# Patient Record
Sex: FEMALE | Race: WHITE | NOT HISPANIC OR LATINO | Employment: OTHER | URBAN - METROPOLITAN AREA
[De-identification: names, ages, dates, MRNs, and addresses within clinical notes are randomized per-mention and may not be internally consistent; named-entity substitution may affect disease eponyms.]

---

## 2018-03-30 ENCOUNTER — CONVERSION ENCOUNTER (OUTPATIENT)
Dept: GENERAL RADIOLOGY | Facility: HOSPITAL | Age: 58
End: 2018-03-30

## 2018-09-18 ENCOUNTER — OFFICE VISIT CONVERTED (OUTPATIENT)
Dept: PULMONOLOGY | Facility: CLINIC | Age: 58
End: 2018-09-18
Attending: INTERNAL MEDICINE

## 2018-09-25 ENCOUNTER — OFFICE VISIT CONVERTED (OUTPATIENT)
Dept: PULMONOLOGY | Facility: CLINIC | Age: 58
End: 2018-09-25
Attending: INTERNAL MEDICINE

## 2018-10-09 ENCOUNTER — OFFICE VISIT CONVERTED (OUTPATIENT)
Dept: PULMONOLOGY | Facility: CLINIC | Age: 58
End: 2018-10-09
Attending: INTERNAL MEDICINE

## 2018-10-19 ENCOUNTER — OFFICE VISIT CONVERTED (OUTPATIENT)
Dept: PULMONOLOGY | Facility: CLINIC | Age: 58
End: 2018-10-19
Attending: INTERNAL MEDICINE

## 2019-02-07 ENCOUNTER — HOSPITAL ENCOUNTER (OUTPATIENT)
Dept: PHYSICAL THERAPY | Facility: CLINIC | Age: 59
Discharge: HOME | End: 2019-02-07
Attending: EMERGENCY MEDICINE

## 2019-04-10 ENCOUNTER — HOSPITAL ENCOUNTER (OUTPATIENT)
Dept: CT IMAGING | Facility: HOSPITAL | Age: 59
Discharge: HOME | End: 2019-04-10
Attending: INTERNAL MEDICINE

## 2019-04-12 ENCOUNTER — HOSPITAL ENCOUNTER (OUTPATIENT)
Dept: INFUSION THERAPY | Facility: HOSPITAL | Age: 59
Discharge: HOME | End: 2019-04-12
Attending: INTERNAL MEDICINE

## 2019-04-12 LAB
ALBUMIN SERPL-MCNC: 4.4 G/DL (ref 3.5–5)
ALBUMIN/GLOB SERPL: 1.7 {RATIO} (ref 1.4–2.6)
AMYLASE FLD-CCNC: 15 U/L (ref 30–150)
APPEARANCE FLD: ABNORMAL
BASOPHILS NFR FLD: 2 %
COLOR AMN: ABNORMAL
CONV TOTAL PROTEIN: 7 G/DL (ref 6.3–8.2)
CRYSTALS FLD MICRO: ABNORMAL
GLOBULIN UR ELPH-MCNC: 2.6 G/DL (ref 2–3.5)
GLUCOSE FLD-MCNC: 85 MG/DL
LDH FLD-CCNC: 198 U/L
LDH SERPL-CCNC: 229 U/L (ref 120–240)
LYMPHOCYTES NFR FLD MANUAL: 82 %
MACROPHAGE FLUID: 0 /100{WBCS}
MONOCYTES NFR FLD: 8 %
NEUTROPHILS NFR FLD MANUAL: 8 %
PH FLD: 8 [PH]
PROT FLD-MCNC: 4.2 G/DL (ref 6.3–8.2)
RBC # FLD AUTO: 5000 /UL
SPECIMEN SOURCE: ABNORMAL
WBC # FLD AUTO: 800 /UL

## 2019-04-15 LAB — BACTERIA FLD CULT: NORMAL

## 2019-04-18 ENCOUNTER — OFFICE VISIT CONVERTED (OUTPATIENT)
Dept: PULMONOLOGY | Facility: CLINIC | Age: 59
End: 2019-04-18
Attending: INTERNAL MEDICINE

## 2019-04-19 ENCOUNTER — HOSPITAL ENCOUNTER (OUTPATIENT)
Dept: LAB | Facility: HOSPITAL | Age: 59
Discharge: HOME | End: 2019-04-19
Attending: INTERNAL MEDICINE

## 2019-04-22 LAB
CCP IGA+IGG SERPL IA-ACNC: 2 UNITS (ref 0–19)
CONV ANTI NUCLEAR ANTIBODY WITH REFLEX: NEGATIVE

## 2019-04-23 LAB
CONV QUANTIFERON TB GOLD: NEGATIVE
QUANTIFERON CRITERIA: NORMAL
QUANTIFERON MITOGEN VALUE: >10 IU/ML
QUANTIFERON NIL VALUE: 0.03 IU/ML
QUANTIFERON TB1 AG VALUE: 0.04 IU/ML
QUANTIFERON TB2 AG VALUE: 0.03 IU/ML

## 2019-04-24 LAB — CONV ANTI NEUTROPHILIC CYTOPLASMIC AB W/REFLEX: NORMAL

## 2019-04-29 ENCOUNTER — HOSPITAL ENCOUNTER (OUTPATIENT)
Dept: SLEEP MEDICINE | Facility: HOSPITAL | Age: 59
Discharge: HOME | End: 2019-04-29
Attending: INTERNAL MEDICINE

## 2019-05-16 ENCOUNTER — OFFICE VISIT CONVERTED (OUTPATIENT)
Dept: PULMONOLOGY | Facility: CLINIC | Age: 59
End: 2019-05-16
Attending: INTERNAL MEDICINE

## 2019-06-05 ENCOUNTER — HOSPITAL ENCOUNTER (OUTPATIENT)
Dept: SLEEP MEDICINE | Facility: HOSPITAL | Age: 59
Discharge: HOME | End: 2019-06-05
Attending: INTERNAL MEDICINE

## 2019-06-12 ENCOUNTER — OUTSIDE FACILITY SERVICE (OUTPATIENT)
Dept: SLEEP MEDICINE | Facility: HOSPITAL | Age: 59
End: 2019-06-12

## 2019-06-12 PROCEDURE — 95810 POLYSOM 6/> YRS 4/> PARAM: CPT | Performed by: INTERNAL MEDICINE

## 2019-06-21 ENCOUNTER — HOSPITAL ENCOUNTER (OUTPATIENT)
Dept: GENERAL RADIOLOGY | Facility: HOSPITAL | Age: 59
Discharge: HOME | End: 2019-06-21
Attending: INTERNAL MEDICINE

## 2019-06-26 ENCOUNTER — HOSPITAL ENCOUNTER (OUTPATIENT)
Dept: INFUSION THERAPY | Facility: HOSPITAL | Age: 59
Discharge: HOME | End: 2019-06-26
Attending: INTERNAL MEDICINE

## 2019-06-26 LAB
ALBUMIN SERPL-MCNC: 4.5 G/DL (ref 3.5–5)
ALBUMIN/GLOB SERPL: 1.6 {RATIO} (ref 1.4–2.6)
AMYLASE FLD-CCNC: 13 U/L (ref 30–150)
APPEARANCE FLD: ABNORMAL
COLOR AMN: ABNORMAL
CONV TOTAL PROTEIN: 7.3 G/DL (ref 6.3–8.2)
CRYSTALS FLD MICRO: ABNORMAL
EOSINOPHIL NFR FLD MANUAL: 5 %
GLOBULIN UR ELPH-MCNC: 2.8 G/DL (ref 2–3.5)
GLUCOSE FLD-MCNC: 107 MG/DL
LDH FLD-CCNC: 236 U/L
LDH SERPL-CCNC: 227 U/L (ref 120–240)
LYMPHOCYTES NFR FLD MANUAL: 81 %
MACROPHAGE FLUID: 11 /100{WBCS}
MONOCYTES NFR FLD: 13 %
NEUTROPHILS NFR FLD MANUAL: 1 %
PH FLD: 8 [PH]
PROT FLD-MCNC: 4.1 G/DL (ref 6.3–8.2)
RBC # FLD AUTO: ABNORMAL /UL
SPECIMEN SOURCE: ABNORMAL
WBC # FLD AUTO: 100 /UL

## 2019-06-29 LAB — BACTERIA FLD CULT: NORMAL

## 2019-07-01 ENCOUNTER — OFFICE VISIT CONVERTED (OUTPATIENT)
Dept: NEUROLOGY | Facility: CLINIC | Age: 59
End: 2019-07-01
Attending: PSYCHIATRY & NEUROLOGY

## 2019-07-02 ENCOUNTER — HOSPITAL ENCOUNTER (OUTPATIENT)
Dept: LAB | Facility: HOSPITAL | Age: 59
Discharge: HOME | End: 2019-07-02

## 2019-07-02 ENCOUNTER — HOSPITAL ENCOUNTER (OUTPATIENT)
Dept: CT IMAGING | Facility: HOSPITAL | Age: 59
Discharge: HOME | End: 2019-07-02

## 2019-07-02 LAB
ALBUMIN SERPL-MCNC: 3.9 G/DL (ref 3.5–5)
ALBUMIN/GLOB SERPL: 1.6 {RATIO} (ref 1.4–2.6)
ALP SERPL-CCNC: 92 U/L (ref 53–141)
ALT SERPL-CCNC: 13 U/L (ref 10–40)
ANION GAP SERPL CALC-SCNC: 15 MMOL/L (ref 8–19)
AST SERPL-CCNC: 13 U/L (ref 15–50)
BASOPHILS # BLD AUTO: 0.03 10*3/UL (ref 0–0.2)
BASOPHILS NFR BLD AUTO: 0.6 % (ref 0–3)
BILIRUB SERPL-MCNC: 0.45 MG/DL (ref 0.2–1.3)
BUN SERPL-MCNC: 8 MG/DL (ref 5–25)
BUN/CREAT SERPL: 14 {RATIO} (ref 6–20)
CALCIUM SERPL-MCNC: 9.3 MG/DL (ref 8.7–10.4)
CHLORIDE SERPL-SCNC: 107 MMOL/L (ref 99–111)
CHOLEST SERPL-MCNC: 202 MG/DL (ref 107–200)
CHOLEST/HDLC SERPL: 4.3 {RATIO} (ref 3–6)
CONV ABS IMM GRAN: 0.03 10*3/UL (ref 0–0.2)
CONV CO2: 26 MMOL/L (ref 22–32)
CONV IMMATURE GRAN: 0.6 % (ref 0–1.8)
CONV TOTAL PROTEIN: 6.4 G/DL (ref 6.3–8.2)
CREAT BLD-MCNC: 0.6 MG/DL (ref 0.6–1.4)
CREAT UR-MCNC: 0.59 MG/DL (ref 0.5–0.9)
DEPRECATED RDW RBC AUTO: 49.8 FL (ref 36.4–46.3)
EOSINOPHIL # BLD AUTO: 0.22 10*3/UL (ref 0–0.7)
EOSINOPHIL # BLD AUTO: 4.7 % (ref 0–7)
ERYTHROCYTE [DISTWIDTH] IN BLOOD BY AUTOMATED COUNT: 13.9 % (ref 11.7–14.4)
GFR SERPLBLD BASED ON 1.73 SQ M-ARVRAT: >60 ML/MIN/{1.73_M2}
GFR SERPLBLD BASED ON 1.73 SQ M-ARVRAT: >60 ML/MIN/{1.73_M2}
GLOBULIN UR ELPH-MCNC: 2.5 G/DL (ref 2–3.5)
GLUCOSE SERPL-MCNC: 88 MG/DL (ref 65–99)
HBA1C MFR BLD: 12.7 G/DL (ref 12–16)
HCT VFR BLD AUTO: 40.9 % (ref 37–47)
HDLC SERPL-MCNC: 47 MG/DL (ref 40–60)
LDLC SERPL CALC-MCNC: 125 MG/DL (ref 70–100)
LYMPHOCYTES # BLD AUTO: 1.46 10*3/UL (ref 1–5)
MCH RBC QN AUTO: 30.2 PG (ref 27–31)
MCHC RBC AUTO-ENTMCNC: 31.1 G/DL (ref 33–37)
MCV RBC AUTO: 97.1 FL (ref 81–99)
MONOCYTES # BLD AUTO: 0.41 10*3/UL (ref 0.2–1.2)
MONOCYTES NFR BLD AUTO: 8.8 % (ref 3–10)
NEUTROPHILS # BLD AUTO: 2.5 10*3/UL (ref 2–8)
NEUTROPHILS NFR BLD AUTO: 53.9 % (ref 30–85)
NRBC CBCN: 0 % (ref 0–0.7)
OSMOLALITY SERPL CALC.SUM OF ELEC: 296 MOSM/KG (ref 273–304)
PLATELET # BLD AUTO: 186 10*3/UL (ref 130–400)
PMV BLD AUTO: 11.8 FL (ref 9.4–12.3)
POTASSIUM SERPL-SCNC: 4.1 MMOL/L (ref 3.5–5.3)
RBC # BLD AUTO: 4.21 10*6/UL (ref 4.2–5.4)
SODIUM SERPL-SCNC: 144 MMOL/L (ref 135–147)
TRIGL SERPL-MCNC: 151 MG/DL (ref 40–150)
VARIANT LYMPHS NFR BLD MANUAL: 31.4 % (ref 20–45)
VLDLC SERPL-MCNC: 30 MG/DL (ref 5–37)
WBC # BLD AUTO: 4.65 10*3/UL (ref 4.8–10.8)

## 2019-07-12 ENCOUNTER — HOSPITAL ENCOUNTER (OUTPATIENT)
Dept: SLEEP MEDICINE | Facility: HOSPITAL | Age: 59
Discharge: HOME | End: 2019-07-12
Attending: INTERNAL MEDICINE

## 2019-07-15 ENCOUNTER — OUTSIDE FACILITY SERVICE (OUTPATIENT)
Dept: SLEEP MEDICINE | Facility: HOSPITAL | Age: 59
End: 2019-07-15

## 2019-07-17 PROCEDURE — 95811 POLYSOM 6/>YRS CPAP 4/> PARM: CPT | Performed by: INTERNAL MEDICINE

## 2019-08-08 ENCOUNTER — OFFICE VISIT CONVERTED (OUTPATIENT)
Dept: PULMONOLOGY | Facility: CLINIC | Age: 59
End: 2019-08-08
Attending: INTERNAL MEDICINE

## 2019-09-06 ENCOUNTER — HOSPITAL ENCOUNTER (OUTPATIENT)
Dept: GENERAL RADIOLOGY | Facility: HOSPITAL | Age: 59
Discharge: HOME | End: 2019-09-06
Attending: INTERNAL MEDICINE

## 2019-09-07 ENCOUNTER — HOSPITAL ENCOUNTER (OUTPATIENT)
Dept: OTHER | Facility: HOSPITAL | Age: 59
Discharge: HOME | End: 2019-09-07
Attending: NURSE PRACTITIONER

## 2019-09-07 LAB
25(OH)D3 SERPL-MCNC: 21.9 NG/ML (ref 30–100)
ALBUMIN SERPL-MCNC: 4.1 G/DL (ref 3.5–5)
ALBUMIN/GLOB SERPL: 1.7 {RATIO} (ref 1.4–2.6)
ALP SERPL-CCNC: 99 U/L (ref 53–141)
ALT SERPL-CCNC: 13 U/L (ref 10–40)
ANION GAP SERPL CALC-SCNC: 16 MMOL/L (ref 8–19)
AST SERPL-CCNC: 16 U/L (ref 15–50)
BILIRUB SERPL-MCNC: 0.49 MG/DL (ref 0.2–1.3)
BUN SERPL-MCNC: 11 MG/DL (ref 5–25)
BUN/CREAT SERPL: 18 {RATIO} (ref 6–20)
CALCIUM SERPL-MCNC: 9.2 MG/DL (ref 8.7–10.4)
CHLORIDE SERPL-SCNC: 107 MMOL/L (ref 99–111)
CHOLEST SERPL-MCNC: 155 MG/DL (ref 107–200)
CHOLEST/HDLC SERPL: 3.2 {RATIO} (ref 3–6)
CONV CO2: 24 MMOL/L (ref 22–32)
CONV TOTAL PROTEIN: 6.5 G/DL (ref 6.3–8.2)
CREAT UR-MCNC: 0.61 MG/DL (ref 0.5–0.9)
GFR SERPLBLD BASED ON 1.73 SQ M-ARVRAT: >60 ML/MIN/{1.73_M2}
GLOBULIN UR ELPH-MCNC: 2.4 G/DL (ref 2–3.5)
GLUCOSE SERPL-MCNC: 96 MG/DL (ref 65–99)
HDLC SERPL-MCNC: 48 MG/DL (ref 40–60)
LDLC SERPL CALC-MCNC: 94 MG/DL (ref 70–100)
OSMOLALITY SERPL CALC.SUM OF ELEC: 295 MOSM/KG (ref 273–304)
POTASSIUM SERPL-SCNC: 4.3 MMOL/L (ref 3.5–5.3)
SODIUM SERPL-SCNC: 143 MMOL/L (ref 135–147)
TRIGL SERPL-MCNC: 64 MG/DL (ref 40–150)
VIT B12 SERPL-MCNC: 158 PG/ML (ref 211–911)
VLDLC SERPL-MCNC: 13 MG/DL (ref 5–37)

## 2019-09-11 ENCOUNTER — OUTSIDE FACILITY SERVICE (OUTPATIENT)
Dept: SLEEP MEDICINE | Facility: HOSPITAL | Age: 59
End: 2019-09-11

## 2019-09-12 ENCOUNTER — OFFICE VISIT CONVERTED (OUTPATIENT)
Dept: PULMONOLOGY | Facility: CLINIC | Age: 59
End: 2019-09-12
Attending: INTERNAL MEDICINE

## 2019-09-17 ENCOUNTER — OFFICE VISIT CONVERTED (OUTPATIENT)
Dept: PULMONOLOGY | Facility: CLINIC | Age: 59
End: 2019-09-17
Attending: THORACIC SURGERY (CARDIOTHORACIC VASCULAR SURGERY)

## 2019-09-17 ENCOUNTER — HOSPITAL ENCOUNTER (OUTPATIENT)
Dept: ONCOLOGY | Facility: HOSPITAL | Age: 59
Discharge: HOME | End: 2019-09-17
Attending: THORACIC SURGERY (CARDIOTHORACIC VASCULAR SURGERY)

## 2019-09-25 ENCOUNTER — HOSPITAL ENCOUNTER (OUTPATIENT)
Dept: SLEEP MEDICINE | Facility: HOSPITAL | Age: 59
Discharge: HOME | End: 2019-09-25
Attending: INTERNAL MEDICINE

## 2019-09-25 ENCOUNTER — OUTSIDE FACILITY SERVICE (OUTPATIENT)
Dept: SLEEP MEDICINE | Facility: HOSPITAL | Age: 59
End: 2019-09-25

## 2019-09-25 PROCEDURE — 99214 OFFICE O/P EST MOD 30 MIN: CPT | Performed by: INTERNAL MEDICINE

## 2019-10-01 ENCOUNTER — HOSPITAL ENCOUNTER (OUTPATIENT)
Dept: CARDIOLOGY | Facility: HOSPITAL | Age: 59
Discharge: HOME | End: 2019-10-01
Attending: INTERNAL MEDICINE

## 2019-10-23 ENCOUNTER — HOSPITAL ENCOUNTER (OUTPATIENT)
Dept: GENERAL RADIOLOGY | Facility: HOSPITAL | Age: 59
Discharge: HOME | End: 2019-10-23
Attending: NURSE PRACTITIONER

## 2020-01-21 ENCOUNTER — OFFICE VISIT CONVERTED (OUTPATIENT)
Dept: PULMONOLOGY | Facility: CLINIC | Age: 60
End: 2020-01-21
Attending: INTERNAL MEDICINE

## 2020-03-05 ENCOUNTER — HOSPITAL ENCOUNTER (OUTPATIENT)
Dept: GENERAL RADIOLOGY | Facility: HOSPITAL | Age: 60
Discharge: HOME | End: 2020-03-05
Attending: NURSE PRACTITIONER

## 2020-03-18 ENCOUNTER — HOSPITAL ENCOUNTER (OUTPATIENT)
Dept: OTHER | Facility: HOSPITAL | Age: 60
Discharge: HOME | End: 2020-03-18
Attending: EMERGENCY MEDICINE

## 2020-07-13 ENCOUNTER — OFFICE VISIT CONVERTED (OUTPATIENT)
Dept: SURGERY | Facility: CLINIC | Age: 60
End: 2020-07-13
Attending: SURGERY

## 2020-08-27 ENCOUNTER — HOSPITAL ENCOUNTER (OUTPATIENT)
Dept: PREADMISSION TESTING | Facility: HOSPITAL | Age: 60
Discharge: HOME | End: 2020-08-27
Attending: SURGERY

## 2020-08-28 LAB — SARS-COV-2 RNA SPEC QL NAA+PROBE: NOT DETECTED

## 2020-08-31 ENCOUNTER — HOSPITAL ENCOUNTER (OUTPATIENT)
Dept: GASTROENTEROLOGY | Facility: HOSPITAL | Age: 60
Setting detail: HOSPITAL OUTPATIENT SURGERY
Discharge: HOME | End: 2020-08-31
Attending: SURGERY

## 2020-09-21 ENCOUNTER — HOSPITAL ENCOUNTER (OUTPATIENT)
Dept: GENERAL RADIOLOGY | Facility: HOSPITAL | Age: 60
Discharge: HOME | End: 2020-09-21
Attending: INTERNAL MEDICINE

## 2020-09-24 ENCOUNTER — OFFICE VISIT CONVERTED (OUTPATIENT)
Dept: SURGERY | Facility: CLINIC | Age: 60
End: 2020-09-24
Attending: SURGERY

## 2020-10-26 ENCOUNTER — HOSPITAL ENCOUNTER (OUTPATIENT)
Dept: GENERAL RADIOLOGY | Facility: HOSPITAL | Age: 60
Discharge: HOME | End: 2020-10-26
Attending: NURSE PRACTITIONER

## 2020-11-04 ENCOUNTER — OUTSIDE FACILITY SERVICE (OUTPATIENT)
Dept: SLEEP MEDICINE | Facility: HOSPITAL | Age: 60
End: 2020-11-04

## 2020-11-04 ENCOUNTER — HOSPITAL ENCOUNTER (OUTPATIENT)
Dept: SLEEP MEDICINE | Facility: HOSPITAL | Age: 60
Discharge: HOME | End: 2020-11-04
Attending: INTERNAL MEDICINE

## 2020-11-04 PROCEDURE — 99213 OFFICE O/P EST LOW 20 MIN: CPT | Performed by: INTERNAL MEDICINE

## 2021-05-10 NOTE — H&P
History and Physical      Patient Name: Chiara Zee   Patient ID: [id number]   Sex: Female   YOB: 1960    Primary Care Provider: Jaleesa CERDA   Referring Provider: Jaleesa CERDA    Visit Date: July 13, 2020    Provider: Bear Nelson MD   Location[facility]   Location Address: [address] [place] [address]  [id number]   Location Phone: [phone]          Chief Complaint  · Outpatient History & Physical / Surgical Orders  · Colon Consult      History Of Present Illness     Ms. Zee is a 60-year-old female who presents with a history of colonic polyps. She denies rectal bleeding or other symptoms.       Past Medical History  Allergic rhinitis, chronic; Chronic Obstructive Pulmonary Disease; COPD (chronic obstructive pulmonary disease); Heart attack; High blood pressure; High cholesterol; Hyperlipidemia; Hypertension, Benign Essential; Limb Pain; Limb Swelling; Muscle cramps; Rectal bleeding; Shortness Of Air         Past Surgical History  Caesarean section         Medication List  Allergy 25 mg oral tablet; atorvastatin 40 mg oral tablet; buspirone 5 mg oral tablet; loratadine 10 mg oral tablet; metoprolol succinate 25 mg oral tablet extended release 24 hr; montelukast 10 mg oral tablet; ranitidine HCl 150 mg oral capsule; sertraline 25 mg oral tablet; Vitamin D3 2,000 unit oral capsule; Xarelto 2.5 mg oral tablet         Allergy List  NO KNOWN DRUG ALLERGIES         Family Medical History  Diabetes, unspecified type; Prostate cancer; Family history of cancer; Family history of diabetes mellitus         Social History  Alcohol (Light); Denies substance abuse (Never); ; Former smoker; Tobacco (Former)         Review of Systems  · Cardiovascular  o Denies  o : chest pain on exertion, shortness of breath, lower extremity swelling  · Respiratory  o Denies  o : wheezing, chronic cough, coughing up blood  · Gastrointestinal  o Denies  o : diarrhea, chronic  "abdominal pain, reflux symptoms      Vitals  Date Time BP Position Site L\R Cuff Size HR RR TEMP (F) WT  HT  BMI kg/m2 BSA m2 O2 Sat        07/13/2020 03:30 PM       16  258lbs 8oz 5'  4\" 44.37 2.3           Physical Examination  · Constitutional  o Appearance  o : reveals patient to be in no acute distress  · Head and Face  o HEENT  o : shows sclera to be nonicteric  · Respiratory  o Respiratory  o : chest is clear  · Cardiovascular  o Heart  o : regular rate and rhythm without murmurs, gallops or rubs  · Gastrointestinal  o Abdominal Examination  o :   § Abdomen  § : abdomen is soft and nontender, bowel sounds are present, no masses, gaurding or rebound were noted   · Musculoskeletal  o Extremeties/Joint  o : extremities show a ful range of motion  · Neurologic  o Neurologic/Reflexes  o : intact          Assessment  · Screening for colon cancer     V76.51/Z12.11  · Pre-op testing     V72.84/Z01.818  · History of colon polyps     V12.72/Z86.010    Problems Reconciled  Plan  · Orders  o Colonoscopy (27995) - V76.51/Z12.11, V12.72/Z86.010 - 08/31/2020  o [facility] Pre-Op Covid-19 Screening (78591) - V72.84/Z01.818 - 08/27/2020   [address]  -- 08/27 @ 4:15P  · Medications  o Medications have been Reconciled  o Transition of Care or Provider Policy  · Instructions  o PLAN:  o Handouts Provided-Pre-Procedure Instructions including date and time and location of procedure.  o Surgical Facility: [facility]  o ****Surgical Orders****  o RISK AND BENEFITS:  o Consent for surgery: Given these options, the patient has verbally expressed an understanding of the risks of surgery and finds these risks acceptable. We will proceed with surgery as soon as possible.  o Consult Anesthesia for any post operative block, or any pain management procedure deemed necessary by the anesthesiologist for adequate post-operative pain control.  o O.R. PREP: Per protocol  o IV: LR@ 75ml/hr  o PLEASE SIGN PERMIT FOR: COLONOSCOPY " WITH POSSIBLE BIOPSIES  o The above History and Physical Examination has been completed within 30 days of admission.  o ****Patient Status****  o Outpatient  o Electronically Identified Patient Education Materials Provided Electronically            Electronically Signed by: Lisha Sidhu-, -Author on July 22, 2020 09:31:32 AM  Electronically Co-signed by: Bear Nelson MD -Reviewer on July 23, 2020 12:47:11 PM

## 2021-05-13 NOTE — PROGRESS NOTES
Progress Note      Patient Name: Chiara Zee   Patient ID: [id number]   Sex: Female   YOB: 1960    Primary Care Provider: Jaleesa CERDA   Referring Provider: Jaleesa CERDA    Visit Date: September 24, 2020    Provider: Bear Nelson MD   Location: [facility]   Location Address: [address] [place] [address]  [id number]   Location Phone: [phone]          Chief Complaint  · Follow Up Surgery      History Of Present Illness     Ms. Zee is seen in follow-up status post colonoscopy. She was found to have diverticulosis.       Past Medical History  Allergic rhinitis, chronic; Chronic Obstructive Pulmonary Disease; COPD (chronic obstructive pulmonary disease); Heart attack; High blood pressure; High cholesterol; Hyperlipidemia; Hypertension, Benign Essential; Limb Pain; Limb Swelling; Muscle cramps; Rectal bleeding; Shortness Of Air         Past Surgical History  Caesarean section; Colonoscopy         Medication List  Allergy 25 mg oral tablet; atorvastatin 40 mg oral tablet; buspirone 5 mg oral tablet; Fiber Gummies 2 gram oral tablet,chewable; loratadine 10 mg oral tablet; metoprolol succinate 25 mg oral tablet extended release 24 hr; montelukast 10 mg oral tablet; ranitidine HCl 150 mg oral capsule; sertraline 25 mg oral tablet; Vitamin D3 2,000 unit oral capsule; Xarelto 2.5 mg oral tablet         Allergy List  NO KNOWN DRUG ALLERGIES         Family Medical History  Diabetes, unspecified type; Prostate cancer; Family history of cancer; Family history of diabetes mellitus         Social History  Alcohol (Light); Denies substance abuse (Never); ; Former smoker; Tobacco (Former)         Review of Systems  · Cardiovascular  o Denies  o : chest pain on exertion, shortness of breath, lower extremity swelling  · Respiratory  o Denies  o : wheezing, chronic cough, coughing up blood  · Gastrointestinal  o Denies  o : diarrhea, chronic abdominal pain,  "reflux symptoms      Vitals  Date Time BP Position Site L\R Cuff Size HR RR TEMP (F) WT  HT  BMI kg/m2 BSA m2 O2 Sat FR L/min FiO2 HC       09/24/2020 03:23 PM       14  250lbs 2oz 5'  4\" 42.93 2.26                 Assessment  · Encounter for examination following surgery     V67.00/Z09      Plan     She was instructed as to a high fiber. I have recommended a follow-up colonoscopy in five years.             Electronically Signed by: Lisha Sidhu-, -Author on October 4, 2020 11:32:28 AM  Electronically Co-signed by: Bear Nelson MD -Reviewer on October 5, 2020 12:58:10 PM  "

## 2021-05-14 ENCOUNTER — OFFICE VISIT CONVERTED (OUTPATIENT)
Dept: PULMONOLOGY | Facility: CLINIC | Age: 61
End: 2021-05-14
Attending: NURSE PRACTITIONER

## 2021-05-14 VITALS — BODY MASS INDEX: 42.7 KG/M2 | RESPIRATION RATE: 14 BRPM | HEIGHT: 64 IN | WEIGHT: 250.12 LBS

## 2021-05-15 VITALS
BODY MASS INDEX: 41.32 KG/M2 | WEIGHT: 242 LBS | HEIGHT: 64 IN | HEART RATE: 64 BPM | SYSTOLIC BLOOD PRESSURE: 137 MMHG | DIASTOLIC BLOOD PRESSURE: 52 MMHG

## 2021-05-15 VITALS — RESPIRATION RATE: 16 BRPM | BODY MASS INDEX: 44.13 KG/M2 | HEIGHT: 64 IN | WEIGHT: 258.5 LBS

## 2021-05-23 ENCOUNTER — TRANSCRIBE ORDERS (OUTPATIENT)
Dept: ADMINISTRATIVE | Facility: HOSPITAL | Age: 61
End: 2021-05-23

## 2021-05-23 DIAGNOSIS — Z12.31 VISIT FOR SCREENING MAMMOGRAM: Primary | ICD-10-CM

## 2021-05-25 ENCOUNTER — TRANSCRIBE ORDERS (OUTPATIENT)
Dept: ADMINISTRATIVE | Facility: HOSPITAL | Age: 61
End: 2021-05-25

## 2021-05-25 DIAGNOSIS — N64.4 BREAST PAIN, RIGHT: Primary | ICD-10-CM

## 2021-05-28 VITALS
SYSTOLIC BLOOD PRESSURE: 113 MMHG | HEIGHT: 64 IN | BODY MASS INDEX: 40.47 KG/M2 | WEIGHT: 237.06 LBS | DIASTOLIC BLOOD PRESSURE: 56 MMHG | BODY MASS INDEX: 43.8 KG/M2 | BODY MASS INDEX: 41.23 KG/M2 | BODY MASS INDEX: 39.35 KG/M2 | DIASTOLIC BLOOD PRESSURE: 55 MMHG | SYSTOLIC BLOOD PRESSURE: 113 MMHG | DIASTOLIC BLOOD PRESSURE: 55 MMHG | HEART RATE: 82 BPM | SYSTOLIC BLOOD PRESSURE: 104 MMHG | WEIGHT: 236.19 LBS | TEMPERATURE: 98.3 F | OXYGEN SATURATION: 99 % | OXYGEN SATURATION: 96 % | HEIGHT: 64 IN | RESPIRATION RATE: 16 BRPM | HEART RATE: 66 BPM | WEIGHT: 246.19 LBS | HEIGHT: 64 IN | RESPIRATION RATE: 12 BRPM | HEIGHT: 63 IN | WEIGHT: 247.19 LBS | BODY MASS INDEX: 42.76 KG/M2 | OXYGEN SATURATION: 97 % | OXYGEN SATURATION: 96 % | DIASTOLIC BLOOD PRESSURE: 44 MMHG | HEIGHT: 65 IN | TEMPERATURE: 97.6 F | HEART RATE: 91 BPM | RESPIRATION RATE: 12 BRPM | HEART RATE: 75 BPM | TEMPERATURE: 99.1 F | HEART RATE: 67 BPM | WEIGHT: 250.5 LBS | RESPIRATION RATE: 16 BRPM | BODY MASS INDEX: 42.03 KG/M2 | HEIGHT: 65 IN | TEMPERATURE: 98 F | RESPIRATION RATE: 12 BRPM | SYSTOLIC BLOOD PRESSURE: 107 MMHG | HEART RATE: 78 BPM | RESPIRATION RATE: 12 BRPM | OXYGEN SATURATION: 98 % | TEMPERATURE: 98 F | TEMPERATURE: 98.2 F | SYSTOLIC BLOOD PRESSURE: 126 MMHG | WEIGHT: 247.5 LBS | SYSTOLIC BLOOD PRESSURE: 122 MMHG | DIASTOLIC BLOOD PRESSURE: 43 MMHG | DIASTOLIC BLOOD PRESSURE: 40 MMHG | OXYGEN SATURATION: 99 %

## 2021-05-28 VITALS
SYSTOLIC BLOOD PRESSURE: 157 MMHG | WEIGHT: 251.12 LBS | BODY MASS INDEX: 41.91 KG/M2 | HEIGHT: 65 IN | RESPIRATION RATE: 16 BRPM | DIASTOLIC BLOOD PRESSURE: 66 MMHG | WEIGHT: 251.56 LBS | RESPIRATION RATE: 16 BRPM | TEMPERATURE: 98.3 F | OXYGEN SATURATION: 96 % | HEIGHT: 63 IN | BODY MASS INDEX: 44.5 KG/M2 | DIASTOLIC BLOOD PRESSURE: 63 MMHG | OXYGEN SATURATION: 96 % | SYSTOLIC BLOOD PRESSURE: 132 MMHG | HEART RATE: 79 BPM | HEART RATE: 84 BPM | TEMPERATURE: 97.7 F

## 2021-05-28 VITALS
DIASTOLIC BLOOD PRESSURE: 79 MMHG | OXYGEN SATURATION: 97 % | HEIGHT: 64 IN | WEIGHT: 236.55 LBS | TEMPERATURE: 97.4 F | BODY MASS INDEX: 40.39 KG/M2 | HEART RATE: 74 BPM | RESPIRATION RATE: 16 BRPM | SYSTOLIC BLOOD PRESSURE: 128 MMHG

## 2021-05-28 VITALS
RESPIRATION RATE: 17 BRPM | HEART RATE: 76 BPM | OXYGEN SATURATION: 96 % | SYSTOLIC BLOOD PRESSURE: 156 MMHG | TEMPERATURE: 98.3 F | DIASTOLIC BLOOD PRESSURE: 82 MMHG | WEIGHT: 293 LBS | BODY MASS INDEX: 50.02 KG/M2 | HEIGHT: 64 IN

## 2021-05-28 VITALS
DIASTOLIC BLOOD PRESSURE: 60 MMHG | TEMPERATURE: 98 F | HEART RATE: 71 BPM | RESPIRATION RATE: 18 BRPM | WEIGHT: 251.19 LBS | OXYGEN SATURATION: 97 % | SYSTOLIC BLOOD PRESSURE: 136 MMHG | HEIGHT: 64 IN | BODY MASS INDEX: 42.88 KG/M2
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
## 2021-05-28 NOTE — PROGRESS NOTES
Patient: OLIVA MCELROY     Acct: [id number]     Report: #[id number]  UNIT #: [id number]     : 1960    Encounter Date:2021  PRIMARY CARE: PRAVEENA CHEW  ***Signed***  --------------------------------------------------------------------------------------------------------------------  Chief Complaint      Encounter Date      May 14, 2021            Primary Care Provider      PRAVEENA CHEW            Patient Complaint      Patient is complaining of      Pt here for abnormal chest xray. COPD.            VITALS      Height 5 ft 4 in / 162.56 cm      Weight 298 lbs  / 135.777134 kg      BSA 2.32 m2      BMI 51.2 kg/m2      Temperature 98.3 F / 36.83 C - Temporal      Pulse 76      Respirations 17      Blood Pressure 156/82 Sitting, Left Arm      Pulse Oximetry 96%, room air            HPI      The patient is a 60 year old female patient of Dr. Dawson's here for surgical     clearance today.             The patient states she was short of breath last month and went to see her     primary care provider who ordered a chest x-ray. Chest x-ray showed some pleural    effusion that is chronic as well as mild diffuse interstitial markings and     bibasilar atelectasis. The patient states since her visit with her primary care     provider her shortness of breath is at baseline however she states she has not     been taking her Stiolto for some time and has only been using albuterol inhaler     twice a week. She is able to walk on flat ground for a while but is not able to     climb a flight of stairs without getting short of breath. She denies any fever     or chills, chest pain, hemoptysis,  purulent sputum production, nausea or     vomiting or diarrhea or swollen glands in head and neck. She is a former     cigarettes smoker and she is up to date on her vaccines. The patient states she     broke her wrist on 21 from a fall and needs clearance to get surgery.             I reviewed the Review of  Systems, medical, surgical and family history and agree    with those as entered.            ROS      Constitutional:  Complains of: Fatigue; Denies: Fever, Weight gain, Weight loss,    Chills, Insomnia, Other      Respiratory/Breathing:  Complains of: Shortness of air; Denies: Wheezing, Cough,    Hemoptysis, Pleuritic pain, Other      Endocrine:  Denies: Polydipsia, Polyuria, Heat/cold intolerance, Abnorml     menstrual pattern, Diabetes, Other      Eyes:  Denies: Blurred vision, Vision Changes, Other      Ears, nose, mouth, throat:  Denies: Congestion, Dysphagia, Hearing Changes, Nose    Bleeding, Nasal Discharge, Throat pain, Tinnitus, Other      Cardiovascular:  Denies: Chest Pain, Exertional dyspnea, Peripheral Edema,     Palpitations, Syncope, Wake up Gasping for air, Orthopnea, Tachycardia, Other      Gastrointestinal:  Denies: Abdominal pain/cramping, Bloody stools, Constipation,    Diarrhea, Melena, Nausea, Vomiting, Other      Genitourinary:  Denies: Dysuria, Urinary frequency, Incontinence, Hematuria,     Urgency, Other      Musculoskeletal:  Denies: Joint Pain, Joint Stiffness, Joint Swelling, Myalgias,    Other      Hematologic/lymphatic:  DENIES: Lymphadenopathy, Bruising, Bleeding tendencies,     Other      Neurologic:  Denies: Headache, Numbness, Weakness, Seizures, Other      Psychiatric:  Denies: Anxiety, Appropriate Effect, Depression, Other      Sleep:  No: Excessive daytime sleep, Morning Headache?, Snoring, Insomnia?, Stop    breathing at sleep?, Other      Integumentary:  Denies: Rash, Dry skin, Skin Warm to Touch, Other            FAMILY/SOCIAL/MEDICAL HX      Surgical History:  Yes: Bowel Surgery (COLONOSCOPY), Breast Surgery, Oral     Surgery (WISDOM TEETH, DENTAL); No: AAA Repair, Abdominal Surgery, Adenoids,     Angioplasty, Appendectomy, Back Surgery, Bladder Surgery, CABG, Carotid     Stenosis, Cholecystectomy, Ear Surgery, Eye Surgery, Head Surgery, Hernia     Surgery, Kidney Surgery,  Nose Surgery, Orthopedic Surgery, Prostatectomy, Rectal    Surgery, Spinal Surgery, Testicular Surgery, Throat Surgery, Tonsils, Valve     Replacement, Vascular Surgery, Other Surgeries      Diabetes - Family Hx:  Brother, Sister      Cancer/Type - Family Hx:  Mother, Father      Social History:  Tobacco Use      Smoking status:  Former smoker ((1 ppd, 35 years, quit ))       Section:  Yes      Tubal Ligation:  Yes      Hysterectomy:  No      Anticoagulation Therapy:  Yes      Medical History:  Yes: Arthritis (KNEES), Chronic Bronchitis/COPD (MILD),     Depression, Anxiety, Heart Attack (NOT FOR SURE WHEN-YEARS AGO),     Hemorrhoids/Rectal Prob (COLON POLYPS), High Blood Pressure (ON MEDS), Shortness    Of Breath (INHALER), Tuberculosis or Pos TB Te (TB exposure); No: Anemia,     Asthma, Blood Disease, Broken Bones, Cataracts, Chemical Dependency,     Chemotherapy/Cancer, Emphysema, Chronic Liver Disease, Colon Trouble, Colitis,     Diverticulitis, Congestive Heart Failu, Deafness or Ringing Ears, Bipolar     Disorder, PTSD, Diabetes, Epilepsy, Seizures, Glaucoma, Gall Stones, Gout, Head     Injury, Heart Murmur, GERD, Hepatitis, Hiatal Hernia, HIV (Do not ask - volu,     Kidney or Bladder Disease, Kidney Stones, Migrane Headaches, Mitral Valve     Prolapse, Psychiatric Care, Reflux Disease, Rheumatic Fever, Sexually     Transmitted Dis, Sinus Trouble, Skin Disease/Psoriais/Ecz, Stroke, Thyroid     Problem, Miscellaneous Medical/oth      Psychiatric History      anxiety and depression            PREVENTION      Hx Influenza Vaccination:  Yes      Date Influenza Vaccine Given:  Sep 1, 2020      Influenza Vaccine Declined:  No      2 or More Falls in Past Year?:  Yes      Fall Past Year with Injury?:  Yes      Hx Pneumococcal Vaccination:  Yes      Encouraged to follow-up with:  PCP regarding preventative exams.      Chart initiated by      Bambi Reed CMA            ALLERGIES/MEDICATIONS       Allergies:        Coded Allergies:             NO KNOWN ALLERGIES (Unverified , 5/14/21)      Medications    Last Reconciled on 5/14/21 14:00 by TORSTEN CHOW      Budesonide/Glycopyr/Formoterol (Breztri Aerosphere Inhaler) 10.7 Gm Hfa.aer.ad      2 PUFFS INH BID, #1 INH 11 Refills         Prov: TORSTEN CHOW APRN         5/14/21       MDI-Albuterol (Ventolin HFA) 8 Gm Hfa.aer.ad      2 PUFFS INH Q6H PRN for SHORTNESS OF BREATH, #1 MDI 11 Refills         Prov: TORSTEN CHOW APRN         5/14/21       Meloxicam (Meloxicam*) 15 Mg Tablet      15 MG PO QDAY, #30 TAB 0 Refills         Reported         8/28/20       Cetirizine Hcl (zyrTEC) 10 Mg Tablet      20 MG PO BID, #30 TAB 0 Refills         Reported         8/28/20       Rivaroxaban (Xarelto) 2.5 Mg Tablet      2.5 MG PO BID for 30 Days, #60 TAB         Reported         9/12/19       Montelukast Sodium (Singulair*) 10 Mg Tab      10 MG PO HS, #30 TAB 0 Refills         Reported         9/18/18       busPIRone HCl (busPIRone HCl) 5 Mg Tablet      5 MG PO QDAY, #60 TAB         Reported         9/18/18       Cholecalciferol (Vitamin D3) (Vitamin D3) 2,000 Unit Tablet      2000 UNITS PO BID, #30 TAB         Reported         9/18/18       Metoprolol Succinate (Metoprolol Succinate) 25 Mg Tab.er.24h      25 MG PO QDAY, #30 TAB 0 Refills         Reported         9/6/17       Atorvastatin Calcium (Lipitor*) 20 Mg Tablet      20 MG PO HS, #30 TAB 0 Refills         Reported         9/6/17      Current Medications      Current Medications Reviewed 5/14/21            EXAM      Vital Signs Reviewed      Gen: WDWN, Alert, NAD.        HEENT:  PERRL, EOMI.  OP, nares clear, no sinus tenderness.      Neck:  Supple, no JVD, no thyromegaly.      Lymph: No axillary, cervical, supraclavicular lymphadenopathy noted bilaterally.      Chest: Bilateral diminished bases, no wheezes, rales or rhonchi appreciated, no    rmal work of breathing noted. The patient is able to speak  full sentences.       CV:  RRR, no MGR, pulses 2+, equal.      Abd:  Soft, NT, ND, + BS, no HSM.      EXT:  No clubbing, no cyanosis, no edema, no joint tenderness.       Neuro:  A  Skin: No rashes or lesions.      Vitals      Vitals:             Height 5 ft 4 in / 162.56 cm           Weight 298 lbs  / 135.094180 kg           BSA 2.32 m2           BMI 51.2 kg/m2           Temperature 98.3 F / 36.83 C - Temporal           Pulse 76           Respirations 17           Blood Pressure 156/82 Sitting, Left Arm           Pulse Oximetry 96%, room air            REVIEW      Results Reviewed      PCCS Results Reviewed?:  Yes Prev Radiology Results, Yes Previous [facility] Records      Radiographic Results      I personally reviewed a chest x-ray from [facility] on 21 and a CT scan of     the chest from 2020.      PFT Results      Patient: CHIRAA MCELROY   Acct: #[id number]   Report: #[id number]            UNIT #: [id number]    DOS:       LOCATION:[facility]     : 1960            PROVIDERS      ADMITTING:     FAMILY:  PRAVEENA CHEW         OTHER:       DICTATING:  ARNULFO MCCABE DO            REASON FOR VISIT:  COUGH            *******Signed******                                    [facility]                          [facility]                            [place], [place]  [address]               __________________________________________________________________________             Patient Name:                   Attending Physician:      Chiara Mcelroy D.O.             Patient Visit # MR #            Admit Date  Disch Date     Location      [id number]    [id number]      10/01/2019                 [facility]- -             Date of Birth      1960      __________________________________________________________________________      0821 - DIAGNOSTIC REPORT             PULMONARY FUNCTION TEST             DATE OF SERVICE:       10/01/2019.             SPIROMETRY:      FEV1/FVC ratio is 78      FEV1 is 87% of predicted, 2.25 L.      FVC is 85% of predicted, 2.87 L.             LUNG VOLUMES:      Total lung capacity is 90% of predicted, 4.58 L.      Residual volume is 93% of predicted, 1.87 L.             DIFFUSION CAPACITY:      DLCO is 62% of predicted.             COMPARISON:      Compared to previous pulmonary function test performed in 2018:      FEV1 has declined 13%.      Total lung capacity has declined 19%.             CONCLUSION:      1. Normal spirometry without obstructive defect.      2. Normal lung volumes.      3. Mildly reduced diffusion capacity.            Correlate clinically.             To be electronically signed in [employer]      [id number] ARNULFO MCCABE D.O.             KM:      D:  10/14/2019 07:49      T:  10/14/2019 10:06      #[id number]             Until signed, this is an unconfirmed preliminary report that may contain      errors and is subject to change.                   Until signed, this is an unconfirmed preliminary report that may contain      errors and is subject to change.                     <Electronically signed by ARNULFO MCCABE DO>                10/14/19 1409               ARNULFO MCCABE      D:10/14/19 0749            Assessment      Lung nodule - R91.1            Chronic pleural effusion - J90            COPD (chronic obstructive pulmonary disease) - J44.9            SAMI on CPAP - G47.33, Z99.89            Morbid obesity with BMI of 50.0-59.9, adult - E66.01, Z68.43            Wrist fracture, right - S62.101A            Notes      New Medications      * Budesonide/Glycopyr/Formoterol (Breztri Aerosphere Inhaler) 10.7 GM       HFA.AER.AD: 2 PUFFS INH BID #1      * Budesonide/Glycopyr/Formoterol (Breztri Aerosphere Inhaler) 10.7 GM HFA.AER.AD               Sample - Qty 4      Renewed Medications      * MDI-Albuterol  (Ventolin HFA) 8 GM HFA.AER.AD: 2 PUFFS INH Q6H PRN SHORTNESS OF      BREATH #1      IMPRESSION:      1. Chronic right pleural effusion.       2. Lung nodule.       3. Chronic obstructive pulmonary disease without acute exacerbation.       4. Obesity with BMI 51.2.      5. Obstructive sleep apnea compliant with CPAP.       6. Tobacco abuse of cigarettes in remission.       7. Wrist fracture needing surgical clearance.       8. Chronic dyspnea.             PLAN:      1. We will start the patient on breztri at 2 puffs twice daily due to her     chronic dyspnea.  We will give the patient samples of breztri as well as send a     prescription to the pharmacy.       2. Continue albuterol inhaler as needed.       3. The patient is cleared for wrist surgery with moderate risk.       4.  I spent 5 minutes discussing diet and exercise counseling. I recommend 30     minutes of daily exercise as well as 1800 calorie low fat diet.      5. Follow up in 3-4 months. The patient states she has seen Dr. Garcia in the     hospital before so she would like to see him again.            Patient Education      ACO BMI High above 25:  Counseling Given, Encouraged weight loss, Encourage     dietary changes      Patient Education Provided:  COPD, How to use an Inhaler            Patient Education:        Chronic Obstructive Pulmonary Disease            Electronically signed by TORSTEN CHOW  05/19/2021 09:39       Disclaimer: Converted document may not contain table formatting or lab diagrams. Please see [employer] for the authenticated document.
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
## 2021-06-01 ENCOUNTER — HOSPITAL ENCOUNTER (OUTPATIENT)
Dept: GENERAL RADIOLOGY | Facility: HOSPITAL | Age: 61
Discharge: HOME | End: 2021-06-01
Attending: NURSE PRACTITIONER

## 2021-06-18 ENCOUNTER — TRANSCRIBE ORDERS (OUTPATIENT)
Dept: PHYSICAL THERAPY | Facility: CLINIC | Age: 61
End: 2021-06-18

## 2021-06-18 DIAGNOSIS — S52.611A CLOSED DISPLACED FRACTURE OF STYLOID PROCESS OF RIGHT ULNA, INITIAL ENCOUNTER: ICD-10-CM

## 2021-06-18 DIAGNOSIS — S52.551A OTH EXTRARTIC FRACTURE OF LOWER END OF RIGHT RADIUS, INIT: Primary | ICD-10-CM

## 2021-06-24 ENCOUNTER — TREATMENT (OUTPATIENT)
Dept: PHYSICAL THERAPY | Facility: CLINIC | Age: 61
End: 2021-06-24

## 2021-06-24 DIAGNOSIS — S52.551D CLOSED EXTRA-ARTICULAR FRACTURE OF DISTAL END OF RIGHT RADIUS WITH ROUTINE HEALING, SUBSEQUENT ENCOUNTER: Primary | ICD-10-CM

## 2021-06-24 DIAGNOSIS — S52.611A CLOSED DISPLACED FRACTURE OF STYLOID PROCESS OF RIGHT ULNA, INITIAL ENCOUNTER: ICD-10-CM

## 2021-06-24 DIAGNOSIS — S52.551A OTH EXTRARTIC FRACTURE OF LOWER END OF RIGHT RADIUS, INIT: ICD-10-CM

## 2021-06-24 DIAGNOSIS — S52.611D CLOSED DISPLACED FRACTURE OF STYLOID PROCESS OF RIGHT ULNA WITH ROUTINE HEALING, SUBSEQUENT ENCOUNTER: ICD-10-CM

## 2021-06-24 PROCEDURE — 97022 WHIRLPOOL THERAPY: CPT | Performed by: PHYSICAL THERAPIST

## 2021-06-24 PROCEDURE — 97165 OT EVAL LOW COMPLEX 30 MIN: CPT | Performed by: PHYSICAL THERAPIST

## 2021-06-24 PROCEDURE — 97110 THERAPEUTIC EXERCISES: CPT | Performed by: PHYSICAL THERAPIST

## 2021-06-24 NOTE — PROGRESS NOTES
Outpatient Occupational Therapy Ortho Initial Evaluation    Patient: Chiara Zee   : 1960  Diagnosis/ICD-10 Code:  Closed extra-articular fracture of distal end of right radius with routine healing, subsequent encounter [S52.551D]  Referring practitioner: Scott Catalan MD  Date of Initial Visit: 2021  Today's Date: 2021  Patient seen for 1 sessions               Subjective Questionnaire: QuickDASH:       Subjective Evaluation    History of Present Illness  Date of onset: 2021  Date of surgery: 2021  Mechanism of injury: Pt reports missing a step and falling onto her R wrist fracturing it.      Patient Occupation: [employer]   Precautions and Work Restrictions: off workQuality of life: good    Pain  Current pain ratin  At best pain ratin  At worst pain rating: 10  Quality: dull ache  Relieving factors: medications, ice and heat    Social Support  Lives with: sister.    Hand dominance: right    Diagnostic Tests  X-ray: abnormal    Patient Goals  Patient goals for therapy: decreased edema, decreased pain, increased motion, increased strength and return to work             Objective          Observations     Right Wrist/Hand   Positive for edema and incision.       Tenderness     Additional Tenderness Details  Incision tender and over pisiform    Neurological Testing     Sensation     Wrist/Hand     Right   Hypersensation: light touch    Active Range of Motion     Right Elbow   Forearm supination: 15 degrees   Forearm pronation: 85 degrees     Right Wrist   Wrist flexion: 20 degrees   Wrist extension: 5 degrees   Radial deviation: 10 degrees   Ulnar deviation: 5 degrees     Right Thumb   Flexion     MP: 40 degrees    DIP: 10 degrees    Right Digits   Flexion   Index     MCP: 75 degrees    PIP: 50 degrees    DIP: 10 degrees  Middle     MCP: 70 degrees    PIP: 60 degrees    DIP: 10 degrees  Ring     MCP: 60 degrees    PIP: 65 degrees    DIP: 10  degrees  Little     MCP: 40 degrees    PIP: 65 degrees    DIP: 15 degrees    Additional Active Range of Motion Details  TREVIZO   Index 130  Middle 140  Ring 135  Small 120      Strength/Myotome Testing     Left Wrist/Hand      (2nd hand position)     Trial 1: 60 lbs    Trial 2: 55 lbs    Trial 3: 58 lbs    Average: 57.67 lbs     General Comments     Wrist/Hand Comments  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 The patient complains of pain in the R wrist.                  LTG 1: 12 weeks:  The patient will report a pain rating of 2/10 or better  With movement in order to improve sleep quality and tolerance to performance of activities of daily living.                                  STATUS:  New                  STG 1a: 6weeks:  The patient will report a pain rating of 5/10 or better with movement.                                   STATUS:  New  2. The patient has limited ROM of the R wrist and hand.                  LTG 2: 12 weeks:  The patient will demonstrate 50 degrees of wrist flex/ext and 70 degrees supination as well as 240 degrees TREVIZO digits to allow the patient to grasp utensils for cooking and eating.                                  STATUS:  New                   STG 2a: 6 weeks:  The patient will demonstrate 30 degrees of wrist flex/ext and supination as well as 200 degrees TREVIZO digits.                                  STATUS:  New                             3. The patient has limited strength of the R hand.                  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                                  STATUS:  New                  STG 3a: 6 weeks:  The patient will demonstrate good tolerance to  strength testing.                                  STATUS:  New  4. Carrying, Moving, and Handling Objects Functional Limitation                                   LTG 4: 12 weeks:  The patient will demonstrate 0% limitation by achieving a score of 11/55 on the Quick DASH.                                  STATUS:  New                  STG 4a: 6 weeks:  The patient will demonstrate 20-39% limitation by achieving a score of 20/55 on the Quick DASH.                                    STATUS:  New                    TREATMENT: Orthotic fabrication/fitting/management and training, Manual therapy, therapeutic exercise, home exercise instruction, and modalities as needed to include: electrical stimulation, ultrasound, moist heat, paraffin, fluidotherapy and ice.      Plan  Planned modality interventions: TENS and thermotherapy (hydrocollator packs)  Other planned modality interventions: Fluidotherapy  Planned therapy interventions: neuromuscular re-education, manual therapy, soft tissue mobilization, strengthening, stretching, home exercise program, functional ROM exercises and fine motor coordination training  Frequency: 2x week  Duration in weeks: 12  Treatment plan discussed with: patient          ICD-10-CM ICD-9-CM   1. Closed extra-articular fracture of distal end of right radius with routine healing, subsequent encounter  S52.551D V54.12   2. Closed displaced fracture of styloid process of right ulna with routine healing, subsequent encounter  S52.611D V54.12       Patient is indicated for skilled occupational therapy services.    History # of Personal Factors and/or Comorbidities: MODERATE (1-2)  Examination of Body System(s): # of elements: LOW (1-2)  Clinical Presentation: STABLE   Clinical Decision Making: LOW      Evaluation:  Low  Complexity:    30     mins  70276;    Timed:  Manual Therapy:    5     mins  44231;  Therapeutic Exercise:    10     mins  71982;       Untimed:  Electrical Stimulation:    0     mins  31348 ( );  Fluidotherapy:  __10_ mins  05481    Timed Treatment:   15   mins   Total Treatment:     55   mins      OT SIGNATURE: MAGDALENA Dunn/BERT   DATE TREATMENT INITIATED: 6/24/2021    Initial Certification  Certification Period: 9/22/2021  I certify that the therapy services are furnished while this patient is under my care.  The services outlined above are required by this patient, and will be reviewed every 90 days.     PHYSICIAN: Scott Torres MD      DATE:     Please sign and return via fax to  [phone]   Thank you, [facility]ational [facility].

## 2021-06-29 ENCOUNTER — TREATMENT (OUTPATIENT)
Dept: PHYSICAL THERAPY | Facility: CLINIC | Age: 61
End: 2021-06-29

## 2021-06-29 DIAGNOSIS — S52.551D CLOSED EXTRA-ARTICULAR FRACTURE OF DISTAL END OF RIGHT RADIUS WITH ROUTINE HEALING, SUBSEQUENT ENCOUNTER: Primary | ICD-10-CM

## 2021-06-29 DIAGNOSIS — S52.611D CLOSED DISPLACED FRACTURE OF STYLOID PROCESS OF RIGHT ULNA WITH ROUTINE HEALING, SUBSEQUENT ENCOUNTER: ICD-10-CM

## 2021-06-29 PROCEDURE — 97022 WHIRLPOOL THERAPY: CPT | Performed by: PHYSICAL THERAPIST

## 2021-06-29 PROCEDURE — 97110 THERAPEUTIC EXERCISES: CPT | Performed by: PHYSICAL THERAPIST

## 2021-06-29 NOTE — PROGRESS NOTES
Occupational Therapy Daily Treatment Note      Patient: Chiara Zee   : 1960  Referring practitioner: No ref. provider found  Date of Initial Visit: Type: THERAPY  Noted: 2021  Today's Date: 2021  Patient seen for 2 sessions         Chiara Zee reports: It is hurting today.        Subjective Evaluation    Pain  Current pain ratin           Objective   See Exercise, Manual, and Modality Logs for complete treatment.       Assessment/Plan    Visit Diagnoses:    ICD-10-CM ICD-9-CM   1. Closed extra-articular fracture of distal end of right radius with routine healing, subsequent encounter  S52.551D V54.12   2. Closed displaced fracture of styloid process of right ulna with routine healing, subsequent encounter  S52.611D V54.12       Continue per POC         Timed:  Manual Therapy:    6     mins  10839;  Therapeutic Exercise:    15     mins  88436;       Untimed:  Electrical Stimulation:    0     mins  94870 ( );  Fluidotherapy        10    mins  90534    Timed Treatment:   21   mins   Total Treatment:     31   min    Ana Giraldo OTR/L  Occupational Therapist

## 2021-07-01 ENCOUNTER — TREATMENT (OUTPATIENT)
Dept: PHYSICAL THERAPY | Facility: CLINIC | Age: 61
End: 2021-07-01

## 2021-07-01 DIAGNOSIS — S52.611D CLOSED DISPLACED FRACTURE OF STYLOID PROCESS OF RIGHT ULNA WITH ROUTINE HEALING, SUBSEQUENT ENCOUNTER: ICD-10-CM

## 2021-07-01 DIAGNOSIS — S52.551D CLOSED EXTRA-ARTICULAR FRACTURE OF DISTAL END OF RIGHT RADIUS WITH ROUTINE HEALING, SUBSEQUENT ENCOUNTER: Primary | ICD-10-CM

## 2021-07-01 PROCEDURE — 97022 WHIRLPOOL THERAPY: CPT | Performed by: PHYSICAL THERAPIST

## 2021-07-01 PROCEDURE — 97140 MANUAL THERAPY 1/> REGIONS: CPT | Performed by: PHYSICAL THERAPIST

## 2021-07-01 PROCEDURE — 97110 THERAPEUTIC EXERCISES: CPT | Performed by: PHYSICAL THERAPIST

## 2021-07-01 NOTE — PROGRESS NOTES
Occupational Therapy Daily Treatment Note      Patient: Chiara Zee   : 1960  Referring practitioner: Scott Catalan MD  Date of Initial Visit: Type: THERAPY  Noted: 2021  Today's Date: 2021  Patient seen for 3 sessions         Chiara Zee reports: I don't have any pain today.        Subjective     Objective   See Exercise, Manual, and Modality Logs for complete treatment.       Assessment/Plan    Visit Diagnoses:    ICD-10-CM ICD-9-CM   1. Closed extra-articular fracture of distal end of right radius with routine healing, subsequent encounter  S52.551D V54.12   2. Closed displaced fracture of styloid process of right ulna with routine healing, subsequent encounter  S52.611D V54.12       Continue per POC         Timed:  Manual Therapy:    10     mins  35467;  Therapeutic Exercise:    10     mins  56469;       Untimed:  Electrical Stimulation:    0     mins  80712 ( );  Fluidotherapy        10    mins  77947    Timed Treatment:   20   mins   Total Treatment:     30   min    Ana Giraldo OTR/L  Occupational Therapist

## 2021-07-07 ENCOUNTER — TREATMENT (OUTPATIENT)
Dept: PHYSICAL THERAPY | Facility: CLINIC | Age: 61
End: 2021-07-07

## 2021-07-07 DIAGNOSIS — S52.611D CLOSED DISPLACED FRACTURE OF STYLOID PROCESS OF RIGHT ULNA WITH ROUTINE HEALING, SUBSEQUENT ENCOUNTER: ICD-10-CM

## 2021-07-07 DIAGNOSIS — S52.551D CLOSED EXTRA-ARTICULAR FRACTURE OF DISTAL END OF RIGHT RADIUS WITH ROUTINE HEALING, SUBSEQUENT ENCOUNTER: Primary | ICD-10-CM

## 2021-07-07 PROCEDURE — 97110 THERAPEUTIC EXERCISES: CPT | Performed by: PHYSICAL THERAPIST

## 2021-07-07 PROCEDURE — 97140 MANUAL THERAPY 1/> REGIONS: CPT | Performed by: PHYSICAL THERAPIST

## 2021-07-07 PROCEDURE — 97022 WHIRLPOOL THERAPY: CPT | Performed by: PHYSICAL THERAPIST

## 2021-07-07 NOTE — PROGRESS NOTES
Occupational Therapy Daily Treatment Note      Patient: Chiara Zee   : 1960  Referring practitioner: Scott Catalan MD  Date of Initial Visit: Type: THERAPY  Noted: 2021  Today's Date: 2021  Patient seen for 4 sessions         Chiara Zee reports: it is sore today I fell again and reached to the wall to grab on.        Subjective Evaluation    Pain  Current pain ratin           Objective   See Exercise, Manual, and Modality Logs for complete treatment.       Assessment/Plan    Visit Diagnoses:    ICD-10-CM ICD-9-CM   1. Closed extra-articular fracture of distal end of right radius with routine healing, subsequent encounter  S52.551D V54.12   2. Closed displaced fracture of styloid process of right ulna with routine healing, subsequent encounter  S52.611D V54.12       Continue per POC         Timed:  Manual Therapy:    13     mins  26529;  Therapeutic Exercise:    10     mins  00103;       Untimed:  Electrical Stimulation:    0     mins  36955 ( );  Fluidotherapy        10    mins  96698    Timed Treatment:   23   mins   Total Treatment:     33   min    Ana Giraldo OTR/L  Occupational Therapist

## 2021-07-13 ENCOUNTER — TREATMENT (OUTPATIENT)
Dept: PHYSICAL THERAPY | Facility: CLINIC | Age: 61
End: 2021-07-13

## 2021-07-13 DIAGNOSIS — S52.611D CLOSED DISPLACED FRACTURE OF STYLOID PROCESS OF RIGHT ULNA WITH ROUTINE HEALING, SUBSEQUENT ENCOUNTER: ICD-10-CM

## 2021-07-13 DIAGNOSIS — S52.551D CLOSED EXTRA-ARTICULAR FRACTURE OF DISTAL END OF RIGHT RADIUS WITH ROUTINE HEALING, SUBSEQUENT ENCOUNTER: Primary | ICD-10-CM

## 2021-07-13 PROCEDURE — 97022 WHIRLPOOL THERAPY: CPT | Performed by: PHYSICAL THERAPIST

## 2021-07-13 PROCEDURE — 97140 MANUAL THERAPY 1/> REGIONS: CPT | Performed by: PHYSICAL THERAPIST

## 2021-07-13 PROCEDURE — 97110 THERAPEUTIC EXERCISES: CPT | Performed by: PHYSICAL THERAPIST

## 2021-07-13 NOTE — PROGRESS NOTES
Occupational Therapy Daily Treatment Note      Patient: Chiara Zee   : 1960  Referring practitioner: Scott Catalan MD  Date of Initial Visit: Type: THERAPY  Noted: 2021  Today's Date: 2021  Patient seen for 5 sessions         Chiara Zee reports: my fingers are still stiff.        Subjective     Objective   See Exercise, Manual, and Modality Logs for complete treatment.       Assessment/Plan    Visit Diagnoses:    ICD-10-CM ICD-9-CM   1. Closed extra-articular fracture of distal end of right radius with routine healing, subsequent encounter  S52.551D V54.12   2. Closed displaced fracture of styloid process of right ulna with routine healing, subsequent encounter  S52.611D V54.12       Continue per POC         Timed:  Manual Therapy:    14     mins  58939;  Therapeutic Exercise:    15     mins  91176;       Untimed:  Electrical Stimulation:    0     mins  11261 ( );  Fluidotherapy        10    mins  80350    Timed Treatment:   29   mins   Total Treatment:     39   min    Ana Giraldo OTR/L  Occupational Therapist

## 2021-07-15 ENCOUNTER — TREATMENT (OUTPATIENT)
Dept: PHYSICAL THERAPY | Facility: CLINIC | Age: 61
End: 2021-07-15

## 2021-07-15 DIAGNOSIS — S52.551D CLOSED EXTRA-ARTICULAR FRACTURE OF DISTAL END OF RIGHT RADIUS WITH ROUTINE HEALING, SUBSEQUENT ENCOUNTER: Primary | ICD-10-CM

## 2021-07-15 DIAGNOSIS — S52.611D CLOSED DISPLACED FRACTURE OF STYLOID PROCESS OF RIGHT ULNA WITH ROUTINE HEALING, SUBSEQUENT ENCOUNTER: ICD-10-CM

## 2021-07-15 DIAGNOSIS — M25.531 RIGHT WRIST PAIN: ICD-10-CM

## 2021-07-15 PROCEDURE — 97110 THERAPEUTIC EXERCISES: CPT | Performed by: PHYSICAL THERAPIST

## 2021-07-15 PROCEDURE — 97022 WHIRLPOOL THERAPY: CPT | Performed by: PHYSICAL THERAPIST

## 2021-07-15 PROCEDURE — 97140 MANUAL THERAPY 1/> REGIONS: CPT | Performed by: PHYSICAL THERAPIST

## 2021-07-15 NOTE — PROGRESS NOTES
Occupational Therapy Daily Treatment Note      Patient: Chiara Zee   : 1960  Referring practitioner: Scott Catalan MD  Date of Initial Visit: Type: THERAPY  Noted: 2021  Today's Date: 7/15/2021  Patient seen for 6 sessions         Chiara Zee reports: I can almost make a fist.        Subjective     Objective          Strength/Myotome Testing     Right Wrist/Hand      (2nd hand position)     Trial 1: 10 lbs    Trial 2: 10 lbs    Trial 3: 10 lbs    Average: 10 lbs      See Exercise, Manual, and Modality Logs for complete treatment.       Assessment/Plan    Visit Diagnoses:    ICD-10-CM ICD-9-CM   1. Closed extra-articular fracture of distal end of right radius with routine healing, subsequent encounter  S52.551D V54.12   2. Closed displaced fracture of styloid process of right ulna with routine healing, subsequent encounter  S52.611D V54.12   3. Right wrist pain  M25.531 719.43       Continue per POC         Timed:  Manual Therapy:    10     mins  91921;  Therapeutic Exercise:    10     mins  95588;       Untimed:  Electrical Stimulation:    0     mins  98302 ( );  Fluidotherapy        10    mins  85088    Timed Treatment:   20   mins   Total Treatment:     30   min    MAGDALENA Dunn/L  Occupational Therapist

## 2021-07-29 ENCOUNTER — TREATMENT (OUTPATIENT)
Dept: PHYSICAL THERAPY | Facility: CLINIC | Age: 61
End: 2021-07-29

## 2021-07-29 DIAGNOSIS — S52.551D CLOSED EXTRA-ARTICULAR FRACTURE OF DISTAL END OF RIGHT RADIUS WITH ROUTINE HEALING, SUBSEQUENT ENCOUNTER: Primary | ICD-10-CM

## 2021-07-29 DIAGNOSIS — S52.611D CLOSED DISPLACED FRACTURE OF STYLOID PROCESS OF RIGHT ULNA WITH ROUTINE HEALING, SUBSEQUENT ENCOUNTER: ICD-10-CM

## 2021-07-29 DIAGNOSIS — M25.531 RIGHT WRIST PAIN: ICD-10-CM

## 2021-07-29 PROCEDURE — 97022 WHIRLPOOL THERAPY: CPT | Performed by: PHYSICAL THERAPIST

## 2021-07-29 PROCEDURE — 97110 THERAPEUTIC EXERCISES: CPT | Performed by: PHYSICAL THERAPIST

## 2021-07-29 PROCEDURE — 97140 MANUAL THERAPY 1/> REGIONS: CPT | Performed by: PHYSICAL THERAPIST

## 2021-07-29 NOTE — PROGRESS NOTES
Re-Assessment / Re-Certification      Patient: Chiara Zee   : 1960  Diagnosis/ICD-10 Code:  Closed extra-articular fracture of distal end of right radius with routine healing, subsequent encounter [S52.551D]  Referring practitioner: Scott Catalan MD  Date of Initial Visit: Type: THERAPY  Noted: 2021  Today's Date: 2021  Patient seen for 7 sessions      Subjective:   Chiara Zee reports: it doesn't hurt today.  Clinical Progress: improved  Home Program Compliance: Yes  Treatment has included: therapeutic exercise, neuromuscular re-education, manual therapy and fluidotherapy    Subjective Evaluation    Pain  Current pain ratin         Objective          Observations     Right Wrist/Hand   Positive for incision.       Neurological Testing     Sensation     Wrist/Hand     Right   Hypersensation: light touch    Active Range of Motion     Right Elbow   Forearm supination: 15 degrees   Forearm pronation: 85 degrees     Right Wrist   Wrist flexion: 45 degrees   Wrist extension: 25 degrees   Radial deviation: 10 degrees   Ulnar deviation: 5 degrees     Right Thumb   Flexion     MP: 40 degrees    DIP: 10 degrees    Right Digits   Flexion   Index     MCP: 75 degrees    PIP: 85 degrees    DIP: 45 degrees  Middle     MCP: 80 degrees    PIP: 90 degrees    DIP: 45 degrees  Ring     MCP: 75 degrees    PIP: 90 degrees    DIP: 45 degrees  Little     MCP: 80 degrees    PIP: 90 degrees    DIP: 45 degrees    Additional Active Range of Motion Details  TREVIZO   Index 205  Middle 215  Ring 210  Small 215      Strength/Myotome Testing     Left Wrist/Hand      (2nd hand position)     Trial 1: 60 lbs    Trial 2: 55 lbs    Trial 3: 58 lbs    Average: 57.67 lbs    Right Wrist/Hand      (2nd hand position)     Trial 1: 15 lbs    Trial 2: 15 lbs    Trial 3: 15 lbs    Average: 15 lbs     General Comments     Wrist/Hand Comments  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 The patient complains of pain in the R wrist.                  LTG 1: 12 weeks:  The patient will report a pain rating of 2/10 or better  With movement in order to improve sleep quality and tolerance to performance of activities of daily living.                                  STATUS:  Met                  STG 1a: 6weeks:  The patient will report a pain rating of 5/10 or better with movement.                                   STATUS: Met  2. The patient has limited ROM of the R wrist and hand.                  LTG 2: 12 weeks:  The patient will demonstrate 50 degrees of wrist flex/ext and 70 degrees supination as well as 240 degrees TREVIZO digits to allow the patient to grasp utensils for cooking and eating.                                  STATUS:  Not met                   STG 2a: 6 weeks:  The patient will demonstrate 30 degrees of wrist flex/ext and supination as well as 200 degrees TREVIZO digits.                                  STATUS:  Partially met                             3. The patient has limited strength of the R hand.                  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                                  STATUS:  Not met                  STG 3a: 6 weeks:  The patient will demonstrate good tolerance to  strength testing.                                  STATUS:  Met  4. Carrying, Moving, and Handling Objects Functional Limitation                                   LTG 4: 12 weeks:  The patient will demonstrate 0% limitation by achieving a score of 11/55 on the Quick DASH.                                   STATUS:  New                  STG 4a: 6 weeks:  The patient will demonstrate 20-39% limitation by achieving a score of 20/55 on the Quick DASH.                                    STATUS:  New      Plan  Planned modality interventions: TENS and thermotherapy (hydrocollator packs)  Other planned modality interventions: Fluidotherapy  Planned therapy interventions: neuromuscular re-education, manual therapy, soft tissue mobilization, strengthening, stretching, home exercise program, functional ROM exercises and fine motor coordination training  Frequency: 2x week  Duration in weeks: 8  Treatment plan discussed with: patient        Visit Diagnoses:    ICD-10-CM ICD-9-CM   1. Closed extra-articular fracture of distal end of right radius with routine healing, subsequent encounter  S52.551D V54.12   2. Closed displaced fracture of styloid process of right ulna with routine healing, subsequent encounter  S52.611D V54.12   3. Right wrist pain  M25.531 719.43       Progress toward previous goals: Partially Met        Recommendations: Continue as planned  Timeframe: 2 months  Prognosis to achieve goals: good    OT Signature: MAGDALENA Dunn/BERT      Timed:  Manual Therapy:    10     mins  49987;  Therapeutic Exercise:    15     mins  28570;       Untimed:  Electrical Stimulation:    0     mins  65655 ( );  Fluidotherapy:     10     mins  59900    Timed Treatment:   25   mins   Total Treatment:     35   mins

## 2021-08-03 ENCOUNTER — TREATMENT (OUTPATIENT)
Dept: PHYSICAL THERAPY | Facility: CLINIC | Age: 61
End: 2021-08-03

## 2021-08-03 DIAGNOSIS — S52.611D CLOSED DISPLACED FRACTURE OF STYLOID PROCESS OF RIGHT ULNA WITH ROUTINE HEALING, SUBSEQUENT ENCOUNTER: ICD-10-CM

## 2021-08-03 DIAGNOSIS — S52.551D CLOSED EXTRA-ARTICULAR FRACTURE OF DISTAL END OF RIGHT RADIUS WITH ROUTINE HEALING, SUBSEQUENT ENCOUNTER: Primary | ICD-10-CM

## 2021-08-03 DIAGNOSIS — M25.531 RIGHT WRIST PAIN: ICD-10-CM

## 2021-08-03 PROCEDURE — 97140 MANUAL THERAPY 1/> REGIONS: CPT | Performed by: PHYSICAL THERAPIST

## 2021-08-03 PROCEDURE — 97110 THERAPEUTIC EXERCISES: CPT | Performed by: PHYSICAL THERAPIST

## 2021-08-03 PROCEDURE — 97022 WHIRLPOOL THERAPY: CPT | Performed by: PHYSICAL THERAPIST

## 2021-08-05 ENCOUNTER — TREATMENT (OUTPATIENT)
Dept: PHYSICAL THERAPY | Facility: CLINIC | Age: 61
End: 2021-08-05

## 2021-08-05 DIAGNOSIS — S52.551D CLOSED EXTRA-ARTICULAR FRACTURE OF DISTAL END OF RIGHT RADIUS WITH ROUTINE HEALING, SUBSEQUENT ENCOUNTER: Primary | ICD-10-CM

## 2021-08-05 DIAGNOSIS — S52.611D CLOSED DISPLACED FRACTURE OF STYLOID PROCESS OF RIGHT ULNA WITH ROUTINE HEALING, SUBSEQUENT ENCOUNTER: ICD-10-CM

## 2021-08-05 DIAGNOSIS — M25.531 RIGHT WRIST PAIN: ICD-10-CM

## 2021-08-05 PROCEDURE — 97022 WHIRLPOOL THERAPY: CPT | Performed by: PHYSICAL THERAPIST

## 2021-08-05 PROCEDURE — 97140 MANUAL THERAPY 1/> REGIONS: CPT | Performed by: PHYSICAL THERAPIST

## 2021-08-05 PROCEDURE — 97110 THERAPEUTIC EXERCISES: CPT | Performed by: PHYSICAL THERAPIST

## 2021-08-05 NOTE — PROGRESS NOTES
Occupational Therapy Daily Treatment Note      Patient: Chiara Zee   : 1960  Referring practitioner: Scott Catalan MD  Date of Initial Visit: Type: THERAPY  Noted: 2021  Today's Date: 2021  Patient seen for 9 sessions         Chiara Zee reports: no pain today.        Subjective     Objective   See Exercise, Manual, and Modality Logs for complete treatment.       Assessment/Plan    Visit Diagnoses:    ICD-10-CM ICD-9-CM   1. Closed extra-articular fracture of distal end of right radius with routine healing, subsequent encounter  S52.551D V54.12   2. Closed displaced fracture of styloid process of right ulna with routine healing, subsequent encounter  S52.611D V54.12   3. Right wrist pain  M25.531 719.43       Continue per POC         Timed:  Manual Therapy:    8     mins  84576;  Therapeutic Exercise:    15     mins  85561;       Untimed:  Electrical Stimulation:    0     mins  09840 ( );  Fluidotherapy        10    mins  45758    Timed Treatment:   23   mins   Total Treatment:     33   min    Ana Giraldo OTR/L  Occupational Therapist

## 2021-08-10 ENCOUNTER — TREATMENT (OUTPATIENT)
Dept: PHYSICAL THERAPY | Facility: CLINIC | Age: 61
End: 2021-08-10

## 2021-08-10 DIAGNOSIS — S52.551D CLOSED EXTRA-ARTICULAR FRACTURE OF DISTAL END OF RIGHT RADIUS WITH ROUTINE HEALING, SUBSEQUENT ENCOUNTER: Primary | ICD-10-CM

## 2021-08-10 DIAGNOSIS — S52.611D CLOSED DISPLACED FRACTURE OF STYLOID PROCESS OF RIGHT ULNA WITH ROUTINE HEALING, SUBSEQUENT ENCOUNTER: ICD-10-CM

## 2021-08-10 DIAGNOSIS — M25.531 RIGHT WRIST PAIN: ICD-10-CM

## 2021-08-10 PROCEDURE — 97110 THERAPEUTIC EXERCISES: CPT | Performed by: PHYSICAL THERAPIST

## 2021-08-10 PROCEDURE — 97022 WHIRLPOOL THERAPY: CPT | Performed by: PHYSICAL THERAPIST

## 2021-08-10 NOTE — PROGRESS NOTES
Occupational Therapy Daily Treatment Note      Patient: Chiara Zee   : 1960  Referring practitioner: Scott Catalan MD  Date of Initial Visit: Type: THERAPY  Noted: 2021  Today's Date: 8/10/2021  Patient seen for 10 sessions         Chiara Zee reports: They said I can go back to work Sept 2 one handed.        Subjective     Objective   See Exercise, Manual, and Modality Logs for complete treatment.       Assessment/Plan    Visit Diagnoses:  No diagnosis found.    Continue per POC         Timed:  Manual Therapy:    0     mins  42570;  Therapeutic Exercise:    20     mins  34837;       Untimed:  Electrical Stimulation:    0     mins  85662 ( );  Fluidotherapy        10    mins  72345    Timed Treatment:   20   mins   Total Treatment:     30   min    MAGDALENA Dunn/L  Occupational Therapist

## 2021-08-12 ENCOUNTER — TREATMENT (OUTPATIENT)
Dept: PHYSICAL THERAPY | Facility: CLINIC | Age: 61
End: 2021-08-12

## 2021-08-12 DIAGNOSIS — M25.531 RIGHT WRIST PAIN: ICD-10-CM

## 2021-08-12 DIAGNOSIS — S52.551D CLOSED EXTRA-ARTICULAR FRACTURE OF DISTAL END OF RIGHT RADIUS WITH ROUTINE HEALING, SUBSEQUENT ENCOUNTER: Primary | ICD-10-CM

## 2021-08-12 DIAGNOSIS — S52.611D CLOSED DISPLACED FRACTURE OF STYLOID PROCESS OF RIGHT ULNA WITH ROUTINE HEALING, SUBSEQUENT ENCOUNTER: ICD-10-CM

## 2021-08-12 PROCEDURE — 97110 THERAPEUTIC EXERCISES: CPT | Performed by: PHYSICAL THERAPIST

## 2021-08-12 PROCEDURE — 97022 WHIRLPOOL THERAPY: CPT | Performed by: PHYSICAL THERAPIST

## 2021-08-12 NOTE — PROGRESS NOTES
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        Subjective     Objective   See Exercise, Manual, and Modality Logs for complete treatment.       Assessment/Plan    Visit Diagnoses:    ICD-10-CM ICD-9-CM   1. Closed extra-articular fracture of distal end of right radius with routine healing, subsequent encounter  S52.551D V54.12   2. Closed displaced fracture of styloid process of right ulna with routine healing, subsequent encounter  S52.611D V54.12   3. Right wrist pain  M25.531 719.43     Pt was able to lift 5 lbs with one hand 10 reps from waist to shoulder. Advance as tolerated each visit to prepare to return to work.    Continue per POC         Timed:  Manual Therapy:    0     mins  09309;  Therapeutic Exercise:    20     mins  75135;       Untimed:  Electrical Stimulation:    0     mins  85958 ( );  Fluidotherapy        10    mins  15542    Timed Treatment:   20   mins   Total Treatment:     30   min    MAGDALENA Dunn/L  Occupational Therapist

## 2021-08-18 ENCOUNTER — TREATMENT (OUTPATIENT)
Dept: PHYSICAL THERAPY | Facility: CLINIC | Age: 61
End: 2021-08-18

## 2021-08-18 DIAGNOSIS — S52.551D CLOSED EXTRA-ARTICULAR FRACTURE OF DISTAL END OF RIGHT RADIUS WITH ROUTINE HEALING, SUBSEQUENT ENCOUNTER: Primary | ICD-10-CM

## 2021-08-18 DIAGNOSIS — S52.611D CLOSED DISPLACED FRACTURE OF STYLOID PROCESS OF RIGHT ULNA WITH ROUTINE HEALING, SUBSEQUENT ENCOUNTER: ICD-10-CM

## 2021-08-18 PROCEDURE — 97022 WHIRLPOOL THERAPY: CPT | Performed by: PHYSICAL THERAPIST

## 2021-08-18 PROCEDURE — 97110 THERAPEUTIC EXERCISES: CPT | Performed by: PHYSICAL THERAPIST

## 2021-08-18 NOTE — PROGRESS NOTES
Occupational Therapy Daily Treatment Note      Patient: Chiara Zee   : 1960  Referring practitioner: Scott Catalan MD  Date of Initial Visit: Type: THERAPY  Noted: 2021  Today's Date: 2021  Patient seen for 12 sessions         Chiara Zee reports: I had to lift a pot of sound out of the fridge and it was weak and took it very slow.        Subjective     Objective   See Exercise, Manual, and Modality Logs for complete treatment.       Assessment/Plan    Visit Diagnoses:    ICD-10-CM ICD-9-CM   1. Closed extra-articular fracture of distal end of right radius with routine healing, subsequent encounter  S52.551D V54.12   2. Closed displaced fracture of styloid process of right ulna with routine healing, subsequent encounter  S52.611D V54.12     Pt was able to lift 15 lbs from waist to shoulder for 10 reps and 7 lbs one handed 15 reps and no c/o pain.    Continue per POC         Timed:  Manual Therapy:    0     mins  08885;  Therapeutic Exercise:    20     mins  46648;       Untimed:  Electrical Stimulation:    0     mins  59954 ( );  Fluidotherapy        10    mins  11235    Timed Treatment:   20   mins   Total Treatment:     30   min    MAGDALENA Dunn/L  Occupational Therapist

## 2021-08-20 ENCOUNTER — TREATMENT (OUTPATIENT)
Dept: PHYSICAL THERAPY | Facility: CLINIC | Age: 61
End: 2021-08-20

## 2021-08-20 DIAGNOSIS — S52.611D CLOSED DISPLACED FRACTURE OF STYLOID PROCESS OF RIGHT ULNA WITH ROUTINE HEALING, SUBSEQUENT ENCOUNTER: ICD-10-CM

## 2021-08-20 DIAGNOSIS — S52.551D CLOSED EXTRA-ARTICULAR FRACTURE OF DISTAL END OF RIGHT RADIUS WITH ROUTINE HEALING, SUBSEQUENT ENCOUNTER: Primary | ICD-10-CM

## 2021-08-20 PROCEDURE — 97110 THERAPEUTIC EXERCISES: CPT | Performed by: PHYSICAL THERAPIST

## 2021-08-20 PROCEDURE — 97022 WHIRLPOOL THERAPY: CPT | Performed by: PHYSICAL THERAPIST

## 2021-08-20 NOTE — PROGRESS NOTES
Occupational Therapy Daily Treatment Note      Patient: Chiara Zee   : 1960  Referring practitioner: Scott Catalan MD  Date of Initial Visit: Type: THERAPY  Noted: 2021  Today's Date: 2021  Patient seen for 13 sessions         Chiara Zee reports: no pain today.        Subjective     Objective   See Exercise, Manual, and Modality Logs for complete treatment.       Assessment/Plan    Visit Diagnoses:    ICD-10-CM ICD-9-CM   1. Closed extra-articular fracture of distal end of right radius with routine healing, subsequent encounter  S52.551D V54.12   2. Closed displaced fracture of styloid process of right ulna with routine healing, subsequent encounter  S52.611D V54.12     Pt able to lift 20 lbs waist to shoulder with no difficulties or rest breaks.    Continue per POC increasing strengtheing as tolerated to prepare to return to work.         Timed:  Manual Therapy:    0     mins  89006;  Therapeutic Exercise:    25    mins  35133;       Untimed:  Electrical Stimulation:    0     mins  59211 ( );  Fluidotherapy        10    mins  14437    Timed Treatment:   25   mins   Total Treatment:     35   min    Ana Giraldo OTR/L  Occupational Therapist

## 2021-08-24 ENCOUNTER — TREATMENT (OUTPATIENT)
Dept: PHYSICAL THERAPY | Facility: CLINIC | Age: 61
End: 2021-08-24

## 2021-08-24 DIAGNOSIS — M25.531 RIGHT WRIST PAIN: ICD-10-CM

## 2021-08-24 DIAGNOSIS — S52.551D CLOSED EXTRA-ARTICULAR FRACTURE OF DISTAL END OF RIGHT RADIUS WITH ROUTINE HEALING, SUBSEQUENT ENCOUNTER: Primary | ICD-10-CM

## 2021-08-24 DIAGNOSIS — S52.611D CLOSED DISPLACED FRACTURE OF STYLOID PROCESS OF RIGHT ULNA WITH ROUTINE HEALING, SUBSEQUENT ENCOUNTER: ICD-10-CM

## 2021-08-24 PROCEDURE — 97022 WHIRLPOOL THERAPY: CPT | Performed by: PHYSICAL THERAPIST

## 2021-08-24 PROCEDURE — 97110 THERAPEUTIC EXERCISES: CPT | Performed by: PHYSICAL THERAPIST

## 2021-08-24 NOTE — PROGRESS NOTES
Occupational Therapy Daily Treatment Note      Patient: Chiara Zee   : 1960  Referring practitioner: Scott Catalan MD  Date of Initial Visit: Type: THERAPY  Noted: 2021  Today's Date: 2021  Patient seen for 14 sessions         Chiara Zee reports: I helped a friend move some furniture over the weekend but I took it easy. I am ready to go back to work.        Subjective     Objective   See Exercise, Manual, and Modality Logs for complete treatment.       Assessment/Plan    Visit Diagnoses:    ICD-10-CM ICD-9-CM   1. Closed extra-articular fracture of distal end of right radius with routine healing, subsequent encounter  S52.551D V54.12   2. Closed displaced fracture of styloid process of right ulna with routine healing, subsequent encounter  S52.611D V54.12   3. Right wrist pain  M25.531 719.43       Continue per POC         Timed:  Manual Therapy:    0     mins  92740;  Therapeutic Exercise:    25   mins  57934;       Untimed:  Electrical Stimulation:    0     mins  23675 ( );  Fluidotherapy        10    mins  54102    Timed Treatment:   25   mins   Total Treatment:     35   min    Ana Giraldo OTR/L  Occupational Therapist

## 2021-08-26 ENCOUNTER — TREATMENT (OUTPATIENT)
Dept: PHYSICAL THERAPY | Facility: CLINIC | Age: 61
End: 2021-08-26

## 2021-08-26 DIAGNOSIS — S52.551D CLOSED EXTRA-ARTICULAR FRACTURE OF DISTAL END OF RIGHT RADIUS WITH ROUTINE HEALING, SUBSEQUENT ENCOUNTER: Primary | ICD-10-CM

## 2021-08-26 DIAGNOSIS — S52.611D CLOSED DISPLACED FRACTURE OF STYLOID PROCESS OF RIGHT ULNA WITH ROUTINE HEALING, SUBSEQUENT ENCOUNTER: ICD-10-CM

## 2021-08-26 PROCEDURE — 97110 THERAPEUTIC EXERCISES: CPT | Performed by: PHYSICAL THERAPIST

## 2021-08-26 NOTE — PROGRESS NOTES
Occupational Therapy Daily Treatment Note      Patient: Chiara Zee   : 1960  Referring practitioner: Scott Catalan MD  Date of Initial Visit: Type: THERAPY  Noted: 2021  Today's Date: 2021  Patient seen for 15 sessions         Chiara Zee reports: she is doing good.        Subjective     Objective   See Exercise, Manual, and Modality Logs for complete treatment.       Assessment/Plan    Visit Diagnoses:    ICD-10-CM ICD-9-CM   1. Closed extra-articular fracture of distal end of right radius with routine healing, subsequent encounter  S52.551D V54.12   2. Closed displaced fracture of styloid process of right ulna with routine healing, subsequent encounter  S52.611D V54.12     Pt was unable to lift 30 lbs from waist to shoulder but continued 10 reps at 25 lbs.    Continue per POC         Timed:  Manual Therapy:    0     mins  58148;  Therapeutic Exercise:    25     mins  29724;       Timed Treatment:   25   mins   Total Treatment:     25   min    Ana Giraldo OTR/L  Occupational Therapist

## 2021-08-31 ENCOUNTER — TREATMENT (OUTPATIENT)
Dept: PHYSICAL THERAPY | Facility: CLINIC | Age: 61
End: 2021-08-31

## 2021-08-31 DIAGNOSIS — S52.611D CLOSED DISPLACED FRACTURE OF STYLOID PROCESS OF RIGHT ULNA WITH ROUTINE HEALING, SUBSEQUENT ENCOUNTER: ICD-10-CM

## 2021-08-31 DIAGNOSIS — S52.551D CLOSED EXTRA-ARTICULAR FRACTURE OF DISTAL END OF RIGHT RADIUS WITH ROUTINE HEALING, SUBSEQUENT ENCOUNTER: Primary | ICD-10-CM

## 2021-08-31 PROCEDURE — 97110 THERAPEUTIC EXERCISES: CPT | Performed by: PHYSICAL THERAPIST

## 2021-08-31 NOTE — PROGRESS NOTES
Occupational Therapy Daily Treatment Note      Patient: Chiara Zee   : 1960  Referring practitioner: Scott Catalan MD  Date of Initial Visit: No linked episodes  Today's Date: 2021  Patient seen for 16 sessions         Chiara Zee reports: she feels ready for work on Thursday and pleased with her progress.        Subjective     Objective          Active Range of Motion     Right Elbow   Forearm supination: 15 degrees   Forearm pronation: 85 degrees     Right Wrist   Wrist flexion: 65 degrees   Wrist extension: 30 degrees   Radial deviation: 10 degrees   Ulnar deviation: 15 degrees     Right Thumb   Flexion     MP: 40 degrees    DIP: 10 degrees    Right Digits   Flexion   Index     MCP: 80 degrees    PIP: 100 degrees    DIP: 45 degrees  Middle     MCP: 85 degrees    PIP: 90 degrees    DIP: 50 degrees  Ring     MCP: 85 degrees    PIP: 100 degrees    DIP: 50 degrees  Little     MCP: 85 degrees    PIP: 100 degrees    DIP: 55 degrees    Additional Active Range of Motion Details  TREVIZO   Index 225  Middle 225  Ring 235  Small 240      Strength/Myotome Testing     Left Wrist/Hand      (2nd hand position)     Trial 1: 60 lbs    Trial 2: 55 lbs    Trial 3: 58 lbs    Average: 57.67 lbs    Right Wrist/Hand      (2nd hand position)     Trial 1: 28 lbs    Trial 2: 25 lbs    Trial 3: 26 lbs    Average: 26.33 lbs     General Comments     Wrist/Hand Comments  Pt reports history of B CTS     See Exercise, Manual, and Modality Logs for complete treatment.       Assessment & Plan       Goals  Plan Goals: Plan Goals: 1. The patient complains of pain in the R wrist.                  LTG 1: 12 weeks:  The patient will report a pain rating of 2/10 or better  With movement in order to improve sleep quality and tolerance to performance of activities of daily living.                                  STATUS:  Met                  STG 1a: 6weeks:  The patient will report a pain rating of 5/10 or  better with movement.                                   STATUS:  Met  2. The patient has limited ROM of the R wrist and hand.                  LTG 2: 12 weeks:  The patient will demonstrate 50 degrees of wrist flex/ext and 70 degrees supination as well as 240 degrees TREVIZO digits to allow the patient to grasp utensils for cooking and eating.                                  STATUS:  Partially met                   STG 2a: 6 weeks:  The patient will demonstrate 30 degrees of wrist flex/ext and supination as well as 200 degrees TREVIZO digits.                                  STATUS:  Met                             3. The patient has limited strength of the R hand.                  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                                  STATUS:  Not met                  STG 3a: 6 weeks:  The patient will demonstrate good tolerance to  strength testing.                                  STATUS:  Met  4. Carrying, Moving, and Handling Objects Functional Limitation                                   LTG 4: 12 weeks:  The patient will demonstrate 0% limitation by achieving a score of 11/55 on the Quick DASH.                                  STATUS: Not met                  STG 4a: 6 weeks:  The patient will demonstrate 20-39% limitation by achieving a score of 20/55 on the Quick DASH.                                    STATUS:  Met                          Visit Diagnoses:    ICD-10-CM ICD-9-CM   1. Closed extra-articular fracture of distal end of right radius with routine healing, subsequent encounter  S52.551D V54.12   2. Closed displaced fracture of styloid process of right ulna with routine healing, subsequent encounter  S52.611D V54.12     Pt returning to work on Thursday.  Discharge from Occupational Therapy.         Timed:  Manual Therapy:    0     mins  19938;  Therapeutic Exercise:    30     mins  68031;       Timed Treatment:    30   mins   Total Treatment:     30   min    Ana Giraldo OTR/L  Occupational Therapist

## 2021-10-28 ENCOUNTER — HOSPITAL ENCOUNTER (OUTPATIENT)
Dept: MAMMOGRAPHY | Facility: HOSPITAL | Age: 61
Discharge: HOME | End: 2021-10-28
Admitting: NURSE PRACTITIONER

## 2021-10-28 DIAGNOSIS — Z12.31 VISIT FOR SCREENING MAMMOGRAM: ICD-10-CM

## 2021-10-28 PROCEDURE — 77063 BREAST TOMOSYNTHESIS BI: CPT

## 2021-10-28 PROCEDURE — 77067 SCR MAMMO BI INCL CAD: CPT

## 2021-12-15 ENCOUNTER — OFFICE VISIT (OUTPATIENT)
Dept: PULMONOLOGY | Facility: CLINIC | Age: 61
End: 2021-12-15

## 2021-12-15 VITALS
SYSTOLIC BLOOD PRESSURE: 158 MMHG | WEIGHT: 264.8 LBS | HEART RATE: 56 BPM | BODY MASS INDEX: 44.12 KG/M2 | OXYGEN SATURATION: 98 % | TEMPERATURE: 97.7 F | DIASTOLIC BLOOD PRESSURE: 64 MMHG | HEIGHT: 65 IN | RESPIRATION RATE: 18 BRPM

## 2021-12-15 DIAGNOSIS — F17.210 SMOKING GREATER THAN 40 PACK YEARS: ICD-10-CM

## 2021-12-15 DIAGNOSIS — K21.00 GASTROESOPHAGEAL REFLUX DISEASE WITH ESOPHAGITIS WITHOUT HEMORRHAGE: ICD-10-CM

## 2021-12-15 DIAGNOSIS — J30.9 ALLERGIC RHINITIS, UNSPECIFIED SEASONALITY, UNSPECIFIED TRIGGER: Primary | ICD-10-CM

## 2021-12-15 DIAGNOSIS — R91.1 LUNG NODULE: ICD-10-CM

## 2021-12-15 DIAGNOSIS — G47.33 OSA (OBSTRUCTIVE SLEEP APNEA): ICD-10-CM

## 2021-12-15 DIAGNOSIS — J43.9 PULMONARY EMPHYSEMA, UNSPECIFIED EMPHYSEMA TYPE (HCC): ICD-10-CM

## 2021-12-15 DIAGNOSIS — Z92.29 HISTORY OF VACCINATION: ICD-10-CM

## 2021-12-15 PROBLEM — K21.9 GASTROESOPHAGEAL REFLUX DISEASE: Status: ACTIVE | Noted: 2021-05-21

## 2021-12-15 PROBLEM — E78.5 HYPERLIPIDEMIA: Status: ACTIVE | Noted: 2020-10-28

## 2021-12-15 PROBLEM — I10 ESSENTIAL HYPERTENSION: Status: ACTIVE | Noted: 2020-10-28

## 2021-12-15 PROBLEM — J44.9 CHRONIC OBSTRUCTIVE PULMONARY DISEASE: Status: ACTIVE | Noted: 2021-05-21

## 2021-12-15 PROCEDURE — 99214 OFFICE O/P EST MOD 30 MIN: CPT | Performed by: INTERNAL MEDICINE

## 2021-12-15 RX ORDER — PANTOPRAZOLE SODIUM 40 MG/1
40 TABLET, DELAYED RELEASE ORAL NIGHTLY
COMMUNITY
Start: 2021-10-28

## 2021-12-15 RX ORDER — METOPROLOL SUCCINATE 25 MG/1
25 TABLET, EXTENDED RELEASE ORAL NIGHTLY
COMMUNITY

## 2021-12-15 RX ORDER — RIVAROXABAN 2.5 MG/1
2.5 TABLET, FILM COATED ORAL 2 TIMES DAILY WITH MEALS
COMMUNITY
Start: 2021-12-07

## 2021-12-15 RX ORDER — MONTELUKAST SODIUM 10 MG/1
10 TABLET ORAL
Qty: 90 TABLET | Refills: 4 | Status: SHIPPED | OUTPATIENT
Start: 2021-12-15 | End: 2022-07-26

## 2021-12-15 RX ORDER — RANITIDINE 150 MG/1
CAPSULE ORAL
COMMUNITY
End: 2022-07-26

## 2021-12-15 RX ORDER — LORATADINE 10 MG/1
TABLET ORAL
COMMUNITY
End: 2022-07-26

## 2021-12-15 RX ORDER — MONTELUKAST SODIUM 10 MG/1
10 TABLET ORAL
COMMUNITY
Start: 2021-10-28 | End: 2021-12-15 | Stop reason: SDUPTHER

## 2021-12-15 RX ORDER — SERTRALINE HYDROCHLORIDE 25 MG/1
TABLET, FILM COATED ORAL
COMMUNITY
End: 2022-06-14 | Stop reason: SDUPTHER

## 2021-12-15 RX ORDER — HYDROCODONE BITARTRATE AND ACETAMINOPHEN 5; 325 MG/1; MG/1
TABLET ORAL
COMMUNITY
Start: 2021-05-19 | End: 2022-07-26

## 2021-12-15 RX ORDER — ERGOCALCIFEROL 1.25 MG/1
2000 CAPSULE ORAL DAILY
COMMUNITY
End: 2022-07-26

## 2021-12-15 RX ORDER — MELOXICAM 15 MG/1
15 TABLET ORAL DAILY
COMMUNITY
Start: 2021-11-29

## 2021-12-15 RX ORDER — ALBUTEROL SULFATE 90 UG/1
2 AEROSOL, METERED RESPIRATORY (INHALATION) EVERY 4 HOURS PRN
Qty: 1 G | Refills: 11 | Status: SHIPPED | OUTPATIENT
Start: 2021-12-15

## 2021-12-15 RX ORDER — ATORVASTATIN CALCIUM 20 MG/1
20 TABLET, FILM COATED ORAL NIGHTLY
COMMUNITY
Start: 2021-10-28

## 2021-12-15 RX ORDER — BUSPIRONE HYDROCHLORIDE 5 MG/1
10 TABLET ORAL
COMMUNITY
End: 2021-12-15 | Stop reason: SDUPTHER

## 2021-12-15 RX ORDER — BUSPIRONE HYDROCHLORIDE 10 MG/1
10 TABLET ORAL 3 TIMES DAILY
COMMUNITY
Start: 2021-10-28

## 2021-12-15 RX ORDER — ALBUTEROL SULFATE 90 UG/1
2 AEROSOL, METERED RESPIRATORY (INHALATION)
COMMUNITY
Start: 2021-10-28 | End: 2021-12-15 | Stop reason: SDUPTHER

## 2021-12-15 RX ORDER — FLUTICASONE PROPIONATE 220 UG/1
2 AEROSOL, METERED RESPIRATORY (INHALATION)
Qty: 12 G | Refills: 11 | Status: SHIPPED | OUTPATIENT
Start: 2021-12-15 | End: 2022-07-26

## 2021-12-15 NOTE — PROGRESS NOTES
Primary Care Provider  Jaleesa Piper APRN     Referring Provider  No ref. provider found     Chief Complaint  COPD, Shortness of Breath, and Follow-up (3-4 month)    Subjective          Chiara Zee presents to [facility] PULMONARY [facility]ICAL [facility]ARE MEDICINE  History of Present Illness  Chiara Zee is a 61 y.o. female patient with history of recurrent exudative pleural effusion, right lower lobe pulmonary nodule 0.2 cm in size, former tobacco abuse of cigarettes in remission, obesity, obstructive sleep apnea.  She is here for follow-up.  Since her last office visit, patient has gained more than 15 pounds.  She is short of breath with minimal exertion.  Even walking from kitchen to the bedroom, which is less than 25 feet, she is out of breath.  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  She has rescue inhaler at home but does not use it.  She has been using Stiolto and did not know when to use albuterol.  Her roommate smokes around her.  She used to smoke heavily but quit smoking 5 years ago.  She also had pleural effusion which has not been tapped for the last 1-1/2 years at the very least.  She has no cough, no wheezing.  She is a compliant with her CPAP machine, uses full facemask.  Her CPAP supplies is from [employer].  She already had flu and pneumonia shot, is due for booster in Covid shot.  She took Covid shots in the spring.  She has not had low-dose lung cancer screening CT scan for this year.    Review of Systems     General:  No Fatigue, No Fever No weight loss or loss of appetite  HEENT: No dysphagia, No Visual Changes, no rhinorrhea  Respiratory:  + Minimal cough,+Dyspnea, no phlegm, No Pleuritic Pain, no wheezing, no hemoptysis.  Cardiovascular: Denies chest pain, denies palpitations,+JHAVERI, No Chest Pressure  Gastrointestinal:  No Abdominal Pain, No Nausea, No Vomiting, No Diarrhea  Genitourinary:  No Dysuria, No Frequency, No Hesitancy  Musculoskeletal:  No muscle pain or swelling  Endocrine:  No Heat Intolerance, No Cold Intolerance, No Fatigue  Hematologic:  No Bleeding, No Bruising  Psychiatric:  No Anxiety, No Depression  Neurologic:  No Confusion, no Dysarthria, No Headaches  Skin:  No Rash, No Open Wounds    Family History   Problem Relation Age of Onset   • Diabetes Sister    • Diabetes Brother         Social History     Socioeconomic History   • Marital status:    Tobacco Use   • Smoking status: Former Smoker     Packs/day: 1.00     Years: 45.00     Pack years: 45.00     Types: Cigarettes     Quit date:      Years since quittin.9   • Smokeless tobacco: Never Used   Vaping Use   • Vaping Use: Never used   Substance and Sexual Activity   • Alcohol use: Never   • Drug use: Never   • Sexual activity: Defer        Past Medical History:   Diagnosis Date   • COPD (chronic obstructive pulmonary disease) ([facility])    • Hypertension         Immunization History   Administered Date(s) Administered   • COVID-19 (PFIZER) 2021, 2021   • Flu Vaccine Quad PF >36MO 10/28/2020, 10/28/2021   • Fluzone High Dose =>65 Years ([employer] ONLY) 2021   • Pneumococcal Polysaccharide (PPSV23) 2020   • Shingrix 2021, 2021   • Tdap 2020         No Known Allergies       Current Outpatient Medications:   •  albuterol sulfate  (90 Base) MCG/ACT inhaler, Inhale 2 puffs Every 4 (Four) Hours As Needed for Wheezing., Disp: 1 g, Rfl: 11  •  atorvastatin (LIPITOR) 20 MG tablet, Take 20 mg by mouth Daily., Disp: , Rfl:   •  busPIRone (BUSPAR) 10 MG tablet, Take 10 mg by mouth 3 (Three) Times a Day., Disp: , Rfl:   •  Cetirizine HCl 10 MG capsule, Take 1 capsule by mouth Daily., Disp: 30 capsule, Rfl: 11  •  diphenhydrAMINE (SOMINEX) 25 MG tablet, Allergy 25 mg oral tablet 10 mg qid   Active, Disp: , Rfl:   •  loratadine (CLARITIN) 10 MG tablet, loratadine 10 mg oral tablet take 1 tablet (10 mg) by oral route once daily   Active, Disp: ,  "Rfl:   •  meloxicam (MOBIC) 15 MG tablet, Take 15 mg by mouth Daily., Disp: , Rfl:   •  metoprolol succinate XL (TOPROL-XL) 25 MG 24 hr tablet, , Disp: , Rfl:   •  montelukast (SINGULAIR) 10 MG tablet, Take 1 tablet by mouth every night at bedtime., Disp: 90 tablet, Rfl: 4  •  sertraline (ZOLOFT) 25 MG tablet, sertraline 25 mg oral tablet take 1 tablet (25 mg) by oral route once daily   Active, Disp: , Rfl:   •  Xarelto 2.5 MG tablet, Take 2.5 mg by mouth 2 (Two) Times a Day With Meals., Disp: , Rfl:   •  Cholecalciferol 50 MCG (2000 UT) capsule, Vitamin D3 2,000 unit oral capsule take 1 capsule by oral route 2 times a day   Active, Disp: , Rfl:   •  ergocalciferol (ERGOCALCIFEROL) 1.25 MG (21622 UT) capsule, Take 2,000 Units by mouth Daily., Disp: , Rfl:   •  fluticasone (FLOVENT HFA) 220 MCG/ACT inhaler, Inhale 2 puffs 2 (Two) Times a Day., Disp: 12 g, Rfl: 11  •  HYDROcodone-acetaminophen (NORCO) 5-325 MG per tablet, , Disp: , Rfl:   •  Inulin 2 g chewable tablet, , Disp: , Rfl:   •  pantoprazole (PROTONIX) 40 MG EC tablet, Take 40 mg by mouth Daily., Disp: , Rfl:   •  raNITIdine (ZANTAC) 150 MG capsule, ranitidine HCl 150 mg oral capsule take 1 capsule (150 mg) by oral route 2 times per day   Active, Disp: , Rfl:      Objective   Vital Signs:   /64 (BP Location: Left arm, Patient Position: Sitting, Cuff Size: Adult)   Pulse 56   Temp 97.7 °F (36.5 °C) (Tympanic)   Resp 18   Ht 165.1 cm (65\")   Wt 120 kg (264 lb 12.8 oz)   SpO2 98% Comment: room air  BMI 44.07 kg/m²     Mallampatti classification : 1  Physical Exam  Vital Signs Reviewed  Obese female, in no acute distress, normal conversant  HEENT:  PERRL, EOMI.  OP, nares clear, no sinus tenderness  Neck:  Supple, no JVD, no thyromegaly  Lymph: no axillary, cervical, supraclavicular lymphadenopathy noted bilaterally  Chest:  good aeration, bilateral diminished breath sounds, no wheezing, crackles or rhonchi, resonant to percussion b/l  CV: RRR, no " MGR, pulses 2+, equal.  Abd:  Soft, NT, ND, + BS, no HSM  EXT:  no clubbing, no cyanosis, No BLE edema  Neuro:  A&Ox3, CN grossly intact, no focal deficits.  Skin: No rashes or lesions noted     Result Review :   The following data was reviewed by: Hosea Garcia MD on 12/15/2021:                Data reviewed: Radiologic studies Previous CT scan of the chest from November 2020 was reviewed.  Shows improving pleural effusion, stable lung nodule.            Assessment and Plan    Diagnoses and all orders for this visit:    1. Allergic rhinitis, unspecified seasonality, unspecified trigger (Primary)  -     albuterol sulfate  (90 Base) MCG/ACT inhaler; Inhale 2 puffs Every 4 (Four) Hours As Needed for Wheezing.  Dispense: 1 g; Refill: 11  -     montelukast (SINGULAIR) 10 MG tablet; Take 1 tablet by mouth every night at bedtime.  Dispense: 90 tablet; Refill: 4  -     Cetirizine HCl 10 MG capsule; Take 1 capsule by mouth Daily.  Dispense: 30 capsule; Refill: 11  -     fluticasone (FLOVENT HFA) 220 MCG/ACT inhaler; Inhale 2 puffs 2 (Two) Times a Day.  Dispense: 12 g; Refill: 11  -      CT Chest Low Dose Cancer Screening WO; Future    2. Gastroesophageal reflux disease with esophagitis without hemorrhage  -     albuterol sulfate  (90 Base) MCG/ACT inhaler; Inhale 2 puffs Every 4 (Four) Hours As Needed for Wheezing.  Dispense: 1 g; Refill: 11  -     montelukast (SINGULAIR) 10 MG tablet; Take 1 tablet by mouth every night at bedtime.  Dispense: 90 tablet; Refill: 4  -     Cetirizine HCl 10 MG capsule; Take 1 capsule by mouth Daily.  Dispense: 30 capsule; Refill: 11  -     fluticasone (FLOVENT HFA) 220 MCG/ACT inhaler; Inhale 2 puffs 2 (Two) Times a Day.  Dispense: 12 g; Refill: 11  -      CT Chest Low Dose Cancer Screening WO; Future    3. Pulmonary emphysema, unspecified emphysema type (HCC)  -     albuterol sulfate  (90 Base) MCG/ACT inhaler; Inhale 2 puffs Every 4 (Four) Hours As Needed for Wheezing.   Dispense: 1 g; Refill: 11  -     montelukast (SINGULAIR) 10 MG tablet; Take 1 tablet by mouth every night at bedtime.  Dispense: 90 tablet; Refill: 4  -     Cetirizine HCl 10 MG capsule; Take 1 capsule by mouth Daily.  Dispense: 30 capsule; Refill: 11  -     fluticasone (FLOVENT HFA) 220 MCG/ACT inhaler; Inhale 2 puffs 2 (Two) Times a Day.  Dispense: 12 g; Refill: 11  -      CT Chest Low Dose Cancer Screening WO; Future    4. History of vaccination    5. SAMI (obstructive sleep apnea)    6. Smoking greater than 40 pack years  -      CT Chest Low Dose Cancer Screening WO; Future      Lung nodule with stable eccentric calcification.  It was benign.  She is due for low-dose lung cancer screening CT scan of the chest.  I have ordered it today.  Pleural effusion was stable and she was worried whether it is causing her worsening shortness of breath.  CT scan of the chest will show that.  On physical exam, does not sound diminished on the right side.    COPD: Continue with his Stiolto inhaler once daily..  Pulmonary function test showed some reversibility.  I will start her on Flovent 2 puffs twice daily.  Use albuterol as needed.    Obstructive sleep apnea: Continue with CPAP.  She is compliant and and and with it.  Weight loss counseling was done.  She has gained 15 pounds since her last office visit.    She is due for Covid booster shot.  She is up-to-date on flu and pneumonia vaccine.    Continue with Singulair and Zyrtec.    If her worsening shortness of breath is not improved on current therapies, may need repeat pulmonary function test on next office visit.      Follow Up   Return in about 6 months (around 6/15/2022).  Patient was given instructions and counseling regarding her condition or for health maintenance advice. Please see specific information pulled into the AVS if appropriate.       Electronically signed by Hosea Garcia MD, 12/15/2021, 08:58 EST.

## 2021-12-17 ENCOUNTER — TELEPHONE (OUTPATIENT)
Dept: PULMONOLOGY | Facility: CLINIC | Age: 61
End: 2021-12-17

## 2021-12-17 NOTE — TELEPHONE ENCOUNTER
pt called and said her stialto was too expensive and wanted to see how to get it cheaper, can we get it from cover my meds or the [facility], please call pt back and let her know what we can do

## 2021-12-22 ENCOUNTER — HOSPITAL ENCOUNTER (OUTPATIENT)
Dept: CT IMAGING | Facility: HOSPITAL | Age: 61
Discharge: HOME | End: 2021-12-22
Admitting: INTERNAL MEDICINE

## 2021-12-22 DIAGNOSIS — K21.00 GASTROESOPHAGEAL REFLUX DISEASE WITH ESOPHAGITIS WITHOUT HEMORRHAGE: ICD-10-CM

## 2021-12-22 DIAGNOSIS — J30.9 ALLERGIC RHINITIS, UNSPECIFIED SEASONALITY, UNSPECIFIED TRIGGER: ICD-10-CM

## 2021-12-22 DIAGNOSIS — F17.210 SMOKING GREATER THAN 40 PACK YEARS: ICD-10-CM

## 2021-12-22 DIAGNOSIS — J43.9 PULMONARY EMPHYSEMA, UNSPECIFIED EMPHYSEMA TYPE (HCC): ICD-10-CM

## 2021-12-22 PROCEDURE — 71271 CT THORAX LUNG CANCER SCR C-: CPT

## 2021-12-29 ENCOUNTER — HOSPITAL ENCOUNTER (EMERGENCY)
Facility: HOSPITAL | Age: 61
Discharge: HOME | End: 2021-12-29
Attending: EMERGENCY MEDICINE | Admitting: EMERGENCY MEDICINE

## 2021-12-29 VITALS
SYSTOLIC BLOOD PRESSURE: 130 MMHG | WEIGHT: 262.57 LBS | RESPIRATION RATE: 16 BRPM | TEMPERATURE: 98.1 F | DIASTOLIC BLOOD PRESSURE: 48 MMHG | OXYGEN SATURATION: 95 % | HEART RATE: 68 BPM | BODY MASS INDEX: 44.83 KG/M2 | HEIGHT: 64 IN

## 2021-12-29 DIAGNOSIS — R19.8 UMBILICAL BLEEDING: Primary | ICD-10-CM

## 2021-12-29 PROCEDURE — 99282 EMERGENCY DEPT VISIT SF MDM: CPT

## 2021-12-30 ENCOUNTER — APPOINTMENT (OUTPATIENT)
Dept: CT IMAGING | Facility: HOSPITAL | Age: 61
End: 2021-12-30

## 2022-01-11 RX ORDER — TIOTROPIUM BROMIDE AND OLODATEROL 3.124; 2.736 UG/1; UG/1
2 SPRAY, METERED RESPIRATORY (INHALATION)
Qty: 2 EACH | Refills: 0 | COMMUNITY
Start: 2022-01-11

## 2022-05-05 ENCOUNTER — TRANSCRIBE ORDERS (OUTPATIENT)
Dept: ADMINISTRATIVE | Facility: HOSPITAL | Age: 62
End: 2022-05-05

## 2022-05-05 DIAGNOSIS — R10.33 UMBILICAL PAIN: Primary | ICD-10-CM

## 2022-05-23 ENCOUNTER — OFFICE VISIT (OUTPATIENT)
Dept: SLEEP MEDICINE | Facility: HOSPITAL | Age: 62
End: 2022-05-23

## 2022-05-23 VITALS
HEART RATE: 72 BPM | SYSTOLIC BLOOD PRESSURE: 168 MMHG | HEIGHT: 64 IN | OXYGEN SATURATION: 94 % | WEIGHT: 276 LBS | DIASTOLIC BLOOD PRESSURE: 74 MMHG | BODY MASS INDEX: 47.12 KG/M2

## 2022-05-23 DIAGNOSIS — G47.21 CIRCADIAN RHYTHM SLEEP DISORDER, DELAYED SLEEP PHASE TYPE: ICD-10-CM

## 2022-05-23 DIAGNOSIS — G47.33 OSA (OBSTRUCTIVE SLEEP APNEA): Primary | ICD-10-CM

## 2022-05-23 DIAGNOSIS — E66.01 CLASS 3 SEVERE OBESITY DUE TO EXCESS CALORIES WITHOUT SERIOUS COMORBIDITY WITH BODY MASS INDEX (BMI) OF 40.0 TO 44.9 IN ADULT: ICD-10-CM

## 2022-05-23 PROBLEM — E66.813 CLASS 3 SEVERE OBESITY DUE TO EXCESS CALORIES WITHOUT SERIOUS COMORBIDITY WITH BODY MASS INDEX (BMI) OF 40.0 TO 44.9 IN ADULT: Status: ACTIVE | Noted: 2022-05-23

## 2022-05-23 PROBLEM — Z99.89 OSA ON CPAP: Status: ACTIVE | Noted: 2021-12-15

## 2022-05-23 PROCEDURE — G0463 HOSPITAL OUTPT CLINIC VISIT: HCPCS | Performed by: INTERNAL MEDICINE

## 2022-05-23 PROCEDURE — 99213 OFFICE O/P EST LOW 20 MIN: CPT | Performed by: INTERNAL MEDICINE

## 2022-05-23 NOTE — PROGRESS NOTES
"  [address] [address]  [place]   [place] [address]  Phone: [phone]  Fax: [phone]      SLEEP CLINIC FOLLOW UP PROGRESS NOTE.    Chiara Zee  [id number]   1960  61 y.o.  female      PCP: Jaleesa Piper APRN      Date of visit: 5/23/2022    Chief Complaint   Patient presents with   • Sleep Apnea   • Obesity       HPI:  This is a 61 y.o. years old patient is here for the management of obstructive sleep apnea.  Sleep apnea is moderate in severity with a AHI of moderate/hr. Patient is using positive airway pressure therapy with 28 and the symptoms of snoring, non-restorative sleep and daytime excessive sleepiness have improved significantly on the therapy. Normally goes to bed at 4 AM and wakes up at 12 PM.  The patient wakes up 3 time(s) during the night and has no problem going back to sleep.  Feels refreshed after waking up.  Patient also denies headaches and nasal congestion.     She is retired now she seems to have a delayed sleep phase syndrome.    Medications and allergies are reviewed by me and documented in the encounter.     SOCIAL (habits pertaining to sleep medicine)  History tobacco use:No   History of alcohol use: 0 per week  Caffeine use: 2     REVIEW OF SYSTEMS:   [place] Sleepiness Scale :Total score: 11   Nasal congestion:No   Dry mouth/nose:No   Post nasal drip; Yes   Acid reflux/Heartburn:No   Abd bloating:No   Morning headache:No   Anxiety:Yes   Depression:Yes    PHYSICAL EXAMINATION:  CONSTITUTIONAL:  Vitals:    05/23/22 1300   BP: 168/74   Pulse: 72   SpO2: 94%   Weight: 125 kg (276 lb)   Height: 162.6 cm (64\")    Body mass index is 47.38 kg/m².   NOSE: nasal passages are clear, No deformities noted   RESP SYSTEM: Not in any respiratory distress, no chest deformities noted,   CARDIOVASULAR: No edema noted  NEURO: Oriented x 3, gait normal,  Mood and affect appeared appropriate      Data reviewed:  The Smart card downloaded on 5/23/2022 " has been reviewed independently by me for compliance and discussed the data with the patient.   Compliance; 9790%  More than 4 hr use, 97%  Average use of the device 8 hours and 51 minutes per night  Residual AHI: 0.9 /hr (goal < 5.0 /hr)  Mask type: Nasal mask  Device: [employer]  DME: [employer]      ASSESSMENT AND PLAN:  · Obstructive sleep apnea ( G 47.33).  The symptoms of sleep apnea have improved with the device and the treatment.  Patient's compliance with the device is excellent for treatment of sleep apnea.  I have independently reviewed the smart card down load and discussed with the patient the download data and encouarged the patient to continue to use the device.The residual AHI is acceptable. The device is benefiting the patient and the device is medically necessary.  Without proper control of sleep apnea and good compliance there is a increased risk for hypertension, diabetes mellitus and nonrestorative sleep with hypersomnia which can increase risk for motor vehicle accidents.  Untreated sleep apnea is also a risk factor for development of atrial fibrillation, pulmonary hypertension and stroke. The patient is also instructed to get the supplies from the [employer] and and change them on a regular basis.  A prescription for supplies has been sent to the [facility].  I have also discussed the good sleep hygiene habits and adequate amount of sleep needed for good health.  · Obesity morbid with BMI is Body mass index is 47.38 kg/m².. I have discuss the relationship between the weight and sleep apnea. The benefit of weight loss in reducing severity of sleep apnea was discussed. Discussed diet and exercise with the patient to achieve ideal BMI.  · Delayed sleep phase disorder (G47.21).  The patient exhibited delayed sleep to conventional bedtime and has difficulty waking up in the morning. This type of circadian rhythm problem is seen most often in children and adolescents. This is due to shift in the  circadian clock. I have talked with him patient about good sleep habits, maintaining a fixed sleep time and wake time.  In addition need to avoid caffeinated products, alcohol, nicotine and sleeping pills.  In addition patient should avoid activities before bedtime which are stimulating like working on the computer, using smart phone and watching television. Return in about 1 year (around 5/23/2023) for Annual visit with smartcard download. . Patient's questions were answered.      Noa Sparks MD  Sleep Medicine.  Medical Director, [facility]  5/23/2022 ,

## 2022-05-26 ENCOUNTER — HOSPITAL ENCOUNTER (OUTPATIENT)
Dept: CT IMAGING | Facility: HOSPITAL | Age: 62
Discharge: HOME | End: 2022-05-26
Admitting: NURSE PRACTITIONER

## 2022-05-26 DIAGNOSIS — R10.33 UMBILICAL PAIN: ICD-10-CM

## 2022-05-26 LAB
CREAT BLDA-MCNC: 0.7 MG/DL
EGFRCR SERPLBLD CKD-EPI 2021: 97.9 ML/MIN/1.73

## 2022-05-26 PROCEDURE — 0 IOPAMIDOL PER 1 ML: Performed by: NURSE PRACTITIONER

## 2022-05-26 PROCEDURE — 82565 ASSAY OF CREATININE: CPT

## 2022-05-26 PROCEDURE — 74177 CT ABD & PELVIS W/CONTRAST: CPT

## 2022-05-26 RX ADMIN — IOPAMIDOL 100 ML: 755 INJECTION, SOLUTION INTRAVENOUS at 12:57

## 2022-06-09 ENCOUNTER — TELEPHONE (OUTPATIENT)
Dept: PULMONOLOGY | Facility: CLINIC | Age: 62
End: 2022-06-09

## 2022-06-14 ENCOUNTER — OFFICE VISIT (OUTPATIENT)
Dept: PULMONOLOGY | Facility: CLINIC | Age: 62
End: 2022-06-14

## 2022-06-14 VITALS
SYSTOLIC BLOOD PRESSURE: 110 MMHG | HEART RATE: 61 BPM | OXYGEN SATURATION: 94 % | WEIGHT: 276 LBS | DIASTOLIC BLOOD PRESSURE: 59 MMHG | TEMPERATURE: 98.9 F | BODY MASS INDEX: 45.98 KG/M2 | HEIGHT: 65 IN | RESPIRATION RATE: 16 BRPM

## 2022-06-14 DIAGNOSIS — R07.89 CHEST PAIN, ATYPICAL: ICD-10-CM

## 2022-06-14 DIAGNOSIS — J43.9 PULMONARY EMPHYSEMA, UNSPECIFIED EMPHYSEMA TYPE: Primary | ICD-10-CM

## 2022-06-14 DIAGNOSIS — R91.1 LUNG NODULE: ICD-10-CM

## 2022-06-14 DIAGNOSIS — G47.33 OSA (OBSTRUCTIVE SLEEP APNEA): ICD-10-CM

## 2022-06-14 DIAGNOSIS — F17.210 SMOKING GREATER THAN 40 PACK YEARS: ICD-10-CM

## 2022-06-14 PROCEDURE — 99213 OFFICE O/P EST LOW 20 MIN: CPT | Performed by: NURSE PRACTITIONER

## 2022-06-14 RX ORDER — MELOXICAM 15 MG/1
1 TABLET ORAL DAILY
COMMUNITY
Start: 2022-06-10 | End: 2022-06-14 | Stop reason: SDUPTHER

## 2022-06-14 RX ORDER — CETIRIZINE HYDROCHLORIDE 10 MG/1
1 TABLET ORAL DAILY
COMMUNITY
Start: 2022-04-11 | End: 2022-06-14 | Stop reason: SDUPTHER

## 2022-06-14 RX ORDER — HYDROXYZINE HYDROCHLORIDE 25 MG/1
1 TABLET, FILM COATED ORAL EVERY 8 HOURS PRN
COMMUNITY
Start: 2022-04-28 | End: 2022-07-26

## 2022-06-14 RX ORDER — MONTELUKAST SODIUM 10 MG/1
10 TABLET ORAL DAILY
COMMUNITY
Start: 2022-04-28 | End: 2022-06-14 | Stop reason: SDUPTHER

## 2022-06-14 NOTE — PROGRESS NOTES
"Primary Care Provider  Jaleesa Piper APRN   Referring Provider  No ref. provider found    Patient Complaint  Allergic Rhinitis, Follow-up (6 Month Follow Up ), Shortness of Breath (All of the time /), and Chest Pain (On left side. \"Stabbing\" \"mainly just an annoying ache than severe pain\" )      SUBJECTIVE    History of Presenting Illness  Chiara Zee is a pleasant 61 y.o. female who presents to [facility] for next month follow-up.  Patient presents with no complaints today states she is doing very well overall.  She has not been on any steroids or antibiotics for her lungs in the past 6 months.  She is a former smoker quit in 2016 1 pack/day 45-pack-year history she is up-to-date on her vaccinations.  She does have obstructive sleep apnea and is on a CPAP machine.  She is under the care of Dr. Sparks.  Her [employer] company is [employer].  Patient states she is having some intermittent left anterior chest wall discomfort.  Patient states she does have an umbilical hernia and is waiting to have repair by surgeon.  She also suffers from reflux.  She is not sure if the pain that she is having is related to the hernia or the reflux.  She is currently not on any medicine for her reflux.  At this time patient states she does not have any dyspnea, coughing, wheezing, headaches, chest pain, weight loss or hemoptysis. Denies fevers, chills and night sweats. Chiara Zee is able to perform ADLs without difficulties and denies any swollen glands/lymph nodes in the head or neck.    I have personally reviewed the review of systems, past family, social, medical and surgical histories; and agree with their findings.    Review of Systems   Constitutional: Negative.    HENT: Negative.    Eyes: Negative.    Respiratory: Positive for chest tightness. Negative for cough, shortness of breath and wheezing.    Cardiovascular: Positive for chest pain. Negative for " palpitations and leg swelling.   Musculoskeletal: Negative.    Skin: Negative.         Family History   Problem Relation Age of Onset   • Diabetes Sister    • Diabetes Brother         Social History     Socioeconomic History   • Marital status:    Tobacco Use   • Smoking status: Former Smoker     Packs/day: 1.00     Years: 45.00     Pack years: 45.00     Types: Cigarettes     Quit date:      Years since quittin.4   • Smokeless tobacco: Never Used   Vaping Use   • Vaping Use: Never used   Substance and Sexual Activity   • Alcohol use: Never   • Drug use: Never   • Sexual activity: Defer        Past Medical History:   Diagnosis Date   • COPD (chronic obstructive pulmonary disease) ([facility])    • Hypertension         Immunization History   Administered Date(s) Administered   • COVID-19 (PFIZER) PURPLE CAP 2021, 2021, 2022   • Flu Vaccine Quad PF >36MO 10/28/2020, 10/28/2021   • Fluzone High Dose =>65 Years ([employer] ONLY) 2021   • Pneumococcal Polysaccharide (PPSV23) 2020   • Shingrix 2021, 2021   • Tdap 2020       No Known Allergies       Current Outpatient Medications:   •  albuterol sulfate  (90 Base) MCG/ACT inhaler, Inhale 2 puffs Every 4 (Four) Hours As Needed for Wheezing., Disp: 1 g, Rfl: 11  •  atorvastatin (LIPITOR) 20 MG tablet, Take 20 mg by mouth Daily., Disp: , Rfl:   •  busPIRone (BUSPAR) 10 MG tablet, Take 10 mg by mouth 3 (Three) Times a Day., Disp: , Rfl:   •  Cetirizine HCl 10 MG capsule, Take 1 capsule by mouth Daily., Disp: 30 capsule, Rfl: 11  •  loratadine (CLARITIN) 10 MG tablet, loratadine 10 mg oral tablet take 1 tablet (10 mg) by oral route once daily   Active, Disp: , Rfl:   •  meloxicam (MOBIC) 15 MG tablet, Take 15 mg by mouth Daily., Disp: , Rfl:   •  metoprolol tartrate (LOPRESSOR) 25 MG tablet, Take 25 mg by mouth Daily., Disp: , Rfl:   •  montelukast (SINGULAIR) 10 MG tablet, Take 1 tablet by mouth every night at  "bedtime., Disp: 90 tablet, Rfl: 4  •  pantoprazole (PROTONIX) 40 MG EC tablet, Take 40 mg by mouth Daily., Disp: , Rfl:   •  raNITIdine (ZANTAC) 150 MG capsule, ranitidine HCl 150 mg oral capsule take 1 capsule (150 mg) by oral route 2 times per day   Active, Disp: , Rfl:   •  sertraline (ZOLOFT) 50 MG tablet, Take 50 mg by mouth Daily., Disp: , Rfl:   •  tiotropium bromide-olodaterol (Stiolto Respimat) 2.5-2.5 MCG/ACT aerosol solution inhaler, Inhale 2 puffs Daily., Disp: 2 each, Rfl: 0  •  Xarelto 2.5 MG tablet, Take 2.5 mg by mouth 2 (Two) Times a Day With Meals., Disp: , Rfl:   •  Cholecalciferol 50 MCG (2000 UT) capsule, Vitamin D3 2,000 unit oral capsule take 1 capsule by oral route 2 times a day   Active, Disp: , Rfl:   •  diphenhydrAMINE (SOMINEX) 25 MG tablet, Allergy 25 mg oral tablet 10 mg qid   Active, Disp: , Rfl:   •  ergocalciferol (ERGOCALCIFEROL) 1.25 MG (29881 UT) capsule, Take 2,000 Units by mouth Daily., Disp: , Rfl:   •  fluticasone (FLOVENT HFA) 220 MCG/ACT inhaler, Inhale 2 puffs 2 (Two) Times a Day., Disp: 12 g, Rfl: 11  •  HYDROcodone-acetaminophen (NORCO) 5-325 MG per tablet, , Disp: , Rfl:   •  hydrOXYzine (ATARAX) 25 MG tablet, Take 1 tablet by mouth Every 8 (Eight) Hours As Needed., Disp: , Rfl:   •  Inulin 2 g chewable tablet, , Disp: , Rfl:   •  metoprolol succinate XL (TOPROL-XL) 25 MG 24 hr tablet, , Disp: , Rfl:        OBJECTIVE    Vital Signs   /59 (BP Location: Left arm, Patient Position: Sitting, Cuff Size: Large Adult)   Pulse 61   Temp 98.9 °F (37.2 °C) (Tympanic)   Resp 16   Ht 165.1 cm (65\")   Wt 125 kg (276 lb)   SpO2 94% Comment: Room air  BMI 45.93 kg/m²     Physical Exam  Vitals reviewed.   Constitutional:       Appearance: Normal appearance. She is obese.   HENT:      Head: Normocephalic and atraumatic.      Nose: Nose normal.      Mouth/Throat:      Mouth: Mucous membranes are moist.      Pharynx: Oropharynx is clear.   Eyes:      Extraocular Movements: " Extraocular movements intact.      Conjunctiva/sclera: Conjunctivae normal.      Pupils: Pupils are equal, round, and reactive to light.   Cardiovascular:      Rate and Rhythm: Normal rate and regular rhythm.      Pulses: Normal pulses.      Heart sounds: Normal heart sounds.   Pulmonary:      Effort: Pulmonary effort is normal. No respiratory distress.      Breath sounds: Normal breath sounds. No wheezing, rhonchi or rales.   Abdominal:      General: Bowel sounds are normal.   Musculoskeletal:         General: Normal range of motion.      Cervical back: Normal range of motion and neck supple.   Skin:     General: Skin is warm and dry.   Neurological:      General: No focal deficit present.      Mental Status: She is alert and oriented to person, place, and time.   Psychiatric:         Mood and Affect: Mood normal.         Behavior: Behavior normal.          Results Review  I have personally reviewed the prior office note of Dr. Garcia 12/15/2021, CT low-dose 12/22/2021.      ASSESSMENT & PLAN    Patient Active Problem List   Diagnosis   • Allergic rhinitis   • Pulmonary emphysema (HCC)   • Gastroesophageal reflux disease   • Essential hypertension   • History of vaccination   • Hyperlipidemia   • SAMI (obstructive sleep apnea)   • Smoking greater than 40 pack years   • Lung nodule   • Class 3 severe obesity due to excess calories without serious comorbidity with body mass index (BMI) of 40.0 to 44.9 in adult (HCC)   • Circadian rhythm sleep disorder, delayed sleep phase type       Diagnoses and all orders for this visit:    1. Pulmonary emphysema, unspecified emphysema type (HCC) (Primary)    2. Lung nodule  -     Cancel:  CT Chest Low Dose Cancer Screening WO; Future  -      CT Chest Low Dose Cancer Screening WO; Future    3. SAMI (obstructive sleep apnea)    4. Smoking greater than 40 pack years  -     Cancel:  CT Chest Low Dose Cancer Screening WO; Future  -      CT Chest Low Dose Cancer Screening WO; Future    5.  Chest pain, atypical  -     Ambulatory Referral to Cardiology  -     ECG 12 Lead; Future           Plan:  At this time will order EKG for patient.  Make referral back to Dr. Kei Dennis who is her cardiologist that she and him in several years.  Instructed should she develop chest discomfort she should call [phone] go to emergency room.  Patient continue with Stiolto Singulair and albuterol at this time she is on Eliquis as well.  We will also plan to get a low-dose lung cancer screening in December 2022 with a follow-up appointment afterwards to review the results.  Patient to continue with her sleep med Dr. Sparks for her obstructive sleep apnea and CPAP machine monitoring/surveillance.    Smoking status: Former  Vaccination status: Up-to-date  Medications personally reviewed    Follow Up  Return in about 6 months (around 12/14/2022).    Patient was given instructions and counseling regarding her condition or for health maintenance advice. Please see specific information pulled into the AVS if appropriate.

## 2022-06-16 ENCOUNTER — OFFICE VISIT (OUTPATIENT)
Dept: UROLOGY | Facility: CLINIC | Age: 62
End: 2022-06-16

## 2022-06-16 VITALS — WEIGHT: 276 LBS | BODY MASS INDEX: 45.98 KG/M2 | HEIGHT: 65 IN | RESPIRATION RATE: 16 BRPM

## 2022-06-16 DIAGNOSIS — N39.41 URGE INCONTINENCE OF URINE: Primary | ICD-10-CM

## 2022-06-16 PROBLEM — L30.8 PRURITIC DERMATITIS: Status: ACTIVE | Noted: 2022-04-28

## 2022-06-16 PROBLEM — I21.9 HEART ATTACK: Status: ACTIVE | Noted: 2022-06-16

## 2022-06-16 PROBLEM — K62.5 RECTAL BLEEDING: Status: ACTIVE | Noted: 2022-06-16

## 2022-06-16 PROBLEM — Z87.891 HISTORY OF TOBACCO ABUSE: Status: ACTIVE | Noted: 2022-06-16

## 2022-06-16 PROBLEM — J30.2 SEASONAL ALLERGIES: Status: ACTIVE | Noted: 2022-06-16

## 2022-06-16 PROBLEM — G47.33 OBSTRUCTIVE SLEEP APNEA SYNDROME: Status: ACTIVE | Noted: 2021-05-21

## 2022-06-16 PROBLEM — J90 CHRONIC PLEURAL EFFUSION: Status: ACTIVE | Noted: 2019-01-01

## 2022-06-16 PROBLEM — Z86.73 PERSONAL HISTORY OF TRANSIENT ISCHEMIC ATTACK (TIA), AND CEREBRAL INFARCTION WITHOUT RESIDUAL DEFICITS: Status: ACTIVE | Noted: 2017-01-01

## 2022-06-16 PROBLEM — I25.10 ARTERIOSCLEROSIS OF CORONARY ARTERY: Status: ACTIVE | Noted: 2022-06-16

## 2022-06-16 PROBLEM — R01.1 HEART MURMUR: Status: ACTIVE | Noted: 2021-05-21

## 2022-06-16 PROBLEM — M79.89 LIMB SWELLING: Status: ACTIVE | Noted: 2022-06-16

## 2022-06-16 PROBLEM — F32.A DEPRESSIVE DISORDER: Status: ACTIVE | Noted: 2021-05-21

## 2022-06-16 PROBLEM — M19.90 ARTHRITIS: Status: ACTIVE | Noted: 2022-06-16

## 2022-06-16 PROBLEM — I21.9 MYOCARDIAL INFARCTION (HCC): Status: ACTIVE | Noted: 2017-01-01

## 2022-06-16 PROBLEM — F41.9 ANXIETY: Status: ACTIVE | Noted: 2021-05-21

## 2022-06-16 PROBLEM — G56.00 CARPAL TUNNEL SYNDROME: Status: ACTIVE | Noted: 2022-06-16

## 2022-06-16 PROBLEM — L29.9 PRURITIC DERMATITIS: Status: ACTIVE | Noted: 2022-04-28

## 2022-06-16 LAB — URINE VOLUME: 0

## 2022-06-16 PROCEDURE — 99212 OFFICE O/P EST SF 10 MIN: CPT | Performed by: NURSE PRACTITIONER

## 2022-06-16 PROCEDURE — 51798 US URINE CAPACITY MEASURE: CPT | Performed by: NURSE PRACTITIONER

## 2022-06-16 NOTE — PROGRESS NOTES
Chief Complaint: Urinary Incontinence (NIGHT AND MORNINGS have the urge to go)    Subjective         History of Present Illness  Chiara Zee is a 61 y.o. female presents to [facility] to be seen for Urinary incontinence.    The patient states that she will not have the urge to go and will stand up then will have full bladder leakage and at times will get the urge to go but cannot make it.    She drinks coffee 20 oz and pepsi zero x2 daily even during the night.     She has nocturia x 1 rarely 2x     She voids every hour during the day.    She does have leakage with cough laugh and sneeze, but not as bothersome.    She has not had  Hysterectomy.    Objective     Past Medical History:   Diagnosis Date   • COPD (chronic obstructive pulmonary disease) (HCC)    • Hypertension        Past Surgical History:   Procedure Laterality Date   •  SECTION     • OTHER SURGICAL HISTORY      right wrist fracture, plate and screws         Current Outpatient Medications:   •  albuterol sulfate  (90 Base) MCG/ACT inhaler, Inhale 2 puffs Every 4 (Four) Hours As Needed for Wheezing., Disp: 1 g, Rfl: 11  •  atorvastatin (LIPITOR) 20 MG tablet, Take 20 mg by mouth Daily., Disp: , Rfl:   •  busPIRone (BUSPAR) 10 MG tablet, Take 10 mg by mouth 3 (Three) Times a Day., Disp: , Rfl:   •  Cetirizine HCl 10 MG capsule, Take 1 capsule by mouth Daily., Disp: 30 capsule, Rfl: 11  •  Cholecalciferol 50 MCG (2000 UT) capsule, Vitamin D3 2,000 unit oral capsule take 1 capsule by oral route 2 times a day   Active, Disp: , Rfl:   •  diphenhydrAMINE (SOMINEX) 25 MG tablet, Allergy 25 mg oral tablet 10 mg qid   Active, Disp: , Rfl:   •  ergocalciferol (ERGOCALCIFEROL) 1.25 MG (46609 UT) capsule, Take 2,000 Units by mouth Daily., Disp: , Rfl:   •  fluticasone (FLOVENT HFA) 220 MCG/ACT inhaler, Inhale 2 puffs 2 (Two) Times a Day., Disp: 12 g, Rfl: 11  •  HYDROcodone-acetaminophen (NORCO) 5-325 MG per  "tablet, , Disp: , Rfl:   •  hydrOXYzine (ATARAX) 25 MG tablet, Take 1 tablet by mouth Every 8 (Eight) Hours As Needed., Disp: , Rfl:   •  Inulin 2 g chewable tablet, , Disp: , Rfl:   •  loratadine (CLARITIN) 10 MG tablet, loratadine 10 mg oral tablet take 1 tablet (10 mg) by oral route once daily   Active, Disp: , Rfl:   •  meloxicam (MOBIC) 15 MG tablet, Take 15 mg by mouth Daily., Disp: , Rfl:   •  metoprolol succinate XL (TOPROL-XL) 25 MG 24 hr tablet, , Disp: , Rfl:   •  metoprolol tartrate (LOPRESSOR) 25 MG tablet, Take 25 mg by mouth Daily., Disp: , Rfl:   •  montelukast (SINGULAIR) 10 MG tablet, Take 1 tablet by mouth every night at bedtime., Disp: 90 tablet, Rfl: 4  •  pantoprazole (PROTONIX) 40 MG EC tablet, Take 40 mg by mouth Daily., Disp: , Rfl:   •  raNITIdine (ZANTAC) 150 MG capsule, ranitidine HCl 150 mg oral capsule take 1 capsule (150 mg) by oral route 2 times per day   Active, Disp: , Rfl:   •  sertraline (ZOLOFT) 50 MG tablet, Take 50 mg by mouth Daily., Disp: , Rfl:   •  tiotropium bromide-olodaterol (Stiolto Respimat) 2.5-2.5 MCG/ACT aerosol solution inhaler, Inhale 2 puffs Daily., Disp: 2 each, Rfl: 0  •  Xarelto 2.5 MG tablet, Take 2.5 mg by mouth 2 (Two) Times a Day With Meals., Disp: , Rfl:     No Known Allergies     Family History   Problem Relation Age of Onset   • Diabetes Sister    • Diabetes Brother        Social History     Socioeconomic History   • Marital status:    Tobacco Use   • Smoking status: Former Smoker     Packs/day: 1.00     Years: 45.00     Pack years: 45.00     Types: Cigarettes     Quit date:      Years since quittin.4   • Smokeless tobacco: Never Used   Vaping Use   • Vaping Use: Never used   Substance and Sexual Activity   • Alcohol use: Never   • Drug use: Never   • Sexual activity: Defer       Vital Signs:   Resp 16   Ht 165.1 cm (65\")   Wt 125 kg (276 lb)   BMI 45.93 kg/m²      Physical Exam  Genitourinary:     Comments: Exam limited by body " "habitus and severe atrophic changes. No blatant prolapse noted          Result Review :   The following data was reviewed by: DANI Rivas on 06/16/2022:  Results for orders placed or performed in visit on 06/16/22   Bladder Scan   Result Value Ref Range    Urine Volume 0        Bladder Scan interpretation 06/16/2022    Estimation of residual urine via BVI 3000 Verathon Bladder Scan  MA/nurse performing: Giovanna Dupont RN  Residual Urine: 0 ml  Indication: Urge incontinence of urine   Position: Supine  Examination: Incremental scanning of the suprapubic area using 2.0 MHz transducer using copious amounts of acoustic gel.   Findings: An anechoic area was demonstrated which represented the bladder, with measurement of residual urine as noted. I inspected this myself. In that the residual urine was insignificant, refer to plan for treatment and plan necessary at this time.           Procedures        Assessment and Plan    Diagnoses and all orders for this visit:    1. Urge incontinence of urine (Primary)  -     Bladder Scan    Discussed with patient at this point in time I believe her symptoms may be related to a lack of oral fluid hydration as well as consuming only bladder irritating substances throughout the day.    Mixed Urinary incontinence-discussed with patient at length, all questions addressed. Discussed with patient that mixed urinary incontinence is a common condition that is multifactorial in nature and frequently difficult to treat, unlikely to cure, and management is dictated by patient motivation to improve and cope with symptoms.                 Urge component- most bothersome to patient; will start behavioral modifications via timed and double voiding, fluid management, \"quick flicks\" with onset of urgency, not drinking right before bedtime, voiding prior to going to sleep, .               Stress component-   - Initiate conservative management.  -Patient desires weight loss and is planning to " "work on this. Discussed the importance of stable weight if and when surgical management is pursued  -Pelvic floor strengthening-encouraged patient to perform Kegel maneuvers  Kegel maneuver instruction provided-patient to squeeze the Kegel (pelvic floor) muscle by lifting \"in and up\" with the muscles around the vagina, just as if you were trying to lift and squeezing imaginary tampon.  You can use Kegel muscles before cough/left/sneeze to prevent leaking.  Performed 20 squeezes, practiced 3 times a day.  -Pelvic floor physical therapy consult, patient received contact information and is to call to schedule.     Patient was provided educational handouts on both SHIREEN, UUI, and bladder prolapse.  Patient wishes to work on a behavioral modification as well as weight loss and will call to arrange follow-up.           I spent  15 minutes caring for Chiara on this date of service. This time includes time spent by me in the following activities:reviewing tests, obtaining and/or reviewing a separately obtained history, performing a medically appropriate examination and/or evaluation , counseling and educating the patient/family/caregiver, ordering medications, tests, or procedures, and documenting information in the medical record  Follow Up   Return in about 6 weeks (around 7/28/2022) for f/u UUI .  Patient was given instructions and counseling regarding her condition or for health maintenance advice. Please see specific information pulled into the AVS if appropriate.         This document has been electronically signed by DANI Rivas  June 16, 2022 09:29 EDT       "

## 2022-06-22 ENCOUNTER — HOSPITAL ENCOUNTER (OUTPATIENT)
Dept: CT IMAGING | Facility: HOSPITAL | Age: 62
Discharge: HOME | End: 2022-06-22
Admitting: NURSE PRACTITIONER

## 2022-06-22 DIAGNOSIS — R91.1 LUNG NODULE: ICD-10-CM

## 2022-06-22 DIAGNOSIS — F17.210 SMOKING GREATER THAN 40 PACK YEARS: ICD-10-CM

## 2022-06-22 PROCEDURE — 71271 CT THORAX LUNG CANCER SCR C-: CPT

## 2022-06-23 NOTE — PROGRESS NOTES
Please let patient know her CT scan Low dose screening shows no suspicious pulmonary nodules, recommendation is to continue with annual LDCT.Thanks, Dali

## 2022-07-06 ENCOUNTER — PREP FOR SURGERY (OUTPATIENT)
Dept: OTHER | Facility: HOSPITAL | Age: 62
End: 2022-07-06

## 2022-07-06 ENCOUNTER — OFFICE VISIT (OUTPATIENT)
Dept: SURGERY | Facility: CLINIC | Age: 62
End: 2022-07-06

## 2022-07-06 VITALS — BODY MASS INDEX: 45.92 KG/M2 | WEIGHT: 275.6 LBS | RESPIRATION RATE: 16 BRPM | HEIGHT: 65 IN

## 2022-07-06 DIAGNOSIS — K42.9 UMBILICAL HERNIA WITHOUT OBSTRUCTION AND WITHOUT GANGRENE: Primary | ICD-10-CM

## 2022-07-06 PROCEDURE — 99203 OFFICE O/P NEW LOW 30 MIN: CPT | Performed by: SURGERY

## 2022-07-06 RX ORDER — SODIUM CHLORIDE 0.9 % (FLUSH) 0.9 %
10 SYRINGE (ML) INJECTION AS NEEDED
Status: CANCELLED | OUTPATIENT
Start: 2022-07-06

## 2022-07-06 RX ORDER — SODIUM CHLORIDE, SODIUM LACTATE, POTASSIUM CHLORIDE, CALCIUM CHLORIDE 600; 310; 30; 20 MG/100ML; MG/100ML; MG/100ML; MG/100ML
100 INJECTION, SOLUTION INTRAVENOUS CONTINUOUS
Status: CANCELLED | OUTPATIENT
Start: 2022-07-06

## 2022-07-06 RX ORDER — ONDANSETRON 2 MG/ML
4 INJECTION INTRAMUSCULAR; INTRAVENOUS EVERY 6 HOURS PRN
Status: CANCELLED | OUTPATIENT
Start: 2022-07-06

## 2022-07-06 RX ORDER — SODIUM CHLORIDE 9 MG/ML
40 INJECTION, SOLUTION INTRAVENOUS AS NEEDED
Status: CANCELLED | OUTPATIENT
Start: 2022-07-06

## 2022-07-06 RX ORDER — SODIUM CHLORIDE 0.9 % (FLUSH) 0.9 %
10 SYRINGE (ML) INJECTION EVERY 12 HOURS SCHEDULED
Status: CANCELLED | OUTPATIENT
Start: 2022-07-06

## 2022-07-06 RX ORDER — CLINDAMYCIN PHOSPHATE 900 MG/50ML
900 INJECTION INTRAVENOUS ONCE
Status: CANCELLED | OUTPATIENT
Start: 2022-07-06 | End: 2022-07-06

## 2022-07-06 RX ORDER — SULFAMETHOXAZOLE AND TRIMETHOPRIM 800; 160 MG/1; MG/1
1 TABLET ORAL 2 TIMES DAILY
Qty: 14 TABLET | Refills: 0 | Status: SHIPPED | OUTPATIENT
Start: 2022-07-06 | End: 2022-07-25 | Stop reason: SDUPTHER

## 2022-07-06 NOTE — PROGRESS NOTES
Chief Complaint: Hernia (Umbilical )    Subjective         History of Present Illness  Chiara Zee is a 62 y.o. female presents to [facility]. The patient is to be seen for an umbilical hernia.    She complains of an umbilical hernia that has been bleeding intermittently for about 11 months. She states that approximately 9 months ago, the skin over her umbilicus was bleeding 2 to 3 times per week and now the bleeding has been occurring daily for the last 1 to 1.5 weeks with dark red blood that has soaked through her pajamas on occasion. The patient reports going to the emergency room due to the stretching pain she was experiencing and was advised they did not find anything wrong. She then returned to her PCP who ordered a CT scan that revealed a small umbilical hernia. At this time, she denies having been prescribed any kind of antibiotics.     The patient reports that she quit smoking 6 years ago, has a history of , and is on Xarelto for a history of myocardial infarction. 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 At this time, she has been on Xarelto for approximately 2 to 3 years and reports her cardiologist to be Dr. Anne Marquis.    Objective     Past Medical History:   Diagnosis Date   • COPD (chronic obstructive pulmonary disease) (HCC)    • Hypertension        Past Surgical History:   Procedure Laterality Date   •  SECTION     • OTHER SURGICAL HISTORY      right wrist fracture, plate and screws         Current Outpatient Medications:   •  albuterol sulfate  (90 Base) MCG/ACT inhaler, Inhale 2 puffs Every 4 (Four) Hours As Needed for Wheezing., Disp: 1 g, Rfl: 11  •  atorvastatin (LIPITOR) 20 MG tablet, Take 20 mg by mouth Daily., Disp: , Rfl:   •  busPIRone (BUSPAR) 10 MG tablet, Take 10 mg by mouth 3 (Three) Times a Day.,  Disp: , Rfl:   •  Cholecalciferol 50 MCG (2000 UT) capsule, Vitamin D3 2,000 unit oral capsule take 1 capsule by oral route 2 times a day   Active, Disp: , Rfl:   •  diphenhydrAMINE (SOMINEX) 25 MG tablet, Allergy 25 mg oral tablet 10 mg qid   Active, Disp: , Rfl:   •  ergocalciferol (ERGOCALCIFEROL) 1.25 MG (11463 UT) capsule, Take 2,000 Units by mouth Daily., Disp: , Rfl:   •  fluticasone (FLOVENT HFA) 220 MCG/ACT inhaler, Inhale 2 puffs 2 (Two) Times a Day., Disp: 12 g, Rfl: 11  •  HYDROcodone-acetaminophen (NORCO) 5-325 MG per tablet, , Disp: , Rfl:   •  hydrOXYzine (ATARAX) 25 MG tablet, Take 1 tablet by mouth Every 8 (Eight) Hours As Needed., Disp: , Rfl:   •  Inulin 2 g chewable tablet, , Disp: , Rfl:   •  loratadine (CLARITIN) 10 MG tablet, loratadine 10 mg oral tablet take 1 tablet (10 mg) by oral route once daily   Active, Disp: , Rfl:   •  meloxicam (MOBIC) 15 MG tablet, Take 15 mg by mouth Daily., Disp: , Rfl:   •  metoprolol succinate XL (TOPROL-XL) 25 MG 24 hr tablet, , Disp: , Rfl:   •  metoprolol tartrate (LOPRESSOR) 25 MG tablet, Take 25 mg by mouth Daily., Disp: , Rfl:   •  montelukast (SINGULAIR) 10 MG tablet, Take 1 tablet by mouth every night at bedtime., Disp: 90 tablet, Rfl: 4  •  pantoprazole (PROTONIX) 40 MG EC tablet, Take 40 mg by mouth Daily., Disp: , Rfl:   •  raNITIdine (ZANTAC) 150 MG capsule, ranitidine HCl 150 mg oral capsule take 1 capsule (150 mg) by oral route 2 times per day   Active, Disp: , Rfl:   •  sertraline (ZOLOFT) 50 MG tablet, Take 50 mg by mouth Daily., Disp: , Rfl:   •  tiotropium bromide-olodaterol (Stiolto Respimat) 2.5-2.5 MCG/ACT aerosol solution inhaler, Inhale 2 puffs Daily., Disp: 2 each, Rfl: 0  •  Xarelto 2.5 MG tablet, Take 2.5 mg by mouth 2 (Two) Times a Day With Meals., Disp: , Rfl:   •  Cetirizine HCl 10 MG capsule, Take 1 capsule by mouth Daily., Disp: 30 capsule, Rfl: 11  •  sulfamethoxazole-trimethoprim (Bactrim DS) 800-160 MG per tablet, Take 1 tablet  "by mouth 2 (Two) Times a Day., Disp: 14 tablet, Rfl: 0    No Known Allergies     Family History   Problem Relation Age of Onset   • Diabetes Sister    • Diabetes Brother        Social History     Socioeconomic History   • Marital status:    Tobacco Use   • Smoking status: Former Smoker     Packs/day: 1.00     Years: 45.00     Pack years: 45.00     Types: Cigarettes     Quit date:      Years since quittin.5   • Smokeless tobacco: Never Used   Vaping Use   • Vaping Use: Never used   Substance and Sexual Activity   • Alcohol use: Never   • Drug use: Never   • Sexual activity: Defer       Vital Signs:   Resp 16   Ht 165.1 cm (65\")   Wt 125 kg (275 lb 9.6 oz)   BMI 45.86 kg/m²      Physical Exam  Constitutional:       General: She is not in acute distress.     Appearance: Normal appearance. She is well-developed and normal weight.   Cardiovascular:      Heart sounds: No murmur heard.  Pulmonary:      Effort: Pulmonary effort is normal.      Breath sounds: Normal air entry.      Comments: non-labored breathing  Abdominal:      Palpations: Abdomen is soft.      Hernia: A hernia is present.   Neurological:      Mental Status: She is alert and oriented to person, place, and time.      Motor: Motor function is intact.          Result Review :            Procedures        Assessment and Plan    There are no diagnoses linked to this encounter.     1. Patient with an incarcerated umbilical hernia causing skin issues and bleeding.    We will plan for robotic umbilical hernia repair with Phasix mesh versus permanent mesh due to the potential for the patient to have a periumbilical infection. I will prescribe her a 1-week course of antibiotics today and contact Dr. Anne Marquis to obtain cardiac clearance to discontinue her Xarelto for 48 hours prior to the operation. Risks, benefits, and alternatives to the procedure were discussed extensively. All questions were answered and the patient voiced " understanding and agreed to proceed with the above plan.      Follow Up   No follow-ups on file.  Patient was given instructions and counseling regarding her condition or for health maintenance advice. Please see specific information pulled into the AVS if appropriate.     Transcribed from ambient dictation for Epifanio Morales MD by LOUIE BELTRAN.  07/06/22   13:48 EDT    Patient verbalized consent to the visit recording.

## 2022-07-07 ENCOUNTER — DOCUMENTATION (OUTPATIENT)
Dept: SURGERY | Facility: CLINIC | Age: 62
End: 2022-07-07

## 2022-07-07 ENCOUNTER — TELEPHONE (OUTPATIENT)
Dept: SURGERY | Facility: CLINIC | Age: 62
End: 2022-07-07

## 2022-07-07 NOTE — TELEPHONE ENCOUNTER
Pt called to report that she cannot see her cardiologist about the Xarelto because she owes them too much money. Jaleesa Piper is the one that prescribed and monitors the xarelto. She wants to know if she can give permission for it to be held before surgery. Please call her and advise.

## 2022-07-07 NOTE — TELEPHONE ENCOUNTER
Spoke with the patient and she now states that she see's Dr Archuleta  for her blood thinner. Letter sent to Dr Archuleta.

## 2022-07-21 NOTE — TELEPHONE ENCOUNTER
Cardiac clearance from Dr Elizabeth is approved and scanned in under Media in patient's chart. Patient's surgery is 08/01/22and was approved to hold blood thinner 2 days prior.

## 2022-07-25 ENCOUNTER — PREP FOR SURGERY (OUTPATIENT)
Dept: OTHER | Facility: HOSPITAL | Age: 62
End: 2022-07-25

## 2022-07-25 ENCOUNTER — TELEPHONE (OUTPATIENT)
Dept: SURGERY | Facility: CLINIC | Age: 62
End: 2022-07-25

## 2022-07-25 RX ORDER — SULFAMETHOXAZOLE AND TRIMETHOPRIM 800; 160 MG/1; MG/1
1 TABLET ORAL 2 TIMES DAILY
Qty: 14 TABLET | Refills: 0 | Status: SHIPPED | OUTPATIENT
Start: 2022-07-25 | End: 2022-07-26

## 2022-07-25 NOTE — TELEPHONE ENCOUNTER
Pt finished antibiotics that were prescribed and got better with no more bleeding from navel. Now, the bleeding has returned from the navel. Pt wants to know if she needs to be seen or can she just have another round of antibiotics?     [phone]

## 2022-07-25 NOTE — TELEPHONE ENCOUNTER
SPOKE WITH THE PATIENT AND LET HER KNOW DR BARAJAS WAS IN SURGERY AND THAT I WOULD SEND A MESSAGE TO HIM. I ALSO LET HER KNOW IT MAY BE TOMORROW BEFORE I KNEW ANYTHING.

## 2022-07-28 ENCOUNTER — OFFICE VISIT (OUTPATIENT)
Dept: UROLOGY | Facility: CLINIC | Age: 62
End: 2022-07-28

## 2022-07-28 VITALS — BODY MASS INDEX: 45.82 KG/M2 | HEIGHT: 65 IN | WEIGHT: 275 LBS

## 2022-07-28 DIAGNOSIS — N39.41 URGE INCONTINENCE OF URINE: Primary | ICD-10-CM

## 2022-07-28 LAB
BILIRUB BLD-MCNC: ABNORMAL MG/DL
CLARITY, POC: CLEAR
COLOR UR: ABNORMAL
EXPIRATION DATE: 723
GLUCOSE UR STRIP-MCNC: NEGATIVE MG/DL
KETONES UR QL: NEGATIVE
LEUKOCYTE EST, POC: NEGATIVE
Lab: ABNORMAL
NITRITE UR-MCNC: NEGATIVE MG/ML
PH UR: 6 [PH] (ref 5–8)
PROT UR STRIP-MCNC: NEGATIVE MG/DL
RBC # UR STRIP: NEGATIVE /UL
SP GR UR: 1.02 (ref 1–1.03)
UROBILINOGEN UR QL: NORMAL

## 2022-07-28 PROCEDURE — 99213 OFFICE O/P EST LOW 20 MIN: CPT | Performed by: NURSE PRACTITIONER

## 2022-07-28 RX ORDER — OXYBUTYNIN CHLORIDE 5 MG/1
5 TABLET, EXTENDED RELEASE ORAL DAILY
Qty: 90 TABLET | Refills: 3 | Status: SHIPPED | OUTPATIENT
Start: 2022-07-28

## 2022-07-28 RX ORDER — CETIRIZINE HYDROCHLORIDE 10 MG/1
10 TABLET ORAL DAILY
COMMUNITY
Start: 2022-07-11 | End: 2022-12-05 | Stop reason: SDUPTHER

## 2022-07-28 NOTE — PROGRESS NOTES
Chief Complaint: urge incontinence     Subjective         History of Present Illness  Chiara Zee is a 62 y.o. female presents to [facility] UROLOG[facility] to be seen for f/u Urinary incontinence.    At last visit we discussed that her Caffeine intake may be contributing to her symptoms.    She has cut out caffeine and is only drinking pink lemonade.    She states that her leakage has improved but not gone entirely.      Previous:  The patient states that she will not have the urge to go and will stand up then will have full bladder leakage and at times will get the urge to go but cannot make it.    She drinks coffee 20 oz and pepsi zero x2 daily even during the night.     She has nocturia x 1 rarely 2x     She voids every hour during the day.    She does have leakage with cough laugh and sneeze, but not as bothersome.    She has not had  Hysterectomy.    Objective     Past Medical History:   Diagnosis Date   • COPD (chronic obstructive pulmonary disease) (HCC)     INHALERS PRN   • Coronary artery disease     FOLLOWS W/ELSHIEKH, NO C/O CP OR  SOA   • Hyperlipidemia    • Hypertension    • Myocardial infarction (HCC)     PT STATES UNKNOWN DATE   • Sleep apnea    • Umbilical hernia without obstruction and without gangrene        Past Surgical History:   Procedure Laterality Date   •  SECTION     • OTHER SURGICAL HISTORY      right wrist fracture, plate and screws         Current Outpatient Medications:   •  albuterol sulfate  (90 Base) MCG/ACT inhaler, Inhale 2 puffs Every 4 (Four) Hours As Needed for Wheezing., Disp: 1 g, Rfl: 11  •  atorvastatin (LIPITOR) 20 MG tablet, Take 20 mg by mouth Every Night., Disp: , Rfl:   •  busPIRone (BUSPAR) 10 MG tablet, Take 10 mg by mouth 3 (Three) Times a Day. TAKES FIRST DOSE LATER IN THE DAY, Disp: , Rfl:   •  cetirizine (zyrTEC) 10 MG tablet, Take 10 mg by mouth Daily., Disp: , Rfl:   •  meloxicam (MOBIC) 15 MG tablet, Take 15 mg by mouth  "Daily., Disp: , Rfl:   •  metoprolol succinate XL (TOPROL-XL) 25 MG 24 hr tablet, Take 25 mg by mouth Every Night., Disp: , Rfl:   •  pantoprazole (PROTONIX) 40 MG EC tablet, Take 40 mg by mouth Every Night., Disp: , Rfl:   •  Probiotic Product (PROBIOTIC PO), Take  by mouth., Disp: , Rfl:   •  sertraline (ZOLOFT) 50 MG tablet, Take 50 mg by mouth Daily., Disp: , Rfl:   •  tiotropium bromide-olodaterol (Stiolto Respimat) 2.5-2.5 MCG/ACT aerosol solution inhaler, Inhale 2 puffs Daily., Disp: 2 each, Rfl: 0  •  Xarelto 2.5 MG tablet, Take 2.5 mg by mouth 2 (Two) Times a Day With Meals. LAST DOSE A.M. 22 AS INSTRUCTED BY DR. COLLINS, Disp: , Rfl:   •  oxybutynin XL (DITROPAN-XL) 5 MG 24 hr tablet, Take 1 tablet by mouth Daily., Disp: 90 tablet, Rfl: 3    No Known Allergies     Family History   Problem Relation Age of Onset   • Diabetes Sister    • Diabetes Brother        Social History     Socioeconomic History   • Marital status:    Tobacco Use   • Smoking status: Former Smoker     Packs/day: 1.00     Years: 45.00     Pack years: 45.00     Types: Cigarettes     Quit date: 2016     Years since quittin.5   • Smokeless tobacco: Never Used   Vaping Use   • Vaping Use: Never used   Substance and Sexual Activity   • Alcohol use: Never   • Drug use: Never   • Sexual activity: Defer       Vital Signs:   Ht 165.1 cm (65\")   Wt 125 kg (275 lb)   BMI 45.76 kg/m²      Physical Exam     Result Review :   The following data was reviewed by: DANI Rivas on 2022:  Results for orders placed or performed in visit on 22   POC Urinalysis Dipstick, Automated    Specimen: Urine   Result Value Ref Range    Color Dark Yellow Yellow, Straw, Dark Yellow, Carla    Clarity, UA Clear Clear    Specific Gravity  1.025 1.005 - 1.030    pH, Urine 6.0 5.0 - 8.0    Leukocytes Negative Negative    Nitrite, UA Negative Negative    Protein, POC Negative Negative mg/dL    Glucose, UA Negative Negative mg/dL    " Ketones, UA Negative Negative    Urobilinogen, UA Normal Normal    Bilirubin Small (1+) (A) Negative    Blood, UA Negative Negative    Lot Number [id number]     Expiration Date 723                 Procedures        Assessment and Plan    Diagnoses and all orders for this visit:    1. Urge incontinence of urine (Primary)  -     POC Urinalysis Dipstick, Automated  -     oxybutynin XL (DITROPAN-XL) 5 MG 24 hr tablet; Take 1 tablet by mouth Daily.  Dispense: 90 tablet; Refill: 3       Given refractory symptoms patient would like to try medication.     Prescription sent for oxybutynin.     We discussed common side effects of this medication class I.e dry mouth, eyes, and constipation.      I spent  15 minutes caring for Chiara on this date of service. This time includes time spent by me in the following activities:reviewing tests, obtaining and/or reviewing a separately obtained history, performing a medically appropriate examination and/or evaluation , counseling and educating the patient/family/caregiver, ordering medications, tests, or procedures, and documenting information in the medical record  Follow Up   Return in about 4 weeks (around 8/25/2022) for f/u PVR check .  Patient was given instructions and counseling regarding her condition or for health maintenance advice. Please see specific information pulled into the AVS if appropriate.         This document has been electronically signed by DANI Rivas  July 28, 2022 14:28 EDT

## 2022-07-29 ENCOUNTER — ANESTHESIA EVENT (OUTPATIENT)
Dept: PERIOP | Facility: HOSPITAL | Age: 62
End: 2022-07-29

## 2022-08-01 ENCOUNTER — HOSPITAL ENCOUNTER (OUTPATIENT)
Facility: HOSPITAL | Age: 62
Setting detail: HOSPITAL OUTPATIENT SURGERY
Discharge: HOME | End: 2022-08-01
Attending: SURGERY | Admitting: SURGERY

## 2022-08-01 ENCOUNTER — ANESTHESIA (OUTPATIENT)
Dept: PERIOP | Facility: HOSPITAL | Age: 62
End: 2022-08-01

## 2022-08-01 VITALS
HEART RATE: 89 BPM | RESPIRATION RATE: 20 BRPM | WEIGHT: 278 LBS | OXYGEN SATURATION: 94 % | BODY MASS INDEX: 47.46 KG/M2 | DIASTOLIC BLOOD PRESSURE: 65 MMHG | TEMPERATURE: 97.8 F | SYSTOLIC BLOOD PRESSURE: 108 MMHG | HEIGHT: 64 IN

## 2022-08-01 DIAGNOSIS — K42.9 UMBILICAL HERNIA WITHOUT OBSTRUCTION AND WITHOUT GANGRENE: Primary | ICD-10-CM

## 2022-08-01 PROCEDURE — 49652 PR LAP, VENTRAL HERNIA REPAIR,REDUCIBLE: CPT | Performed by: SURGERY

## 2022-08-01 PROCEDURE — 25010000002 DEXAMETHASONE PER 1 MG: Performed by: NURSE ANESTHETIST, CERTIFIED REGISTERED

## 2022-08-01 PROCEDURE — 25010000002 HYDROMORPHONE 1 MG/ML SOLUTION: Performed by: NURSE ANESTHETIST, CERTIFIED REGISTERED

## 2022-08-01 PROCEDURE — 25010000002 PROPOFOL 10 MG/ML EMULSION: Performed by: NURSE ANESTHETIST, CERTIFIED REGISTERED

## 2022-08-01 PROCEDURE — 0 LIDOCAINE 1 % SOLUTION 10 ML VIAL: Performed by: SURGERY

## 2022-08-01 PROCEDURE — 88302 TISSUE EXAM BY PATHOLOGIST: CPT | Performed by: SURGERY

## 2022-08-01 PROCEDURE — 49652 PR LAP, VENTRAL HERNIA REPAIR,REDUCIBLE: CPT | Performed by: SPECIALIST/TECHNOLOGIST, OTHER

## 2022-08-01 PROCEDURE — 0 HYDROMORPHONE 1 MG/ML SOLUTION: Performed by: NURSE ANESTHETIST, CERTIFIED REGISTERED

## 2022-08-01 PROCEDURE — 25010000002 MIDAZOLAM PER 1 MG: Performed by: ANESTHESIOLOGY

## 2022-08-01 PROCEDURE — 25010000002 ONDANSETRON PER 1 MG: Performed by: NURSE ANESTHETIST, CERTIFIED REGISTERED

## 2022-08-01 PROCEDURE — C1781 MESH (IMPLANTABLE): HCPCS | Performed by: SURGERY

## 2022-08-01 PROCEDURE — 93005 ELECTROCARDIOGRAM TRACING: CPT | Performed by: SURGERY

## 2022-08-01 DEVICE — PHASIX ST MESH, CIRCLE
Type: IMPLANTABLE DEVICE | Site: UMBILICAL | Status: FUNCTIONAL
Brand: PHASIX ST MESH

## 2022-08-01 DEVICE — ABSORBABLE WOUND CLOSURE DEVICE
Type: IMPLANTABLE DEVICE | Site: UMBILICAL | Status: FUNCTIONAL
Brand: V-LOC 180

## 2022-08-01 RX ORDER — PROPOFOL 10 MG/ML
VIAL (ML) INTRAVENOUS AS NEEDED
Status: DISCONTINUED | OUTPATIENT
Start: 2022-08-01 | End: 2022-08-01 | Stop reason: SURG

## 2022-08-01 RX ORDER — MIDAZOLAM HYDROCHLORIDE 1 MG/ML
2 INJECTION INTRAMUSCULAR; INTRAVENOUS ONCE
Status: COMPLETED | OUTPATIENT
Start: 2022-08-01 | End: 2022-08-01

## 2022-08-01 RX ORDER — DEXAMETHASONE SODIUM PHOSPHATE 4 MG/ML
INJECTION, SOLUTION INTRA-ARTICULAR; INTRALESIONAL; INTRAMUSCULAR; INTRAVENOUS; SOFT TISSUE AS NEEDED
Status: DISCONTINUED | OUTPATIENT
Start: 2022-08-01 | End: 2022-08-01 | Stop reason: SURG

## 2022-08-01 RX ORDER — HYDROCODONE BITARTRATE AND ACETAMINOPHEN 5; 325 MG/1; MG/1
1 TABLET ORAL ONCE AS NEEDED
Status: DISCONTINUED | OUTPATIENT
Start: 2022-08-01 | End: 2022-08-01 | Stop reason: HOSPADM

## 2022-08-01 RX ORDER — KETAMINE HYDROCHLORIDE 50 MG/ML
INJECTION, SOLUTION, CONCENTRATE INTRAMUSCULAR; INTRAVENOUS AS NEEDED
Status: DISCONTINUED | OUTPATIENT
Start: 2022-08-01 | End: 2022-08-01 | Stop reason: SURG

## 2022-08-01 RX ORDER — CLINDAMYCIN PHOSPHATE 900 MG/50ML
900 INJECTION INTRAVENOUS ONCE
Status: COMPLETED | OUTPATIENT
Start: 2022-08-01 | End: 2022-08-01

## 2022-08-01 RX ORDER — ONDANSETRON 2 MG/ML
4 INJECTION INTRAMUSCULAR; INTRAVENOUS EVERY 6 HOURS PRN
Status: DISCONTINUED | OUTPATIENT
Start: 2022-08-01 | End: 2022-08-01 | Stop reason: HOSPADM

## 2022-08-01 RX ORDER — MEPERIDINE HYDROCHLORIDE 25 MG/ML
12.5 INJECTION INTRAMUSCULAR; INTRAVENOUS; SUBCUTANEOUS
Status: DISCONTINUED | OUTPATIENT
Start: 2022-08-01 | End: 2022-08-01 | Stop reason: HOSPADM

## 2022-08-01 RX ORDER — PROMETHAZINE HYDROCHLORIDE 12.5 MG/1
25 TABLET ORAL ONCE AS NEEDED
Status: DISCONTINUED | OUTPATIENT
Start: 2022-08-01 | End: 2022-08-01 | Stop reason: HOSPADM

## 2022-08-01 RX ORDER — LIDOCAINE HYDROCHLORIDE 20 MG/ML
INJECTION, SOLUTION EPIDURAL; INFILTRATION; INTRACAUDAL; PERINEURAL AS NEEDED
Status: DISCONTINUED | OUTPATIENT
Start: 2022-08-01 | End: 2022-08-01 | Stop reason: SURG

## 2022-08-01 RX ORDER — PHENYLEPHRINE HCL IN 0.9% NACL 1 MG/10 ML
SYRINGE (ML) INTRAVENOUS AS NEEDED
Status: DISCONTINUED | OUTPATIENT
Start: 2022-08-01 | End: 2022-08-01 | Stop reason: SURG

## 2022-08-01 RX ORDER — DEXMEDETOMIDINE HYDROCHLORIDE 100 UG/ML
INJECTION, SOLUTION INTRAVENOUS AS NEEDED
Status: DISCONTINUED | OUTPATIENT
Start: 2022-08-01 | End: 2022-08-01 | Stop reason: SURG

## 2022-08-01 RX ORDER — HYDROCODONE BITARTRATE AND ACETAMINOPHEN 5; 325 MG/1; MG/1
1-2 TABLET ORAL EVERY 4 HOURS PRN
Qty: 20 TABLET | Refills: 0 | Status: SHIPPED | OUTPATIENT
Start: 2022-08-01

## 2022-08-01 RX ORDER — ONDANSETRON 2 MG/ML
4 INJECTION INTRAMUSCULAR; INTRAVENOUS ONCE AS NEEDED
Status: DISCONTINUED | OUTPATIENT
Start: 2022-08-01 | End: 2022-08-01 | Stop reason: HOSPADM

## 2022-08-01 RX ORDER — ACETAMINOPHEN 500 MG
1000 TABLET ORAL ONCE
Status: COMPLETED | OUTPATIENT
Start: 2022-08-01 | End: 2022-08-01

## 2022-08-01 RX ORDER — DOCUSATE SODIUM 100 MG/1
100 CAPSULE, LIQUID FILLED ORAL 2 TIMES DAILY PRN
Qty: 10 CAPSULE | Refills: 1 | Status: SHIPPED | OUTPATIENT
Start: 2022-08-01 | End: 2023-08-01

## 2022-08-01 RX ORDER — ONDANSETRON 2 MG/ML
INJECTION INTRAMUSCULAR; INTRAVENOUS AS NEEDED
Status: DISCONTINUED | OUTPATIENT
Start: 2022-08-01 | End: 2022-08-01 | Stop reason: SURG

## 2022-08-01 RX ORDER — PROMETHAZINE HYDROCHLORIDE 25 MG/1
25 SUPPOSITORY RECTAL ONCE AS NEEDED
Status: DISCONTINUED | OUTPATIENT
Start: 2022-08-01 | End: 2022-08-01 | Stop reason: HOSPADM

## 2022-08-01 RX ORDER — SODIUM CHLORIDE, SODIUM LACTATE, POTASSIUM CHLORIDE, CALCIUM CHLORIDE 600; 310; 30; 20 MG/100ML; MG/100ML; MG/100ML; MG/100ML
100 INJECTION, SOLUTION INTRAVENOUS CONTINUOUS
Status: DISCONTINUED | OUTPATIENT
Start: 2022-08-01 | End: 2022-08-01 | Stop reason: HOSPADM

## 2022-08-01 RX ORDER — SODIUM CHLORIDE 9 MG/ML
40 INJECTION, SOLUTION INTRAVENOUS AS NEEDED
Status: DISCONTINUED | OUTPATIENT
Start: 2022-08-01 | End: 2022-08-01 | Stop reason: HOSPADM

## 2022-08-01 RX ORDER — SODIUM CHLORIDE, SODIUM LACTATE, POTASSIUM CHLORIDE, CALCIUM CHLORIDE 600; 310; 30; 20 MG/100ML; MG/100ML; MG/100ML; MG/100ML
9 INJECTION, SOLUTION INTRAVENOUS CONTINUOUS PRN
Status: DISCONTINUED | OUTPATIENT
Start: 2022-08-01 | End: 2022-08-01 | Stop reason: HOSPADM

## 2022-08-01 RX ORDER — OXYCODONE HYDROCHLORIDE 5 MG/1
5 TABLET ORAL
Status: DISCONTINUED | OUTPATIENT
Start: 2022-08-01 | End: 2022-08-01 | Stop reason: HOSPADM

## 2022-08-01 RX ORDER — SODIUM CHLORIDE 0.9 % (FLUSH) 0.9 %
10 SYRINGE (ML) INJECTION EVERY 12 HOURS SCHEDULED
Status: DISCONTINUED | OUTPATIENT
Start: 2022-08-01 | End: 2022-08-01 | Stop reason: HOSPADM

## 2022-08-01 RX ORDER — ROCURONIUM BROMIDE 10 MG/ML
INJECTION, SOLUTION INTRAVENOUS AS NEEDED
Status: DISCONTINUED | OUTPATIENT
Start: 2022-08-01 | End: 2022-08-01 | Stop reason: SURG

## 2022-08-01 RX ORDER — MAGNESIUM HYDROXIDE 1200 MG/15ML
LIQUID ORAL AS NEEDED
Status: DISCONTINUED | OUTPATIENT
Start: 2022-08-01 | End: 2022-08-01 | Stop reason: HOSPADM

## 2022-08-01 RX ORDER — GLYCOPYRROLATE 0.2 MG/ML
INJECTION INTRAMUSCULAR; INTRAVENOUS AS NEEDED
Status: DISCONTINUED | OUTPATIENT
Start: 2022-08-01 | End: 2022-08-01 | Stop reason: SURG

## 2022-08-01 RX ORDER — EPHEDRINE SULFATE 50 MG/ML
INJECTION, SOLUTION INTRAVENOUS AS NEEDED
Status: DISCONTINUED | OUTPATIENT
Start: 2022-08-01 | End: 2022-08-01 | Stop reason: SURG

## 2022-08-01 RX ORDER — SODIUM CHLORIDE 0.9 % (FLUSH) 0.9 %
10 SYRINGE (ML) INJECTION AS NEEDED
Status: DISCONTINUED | OUTPATIENT
Start: 2022-08-01 | End: 2022-08-01 | Stop reason: HOSPADM

## 2022-08-01 RX ADMIN — LIDOCAINE HYDROCHLORIDE 100 MG: 20 INJECTION, SOLUTION EPIDURAL; INFILTRATION; INTRACAUDAL; PERINEURAL at 07:29

## 2022-08-01 RX ADMIN — DEXAMETHASONE SODIUM PHOSPHATE 8 MG: 4 INJECTION, SOLUTION INTRA-ARTICULAR; INTRALESIONAL; INTRAMUSCULAR; INTRAVENOUS; SOFT TISSUE at 07:33

## 2022-08-01 RX ADMIN — GLYCOPYRROLATE 0.2 MG: 0.2 INJECTION INTRAMUSCULAR; INTRAVENOUS at 07:29

## 2022-08-01 RX ADMIN — SODIUM CHLORIDE, POTASSIUM CHLORIDE, SODIUM LACTATE AND CALCIUM CHLORIDE: 600; 310; 30; 20 INJECTION, SOLUTION INTRAVENOUS at 08:54

## 2022-08-01 RX ADMIN — SUGAMMADEX 100 MG: 100 INJECTION, SOLUTION INTRAVENOUS at 09:07

## 2022-08-01 RX ADMIN — ONDANSETRON 4 MG: 2 INJECTION INTRAMUSCULAR; INTRAVENOUS at 07:33

## 2022-08-01 RX ADMIN — Medication 300 MCG: at 07:39

## 2022-08-01 RX ADMIN — MIDAZOLAM HYDROCHLORIDE 2 MG: 2 INJECTION, SOLUTION INTRAMUSCULAR; INTRAVENOUS at 07:10

## 2022-08-01 RX ADMIN — Medication 200 MCG: at 08:22

## 2022-08-01 RX ADMIN — HYDROMORPHONE HYDROCHLORIDE 0.5 MG: 1 INJECTION, SOLUTION INTRAMUSCULAR; INTRAVENOUS; SUBCUTANEOUS at 10:20

## 2022-08-01 RX ADMIN — EPHEDRINE SULFATE 15 MG: 50 INJECTION INTRAVENOUS at 07:39

## 2022-08-01 RX ADMIN — HYDROMORPHONE HYDROCHLORIDE 1 MG: 1 INJECTION, SOLUTION INTRAMUSCULAR; INTRAVENOUS; SUBCUTANEOUS at 09:00

## 2022-08-01 RX ADMIN — ACETAMINOPHEN 1000 MG: 500 TABLET ORAL at 06:59

## 2022-08-01 RX ADMIN — ROCURONIUM BROMIDE 50 MG: 10 INJECTION INTRAVENOUS at 07:29

## 2022-08-01 RX ADMIN — Medication 300 MCG: at 07:59

## 2022-08-01 RX ADMIN — SODIUM CHLORIDE, POTASSIUM CHLORIDE, SODIUM LACTATE AND CALCIUM CHLORIDE 100 ML/HR: 600; 310; 30; 20 INJECTION, SOLUTION INTRAVENOUS at 06:42

## 2022-08-01 RX ADMIN — PROPOFOL 150 MG: 10 INJECTION, EMULSION INTRAVENOUS at 07:29

## 2022-08-01 RX ADMIN — PROPOFOL 50 MG: 10 INJECTION, EMULSION INTRAVENOUS at 08:54

## 2022-08-01 RX ADMIN — HYDROMORPHONE HYDROCHLORIDE 0.5 MG: 1 INJECTION, SOLUTION INTRAMUSCULAR; INTRAVENOUS; SUBCUTANEOUS at 09:50

## 2022-08-01 RX ADMIN — KETAMINE HYDROCHLORIDE 20 MG: 50 INJECTION, SOLUTION INTRAMUSCULAR; INTRAVENOUS at 07:20

## 2022-08-01 RX ADMIN — DEXMEDETOMIDINE HYDROCHLORIDE 30 MCG: 100 INJECTION, SOLUTION, CONCENTRATE INTRAVENOUS at 07:20

## 2022-08-01 RX ADMIN — EPHEDRINE SULFATE 15 MG: 50 INJECTION INTRAVENOUS at 07:59

## 2022-08-01 RX ADMIN — HYDROMORPHONE HYDROCHLORIDE 1 MG: 1 INJECTION, SOLUTION INTRAMUSCULAR; INTRAVENOUS; SUBCUTANEOUS at 07:49

## 2022-08-01 RX ADMIN — KETAMINE HYDROCHLORIDE 30 MG: 50 INJECTION, SOLUTION INTRAMUSCULAR; INTRAVENOUS at 08:03

## 2022-08-01 RX ADMIN — HYDROCODONE BITARTRATE AND ACETAMINOPHEN 1 TABLET: 5; 325 TABLET ORAL at 11:44

## 2022-08-01 RX ADMIN — CLINDAMYCIN IN 5 PERCENT DEXTROSE 900 MG: 18 INJECTION, SOLUTION INTRAVENOUS at 07:20

## 2022-08-01 NOTE — ANESTHESIA POSTPROCEDURE EVALUATION
Patient: Chiara Zee    Procedure Summary     Date: 08/01/22 Room / Location: [facility] OR  / [facility] MAIN OR    Anesthesia Start: 0720 Anesthesia Stop: 0920    Procedure: Robotic Umbilical Hernia Repair with Mesh Placement (N/A Abdomen) Diagnosis:       Umbilical hernia without obstruction and without gangrene      (Umbilical hernia without obstruction and without gangrene [K42.9])    Surgeons: Epifanio Morales MD Provider: Avila Pro MD    Anesthesia Type: general ASA Status: 3          Anesthesia Type: general    Vitals  Vitals Value Taken Time   BP 88/35 08/01/22 0929   Temp 36.1 °C (96.9 °F) 08/01/22 0920   Pulse 82 08/01/22 0933   Resp 12 08/01/22 0920   SpO2 93 % 08/01/22 0933   Vitals shown include unvalidated device data.        Post Anesthesia Care and Evaluation    Patient location during evaluation: bedside  Patient participation: complete - patient participated  Level of consciousness: awake  Pain management: adequate    Airway patency: patent  Anesthetic complications: No anesthetic complications  PONV Status: none  Cardiovascular status: acceptable and stable  Respiratory status: acceptable  Hydration status: acceptable    Comments: An Anesthesiologist personally participated in the most demanding procedures (including induction and emergence if applicable) in the anesthesia plan, monitored the course of anesthesia administration at frequent intervals and remained physically present and available for immediate diagnosis and treatment of emergencies.        Patient reports doing well. Denies SOA. Reports she will use her CPAP at home. RN has weaned patient off O2 NC. She has been doing incentive spirometry post-op. Patient is well-appearing. Awake and alert. O2 saturation 95% on RA. Non-labored breathing. I instructed patient to continue doing incentive spirometry once every hour and to use CPAP at night like usual. Patient understands that if she experiences any SOA or  lightheadedness, to report to the ER immediately. Patient ok to be discharged home at this time.

## 2022-08-01 NOTE — OP NOTE
Preoperative diagnosis: Umbilical hernia with chronic infection    Postoperative diagnosis: Same    Procedure: Robotic umbilical hernia repair    Surgeon: Andrew    Anesthesia: General    Assistant: Florence Zepeda    EBL: 11    Specimens: None    Complications: None    Indications: 62-year-old female whose had chronic drainage from an umbilical hernia over the course the last few months.  Presents today for repair.    PROCEDURE    Patient was seen in the preoperative holding area.  Risks, benefits, alternatives of the operation were again discussed in detail.  All of the patient and family's questions were answered.  They voiced understanding, and agreed to proceed.    Patient was brought to the operating room.  Monitoring devices and Sam stockings were placed.  Anesthesia was administered.  Patient was prepped and draped in standard surgical fashion.  After prepping and draping a timeout was performed to verify both the correct patient and correct procedure.    We began by injecting local anesthesia in the left upper quadrant.  An incision to accommodate a 12 mm trocar was made in the left upper quadrant, and the Veress needle was inserted into the abdomen.  Intra-abdominal placement was verified with a normal saline drop test.  After verification, the abdomen was insufflated to 15 mmHg pressure.  Once the abdomen was insufflated, we removed the Veress needle.  Then using the Visiport technique the abdomen was entered in the left upper quadrant.  Once the abdomen was entered, we reinserted the laparoscope and looked underneath our Veress needle and Visiport entry sites, making sure there was no underlying injury.  We then went about placing our subsequent trochars.  After injection of local anesthesia and under direct visualization, an 8 mm trocar was placed in the left upper, left mid, and left lower abdomen.    We then brought in and docked the robot.  Once the robot was in appropriate position, we began by  reducing any contents that were incarcerated in the umbilical hernia.  We then dissected into the preperitoneal space, making sure reduced all contents from the umbilical hernia.  She had some necrotic hernia sac which seem to be the source of her chronic drainage.  This was excised.  Once everything was completely reduced from the umbilical hernia and we had a complete and adequate exposure, we closed the hernia defect primarily with a running 0 V-Loc suture.  Once the defect was closed we then brought in a piece of phasix ST mesh.  It was 11 cm round.  A chandelier stitch had been placed in the mesh, the chandelier stitch was grasped with a Elias Howe device through the center of the umbilical defect and pulled up flush with the anterior abdominal wall.  The mesh was then sewn into place circumferentially with multiple 3 0 V-Loc sutures.  We inspected the mesh to make sure it was sewn in 360 degrees with no gaps or folds.  We selected the phasic's mesh secondary to her apparent chronic infection and the necrotic hernia sac.  Satisfied with her primary repair and coverage with the mesh, we then inspected the abdomen making sure everything appeared to be hemostatic and there were no obvious underlying injuries.  We then remove the robotic arms and undocked the robot.    We then removed the 12 mm trocar, and that site was closed with a Elias Howe device and 0 Vicryl suture under direct visualization.  The remaining trochars were then removed under direct visualization, and the abdomen was desufflated.  Skin incisions were closed with subcuticular 4-0 Vicryl stitch and dressed with skin glue.    The patient was then aroused anesthesia, and taken off the OR table, and taken to PACU in stable condition.  Sponge, needle, and instrument counts were correct x2.  Florence Zepeda was present for the entire the procedure and helped in all portions of the case.      Assistant: Florence Zepeda CSA was responsible for  performing the following activities: Retraction, Suction, Irrigation, Suturing, Closing, Placing Dressing and Held/Positioned Camera if necessary for the case and their skilled assistance was necessary for the success of this case.  The operative note was dictated with the help of the dragon dictation system.

## 2022-08-01 NOTE — DISCHARGE INSTRUCTIONS
DISCHARGE INSTRUCTIONS  HERNIA      For your surgery you had:  General anesthesia (you may have a sore throat for the first 24 hours)  IV sedation.  Local anesthesia  Monitored anesthesia care   You may experience dizziness, drowsiness, or light-headedness for several hours following surgery/procedure.  Do not stay alone today or tonight.  Limit your activity for 24 hours.  Resume your diet slowly.  Follow whatever special dietary instructions you may have been given by your doctor.  You should not drive, operate machinery, drink alcohol, or sign legally binding documents for 24 hours or while you are taking pain medication.  Last dose of pain medication was given at:   .  NOTIFY YOUR DOCTOR IF YOU EXPERIENCE ANY OF THE FOLLOWING:  Temperature greater than 101 degrees Fahrenheit  Shaking Chills  Redness or excessive drainage from incision  Nausea, vomiting and/or pain that is not controlled by prescribed medications  Increase in bleeding or bleeding that is excessive  Unable to urinate in 6 hours after surgery  If unable to reach your doctor, please go to the closest Emergency Room [] You may remove dressing:   [] in 24 hours   [] in 48 hours   [] Other:    [] You may shower or bathe:    Apply an ice pack for 24-48 hours.  Do not do any heavy lifting, pushing or pulling.  You may walk up and down stairs.  You may ride in a car but do not drive until instructed by your physician.  Avoid constipation.  If unable to urinate in 6 to 8 hours after surgery or urinating frequently in small amounts, notify your doctor or go to the nearest Emergency Room.  Medications per physician instructions as indicated on Discharge Medication Information Sheet.    SPECIAL INSTRUCTIONS:  Tylenol at 06:59   Norco at 11:44            I have read and received the above instructions.     Patient/Responsible Party's Signature Date/Time     RN Signature Date/Time

## 2022-08-01 NOTE — ANESTHESIA PREPROCEDURE EVALUATION
Anesthesia Evaluation     Patient summary reviewed and Nursing notes reviewed   no history of anesthetic complications:  NPO Solid Status: > 8 hours  NPO Liquid Status: > 2 hours           Airway   Mallampati: II  TM distance: >3 FB  Neck ROM: full  No difficulty expected  Dental    (+) edentulous    Pulmonary - normal exam    breath sounds clear to auscultation  (+) pleural effusion, COPD, sleep apnea,   Cardiovascular - normal exam  Exercise tolerance: good (4-7 METS)    Rhythm: regular  Rate: normal    (+) hypertension well controlled, valvular problems/murmurs murmur, past MI  >12 months, CAD, hyperlipidemia,       Neuro/Psych- negative ROS  GI/Hepatic/Renal/Endo    (+) obesity,  GERD well controlled, GI bleeding resolved,     Musculoskeletal (-) negative ROS    Abdominal    Substance History - negative use     OB/GYN negative ob/gyn ROS         Other - negative ROS                       Anesthesia Plan    ASA 3     general     (Patient understands anesthesia not responsible for dental damage.  Didn't take BBlocker since 7/30/22)  intravenous induction     Anesthetic plan, risks, benefits, and alternatives have been provided, discussed and informed consent has been obtained with: patient.    Plan discussed with CRNA.        CODE STATUS:

## 2022-08-02 LAB
CYTO UR: NORMAL
LAB AP CASE REPORT: NORMAL
LAB AP CLINICAL INFORMATION: NORMAL
PATH REPORT.FINAL DX SPEC: NORMAL
PATH REPORT.GROSS SPEC: NORMAL

## 2022-08-09 LAB — QT INTERVAL: 469 MS

## 2022-08-18 ENCOUNTER — OFFICE VISIT (OUTPATIENT)
Dept: SURGERY | Facility: CLINIC | Age: 62
End: 2022-08-18

## 2022-08-18 VITALS — HEIGHT: 64 IN | WEIGHT: 278 LBS | BODY MASS INDEX: 47.46 KG/M2

## 2022-08-18 DIAGNOSIS — K42.9 UMBILICAL HERNIA WITHOUT OBSTRUCTION AND WITHOUT GANGRENE: Primary | ICD-10-CM

## 2022-08-18 PROCEDURE — 99024 POSTOP FOLLOW-UP VISIT: CPT | Performed by: SURGERY

## 2022-08-18 NOTE — PROGRESS NOTES
Chief Complaint: Post-op Follow-up (Umbilical hernia repair)    Subjective         History of Present Illness  Chiara Zee is a 62 y.o. female presents to [facility] GENER[facility] SUR[facility]. The patient is to be seen for follow-up after robotic umbilical hernia repair.    Status post robotic umbilical hernia repair  The patient reports that she is doing well after surgery, but she continues to have some pain in the left upper quadrant. She states that she is unable to lay on her side or back all night due to the pain. She notes that the pain has not been unbearable. The patient is no longer taking any medication. She reports that she is eating and drinking well. She states that she has a knot under the skin.    Objective     Past Medical History:   Diagnosis Date   • COPD (chronic obstructive pulmonary disease) (HCC)     INHALERS PRN   • Coronary artery disease     FOLLOWS W/ELSHIEKH, NO C/O CP OR  SOA   • Hyperlipidemia    • Hypertension    • Myocardial infarction (HCC)     PT STATES UNKNOWN DATE   • Sleep apnea    • Umbilical hernia without obstruction and without gangrene        Past Surgical History:   Procedure Laterality Date   •  SECTION     • OTHER SURGICAL HISTORY      right wrist fracture, plate and screws   • VENTRAL HERNIA REPAIR N/A 2022    Procedure: Robotic Umbilical Hernia Repair with Mesh Placement;  Surgeon: Epifanio Morales MD;  Location: [facility];  Service: Robotics - [facility];  Laterality: N/A;         Current Outpatient Medications:   •  albuterol sulfate  (90 Base) MCG/ACT inhaler, Inhale 2 puffs Every 4 (Four) Hours As Needed for Wheezing., Disp: 1 g, Rfl: 11  •  atorvastatin (LIPITOR) 20 MG tablet, Take 20 mg by mouth Every Night., Disp: , Rfl:   •  busPIRone (BUSPAR) 10 MG tablet, Take 10 mg by mouth 3 (Three) Times a Day. TAKES FIRST DOSE LATER IN THE DAY, Disp: , Rfl:   •  cetirizine (zyrTEC) 10 MG tablet, Take 10 mg by mouth Daily., Disp: ,  Rfl:   •  meloxicam (MOBIC) 15 MG tablet, Take 15 mg by mouth Daily., Disp: , Rfl:   •  metoprolol succinate XL (TOPROL-XL) 25 MG 24 hr tablet, Take 25 mg by mouth Every Night., Disp: , Rfl:   •  oxybutynin XL (DITROPAN-XL) 5 MG 24 hr tablet, Take 1 tablet by mouth Daily., Disp: 90 tablet, Rfl: 3  •  pantoprazole (PROTONIX) 40 MG EC tablet, Take 40 mg by mouth Every Night., Disp: , Rfl:   •  Probiotic Product (PROBIOTIC PO), Take  by mouth., Disp: , Rfl:   •  sertraline (ZOLOFT) 50 MG tablet, Take 50 mg by mouth Daily., Disp: , Rfl:   •  tiotropium bromide-olodaterol (Stiolto Respimat) 2.5-2.5 MCG/ACT aerosol solution inhaler, Inhale 2 puffs Daily., Disp: 2 each, Rfl: 0  •  Xarelto 2.5 MG tablet, Take 2.5 mg by mouth 2 (Two) Times a Day With Meals. LAST DOSE A.M. 22 AS INSTRUCTED BY DR. COLLINS, Disp: , Rfl:   •  docusate sodium (Colace) 100 MG capsule, Take 1 capsule by mouth 2 (Two) Times a Day As Needed for Constipation., Disp: 10 capsule, Rfl: 1  •  HYDROcodone-acetaminophen (NORCO) 5-325 MG per tablet, Take 1-2 tablets by mouth Every 4 (Four) Hours As Needed (Pain)., Disp: 20 tablet, Rfl: 0    No Known Allergies     Family History   Problem Relation Age of Onset   • Diabetes Sister    • Diabetes Brother    • Malig Hyperthermia Neg Hx        Social History     Socioeconomic History   • Marital status:    Tobacco Use   • Smoking status: Former Smoker     Packs/day: 1.00     Years: 45.00     Pack years: 45.00     Types: Cigarettes     Quit date: 2016     Years since quittin.6   • Smokeless tobacco: Never Used   Vaping Use   • Vaping Use: Never used   Substance and Sexual Activity   • Alcohol use: Never   • Drug use: Never   • Sexual activity: Defer        Physical Exam  Constitutional:       General: She is not in acute distress.     Appearance: Normal appearance. She is well-developed and normal weight.   Pulmonary:      Effort: Pulmonary effort is normal.      Breath sounds: Normal air entry.    Abdominal:      General: There is no distension.      Palpations: Abdomen is soft.      Tenderness: There is no abdominal tenderness.        Post Surgical Incision  Surgical wound: Incisions are healing well. She has some minor ecchymosis around the umbilicus, but no signs of infection. No drainage.  No acute distress. Nonlabored breathing. Abdomen is soft.    Result Review :               Assessment and Plan    There are no diagnoses linked to this encounter.     1. Status post robotic umbilical hernia repair.  -The patient is doing well, healed as expected. I am pleased with her overall progress. At this point in time, she can follow up with me as needed. Call with any questions or concerns. Discussed with the patient. All questions were answered. She voiced understanding and agreed to the plan.        Follow Up   No follow-ups on file.  Patient was given instructions and counseling regarding her condition or for health maintenance advice. Please see specific information pulled into the AVS if appropriate.       Transcribed from ambient dictation for Epifanio Morales MD by THEA GUTHRIE.  08/18/22   11:56 EDT    Patient verbalized consent to the visit recording.  I have personally performed the services described in this document as transcribed by the above individual, and it is both accurate and complete.  Epifanio Morales MD  8/18/2022  17:50 EDT

## 2022-09-30 ENCOUNTER — OFFICE VISIT (OUTPATIENT)
Dept: UROLOGY | Facility: CLINIC | Age: 62
End: 2022-09-30

## 2022-09-30 VITALS — RESPIRATION RATE: 14 BRPM | HEIGHT: 64 IN | BODY MASS INDEX: 48.01 KG/M2 | WEIGHT: 281.2 LBS

## 2022-09-30 DIAGNOSIS — N39.41 URGE INCONTINENCE OF URINE: Primary | ICD-10-CM

## 2022-09-30 LAB
BILIRUB BLD-MCNC: NEGATIVE MG/DL
CLARITY, POC: CLEAR
COLOR UR: YELLOW
EXPIRATION DATE: ABNORMAL
GLUCOSE UR STRIP-MCNC: NEGATIVE MG/DL
KETONES UR QL: NEGATIVE
LEUKOCYTE EST, POC: NEGATIVE
Lab: ABNORMAL
NITRITE UR-MCNC: NEGATIVE MG/ML
PH UR: 5.5 [PH] (ref 5–8)
PROT UR STRIP-MCNC: NEGATIVE MG/DL
RBC # UR STRIP: ABNORMAL /UL
SP GR UR: 1.02 (ref 1–1.03)
SPECIMEN VOL 24H UR: NORMAL L
UROBILINOGEN UR QL: ABNORMAL

## 2022-09-30 PROCEDURE — 51798 US URINE CAPACITY MEASURE: CPT | Performed by: NURSE PRACTITIONER

## 2022-09-30 PROCEDURE — 99212 OFFICE O/P EST SF 10 MIN: CPT | Performed by: NURSE PRACTITIONER

## 2022-09-30 RX ORDER — HYDROXYZINE HYDROCHLORIDE 25 MG/1
25 TABLET, FILM COATED ORAL EVERY 8 HOURS PRN
COMMUNITY
Start: 2022-09-22

## 2022-09-30 RX ORDER — MONTELUKAST SODIUM 10 MG/1
TABLET ORAL
COMMUNITY
Start: 2022-09-29

## 2022-09-30 NOTE — PROGRESS NOTES
Chief Complaint: Urinary Incontinence    Subjective         History of Present Illness  Chiara Zee is a 62 y.o. female presents to [facility] UROLOG[facility] to be seen for f/u Urinary incontinence.    At last visit we started patient on oxybutynin 5 mg daily to see if this would help to alleviate her urgency and frequency as well as urge urinary incontinence.    The patient has continued to decrease caffeine intake.    She states she is with rare leakage.     She is happy with her symptoms to date.     She is with no bothersome side effects.       Previous:  At last visit we discussed that her Caffeine intake may be contributing to her symptoms.    She has cut out caffeine and is only drinking pink lemonade.    She states that her leakage has improved but not gone entirely.      The patient states that she will not have the urge to go and will stand up then will have full bladder leakage and at times will get the urge to go but cannot make it.    She drinks coffee 20 oz and pepsi zero x2 daily even during the night.     She has nocturia x 1 rarely 2x     She voids every hour during the day.    She does have leakage with cough laugh and sneeze, but not as bothersome.    She has not had  Hysterectomy.    Objective     Past Medical History:   Diagnosis Date   • COPD (chronic obstructive pulmonary disease) (HCC)     INHALERS PRN   • Coronary artery disease     FOLLOWS W/ELSHIEKH, NO C/O CP OR  SOA   • Hyperlipidemia    • Hypertension    • Myocardial infarction (HCC)     PT STATES UNKNOWN DATE   • Sleep apnea    • Umbilical hernia without obstruction and without gangrene        Past Surgical History:   Procedure Laterality Date   •  SECTION     • OTHER SURGICAL HISTORY      right wrist fracture, plate and screws   • VENTRAL HERNIA REPAIR N/A 2022    Procedure: Robotic Umbilical Hernia Repair with Mesh Placement;  Surgeon: Epifanio Morales MD;  Location: [facility] MAIN OR;  Service:  Mar  New;  Laterality: N/A;         Current Outpatient Medications:   •  albuterol sulfate  (90 Base) MCG/ACT inhaler, Inhale 2 puffs Every 4 (Four) Hours As Needed for Wheezing., Disp: 1 g, Rfl: 11  •  atorvastatin (LIPITOR) 20 MG tablet, Take 20 mg by mouth Every Night., Disp: , Rfl:   •  busPIRone (BUSPAR) 10 MG tablet, Take 10 mg by mouth 3 (Three) Times a Day. TAKES FIRST DOSE LATER IN THE DAY, Disp: , Rfl:   •  cetirizine (zyrTEC) 10 MG tablet, Take 10 mg by mouth Daily., Disp: , Rfl:   •  docusate sodium (Colace) 100 MG capsule, Take 1 capsule by mouth 2 (Two) Times a Day As Needed for Constipation., Disp: 10 capsule, Rfl: 1  •  hydrOXYzine (ATARAX) 25 MG tablet, Take 25 mg by mouth Every 8 (Eight) Hours As Needed., Disp: , Rfl:   •  meloxicam (MOBIC) 15 MG tablet, Take 15 mg by mouth Daily., Disp: , Rfl:   •  metoprolol succinate XL (TOPROL-XL) 25 MG 24 hr tablet, Take 25 mg by mouth Every Night., Disp: , Rfl:   •  montelukast (SINGULAIR) 10 MG tablet, , Disp: , Rfl:   •  oxybutynin XL (DITROPAN-XL) 5 MG 24 hr tablet, Take 1 tablet by mouth Daily., Disp: 90 tablet, Rfl: 3  •  pantoprazole (PROTONIX) 40 MG EC tablet, Take 40 mg by mouth Every Night., Disp: , Rfl:   •  Probiotic Product (PROBIOTIC PO), Take  by mouth., Disp: , Rfl:   •  sertraline (ZOLOFT) 50 MG tablet, Take 50 mg by mouth Daily., Disp: , Rfl:   •  tiotropium bromide-olodaterol (Stiolto Respimat) 2.5-2.5 MCG/ACT aerosol solution inhaler, Inhale 2 puffs Daily., Disp: 2 each, Rfl: 0  •  Xarelto 2.5 MG tablet, Take 2.5 mg by mouth 2 (Two) Times a Day With Meals. LAST DOSE A.M. 07/30/22 AS INSTRUCTED BY DR.  ELSHIEKH, Disp: , Rfl:   •  HYDROcodone-acetaminophen (NORCO) 5-325 MG per tablet, Take 1-2 tablets by mouth Every 4 (Four) Hours As Needed (Pain)., Disp: 20 tablet, Rfl: 0    No Known Allergies     Family History   Problem Relation Age of Onset   • Diabetes Sister    • Diabetes Brother    • Malig Hyperthermia Neg Hx   "      Social History     Socioeconomic History   • Marital status:    Tobacco Use   • Smoking status: Former Smoker     Packs/day: 1.00     Years: 45.00     Pack years: 45.00     Types: Cigarettes     Quit date: 2016     Years since quittin.7   • Smokeless tobacco: Never Used   Vaping Use   • Vaping Use: Never used   Substance and Sexual Activity   • Alcohol use: Never   • Drug use: Never   • Sexual activity: Defer       Vital Signs:   Resp 14   Ht 162.6 cm (64\")   Wt 128 kg (281 lb 3.2 oz)   BMI 48.27 kg/m²      Physical Exam     Result Review :   The following data was reviewed by: DANI Rivas on 2022:  Results for orders placed or performed in visit on 22   Bladder Scan   Result Value Ref Range    Volume 0ml    POC Urinalysis Dipstick, Automated    Specimen: Urine   Result Value Ref Range    Color Yellow Yellow, Straw, Dark Yellow, Carla    Clarity, UA Clear Clear    Specific Gravity  1.020 1.005 - 1.030    pH, Urine 5.5 5.0 - 8.0    Leukocytes Negative Negative    Nitrite, UA Negative Negative    Protein, POC Negative Negative mg/dL    Glucose, UA Negative Negative mg/dL    Ketones, UA Negative Negative    Urobilinogen, UA 0.2 E.U./dL Normal, 0.2 E.U./dL    Bilirubin Negative Negative    Blood, UA Trace (A) Negative    Lot Number [id number]     Expiration Date 10/2,023         Bladder Scan interpretation 2022    Estimation of residual urine via BVI 3000 Verathon Bladder Scan  MA/nurse performing: Janey FOREMAN MA   Residual Urine: 0 ml  Indication: Urge incontinence of urine   Position: Supine  Examination: Incremental scanning of the suprapubic area using 2.0 MHz transducer using copious amounts of acoustic gel.   Findings: An anechoic area was demonstrated which represented the bladder, with measurement of residual urine as noted. I inspected this myself. In that the residual urine was insignificant, refer to plan for treatment and plan necessary at this time. "             Procedures        Assessment and Plan    Diagnoses and all orders for this visit:    1. Urge incontinence of urine (Primary)  -     POC Urinalysis Dipstick, Automated  -     Bladder Scan     As she is happy with symptoms to date we will continue oxybutynin and f/u in 6 months or sooner if needed.        I spent  10 minutes caring for Chiara on this date of service. This time includes time spent by me in the following activities:reviewing tests, obtaining and/or reviewing a separately obtained history, performing a medically appropriate examination and/or evaluation , counseling and educating the patient/family/caregiver, ordering medications, tests, or procedures, and documenting information in the medical record  Follow Up   Return in about 6 months (around 3/30/2023) for With Dr. Alvarado for f/u OAB .  Patient was given instructions and counseling regarding her condition or for health maintenance advice. Please see specific information pulled into the AVS if appropriate.         This document has been electronically signed by DANI Rivas  September 30, 2022 11:22 EDT

## 2022-12-05 RX ORDER — CETIRIZINE HYDROCHLORIDE 10 MG/1
10 TABLET ORAL DAILY
Qty: 30 TABLET | Refills: 3 | Status: SHIPPED | OUTPATIENT
Start: 2022-12-05 | End: 2023-03-30

## 2022-12-29 ENCOUNTER — OFFICE VISIT (OUTPATIENT)
Dept: PULMONOLOGY | Facility: CLINIC | Age: 62
End: 2022-12-29

## 2022-12-29 VITALS
RESPIRATION RATE: 18 BRPM | DIASTOLIC BLOOD PRESSURE: 42 MMHG | SYSTOLIC BLOOD PRESSURE: 102 MMHG | WEIGHT: 290 LBS | OXYGEN SATURATION: 96 % | HEIGHT: 64 IN | HEART RATE: 64 BPM | TEMPERATURE: 98.4 F | BODY MASS INDEX: 49.51 KG/M2

## 2022-12-29 DIAGNOSIS — G47.33 OSA (OBSTRUCTIVE SLEEP APNEA): ICD-10-CM

## 2022-12-29 DIAGNOSIS — F17.210 SMOKING GREATER THAN 40 PACK YEARS: ICD-10-CM

## 2022-12-29 DIAGNOSIS — J43.9 PULMONARY EMPHYSEMA, UNSPECIFIED EMPHYSEMA TYPE: Primary | ICD-10-CM

## 2022-12-29 DIAGNOSIS — R91.1 LUNG NODULE: ICD-10-CM

## 2022-12-29 PROCEDURE — 99213 OFFICE O/P EST LOW 20 MIN: CPT | Performed by: NURSE PRACTITIONER

## 2022-12-29 RX ORDER — MOMETASONE FUROATE 1 MG/G
1 CREAM TOPICAL DAILY
COMMUNITY
Start: 2022-07-23

## 2022-12-29 NOTE — PROGRESS NOTES
Primary Care Provider  Jaleesa Piper APRN   Referring Provider  No ref. provider found    Patient Complaint  COPD, Lung Nodule, Follow-up, and Shortness of Breath      SUBJECTIVE    History of Presenting Illness  Chiara Zee is a pleasant 62 y.o. female who presents to [facility] for 6-month follow-up.  I last saw the patient 2022.  Since last visit patient has undergone robotic umbilical hernia repair back in August.  Overall patient states she is doing very well since her last visit she is not been on any steroids or antibiotics for her lungs.  She continues to be on albuterol inhaler and Stiolto.  Every now and then she states she does have shortness of air but she attributes this to being overweight.    Denies coughing, wheezing, headaches, chest pain, weight loss or hemoptysis. Denies fevers, chills and night sweats. Chiara Zee is able to perform ADLs without difficulties and denies any swollen glands/lymph nodes in the head or neck.    I have personally reviewed the review of systems, past family, social, medical and surgical histories; and agree with their findings.    Review of Systems   Constitutional: Negative.    HENT: Negative.    Eyes: Negative.    Respiratory: Negative.    Cardiovascular: Negative.    Musculoskeletal: Negative.    Skin: Negative.         Family History   Problem Relation Age of Onset   • Diabetes Sister    • Diabetes Brother    • Malig Hyperthermia Neg Hx         Social History     Socioeconomic History   • Marital status:    Tobacco Use   • Smoking status: Former     Packs/day: 1.00     Years: 45.00     Pack years: 45.00     Types: Cigarettes     Quit date:      Years since quittin.9   • Smokeless tobacco: Never   Vaping Use   • Vaping Use: Never used   Substance and Sexual Activity   • Alcohol use: Never   • Drug use: Never   • Sexual activity: Defer        Past Medical History:    Diagnosis Date   • Alpha-1-antitrypsin deficiency (HCC)    • COPD (chronic obstructive pulmonary disease) ([facility])     INHALERS PRN   • Coronary artery disease     FOLLOWS W/ELSHIEKH, NO C/O CP OR  SOA   • Hyperlipidemia    • Hypertension    • Myocardial infarction (HCC)     PT STATES UNKNOWN DATE   • Sleep apnea    • Umbilical hernia without obstruction and without gangrene         Immunization History   Administered Date(s) Administered   • COVID-19 (PFIZER) BIVALENT BOOSTER 12+YRS 10/27/2022   • COVID-19 (PFIZER) PURPLE CAP 03/20/2021, 04/17/2021, 02/08/2022   • Flu Vaccine Quad PF >36MO 10/28/2020, 10/28/2021   • Fluzone High Dose =>65 Years ([employer] ONLY) 09/28/2021   • Influenza, Unspecified 10/28/2022   • Pneumococcal Polysaccharide (PPSV23) 07/16/2020   • Shingrix 07/07/2021, 11/02/2021   • Tdap 07/16/2020       No Known Allergies       Current Outpatient Medications:   •  albuterol sulfate  (90 Base) MCG/ACT inhaler, Inhale 2 puffs Every 4 (Four) Hours As Needed for Wheezing., Disp: 1 g, Rfl: 11  •  atorvastatin (LIPITOR) 20 MG tablet, Take 20 mg by mouth Every Night., Disp: , Rfl:   •  busPIRone (BUSPAR) 10 MG tablet, Take 10 mg by mouth 3 (Three) Times a Day. TAKES FIRST DOSE LATER IN THE DAY, Disp: , Rfl:   •  cetirizine (zyrTEC) 10 MG tablet, Take 1 tablet by mouth Daily., Disp: 30 tablet, Rfl: 3  •  hydrOXYzine (ATARAX) 25 MG tablet, Take 25 mg by mouth Every 8 (Eight) Hours As Needed., Disp: , Rfl:   •  meloxicam (MOBIC) 15 MG tablet, Take 15 mg by mouth Daily., Disp: , Rfl:   •  metoprolol succinate XL (TOPROL-XL) 25 MG 24 hr tablet, Take 25 mg by mouth Every Night., Disp: , Rfl:   •  mometasone (ELOCON) 0.1 % cream, Apply 1 application topically to the appropriate area as directed Daily., Disp: , Rfl:   •  montelukast (SINGULAIR) 10 MG tablet, , Disp: , Rfl:   •  oxybutynin XL (DITROPAN-XL) 5 MG 24 hr tablet, Take 1 tablet by mouth Daily., Disp: 90 tablet, Rfl: 3  •  pantoprazole (PROTONIX) 40  "MG EC tablet, Take 40 mg by mouth Every Night., Disp: , Rfl:   •  Probiotic Product (PROBIOTIC PO), Take  by mouth., Disp: , Rfl:   •  sertraline (ZOLOFT) 50 MG tablet, Take 50 mg by mouth Daily., Disp: , Rfl:   •  tiotropium bromide-olodaterol (Stiolto Respimat) 2.5-2.5 MCG/ACT aerosol solution inhaler, Inhale 2 puffs Daily., Disp: 2 each, Rfl: 0  •  Xarelto 2.5 MG tablet, Take 2.5 mg by mouth 2 (Two) Times a Day With Meals. LAST DOSE A.M. 07/30/22 AS INSTRUCTED BY DR. COLLINS, Disp: , Rfl:   •  docusate sodium (Colace) 100 MG capsule, Take 1 capsule by mouth 2 (Two) Times a Day As Needed for Constipation., Disp: 10 capsule, Rfl: 1  •  HYDROcodone-acetaminophen (NORCO) 5-325 MG per tablet, Take 1-2 tablets by mouth Every 4 (Four) Hours As Needed (Pain)., Disp: 20 tablet, Rfl: 0       OBJECTIVE    Vital Signs   /42 (BP Location: Left arm, Patient Position: Sitting, Cuff Size: Adult)   Pulse 64   Temp 98.4 °F (36.9 °C) (Tympanic)   Resp 18   Ht 162.6 cm (64\")   Wt 132 kg (290 lb)   SpO2 96% Comment: room air  BMI 49.78 kg/m²     Physical Exam  Vitals reviewed.   Constitutional:       Appearance: Normal appearance.   HENT:      Head: Normocephalic and atraumatic.      Nose: Nose normal.      Mouth/Throat:      Mouth: Mucous membranes are moist.      Pharynx: Oropharynx is clear.   Eyes:      Extraocular Movements: Extraocular movements intact.      Conjunctiva/sclera: Conjunctivae normal.      Pupils: Pupils are equal, round, and reactive to light.   Cardiovascular:      Rate and Rhythm: Normal rate and regular rhythm.      Pulses: Normal pulses.      Heart sounds: Normal heart sounds.   Pulmonary:      Effort: Pulmonary effort is normal.      Breath sounds: Normal breath sounds.   Abdominal:      General: Bowel sounds are normal.   Musculoskeletal:         General: Normal range of motion.      Cervical back: Normal range of motion and neck supple.   Skin:     General: Skin is warm and dry. "   Neurological:      Mental Status: She is alert and oriented to person, place, and time.   Psychiatric:         Behavior: Behavior normal.          Results Review  I have personally reviewed the last CT from 2022 and diagnostics with the following  CT Chest Low Dose Cancer Screening WO [[id number]] (Order [id number])  Order  Status: Final result     Appointment Information    Display Notes    V-SC                   PACS Images     Radiology Images    Study Result    Narrative & Impression   PROCEDURE:  CT CHEST LOW DOSE CANCER SCREENING WO     COMPARISON: [facility], CT, CT CHEST LOW DOSE CANCER SCREENING WO,   2021, 16:04.     REASON FOR SCREENING:     Patient is 61 years of age, asymptomatic, and has a smoking history of more   than 30 pack years.  SMOKING STATUS:      Former smoker.  Years since quittin                 SCREENING VISIT:       Year 1.                   TECHNIQUE:    Axial unenhanced LDCT images from the apices through mid-kidney were obtained.   Evaluation of solid organs and vascular structures is suboptimal due to the lack of IV contrast.   Imaging was performed on a [employer] 128 CT scanner.     RADIATION:      CT Dose Index Vol (CTDIvol):      3.9  mGy               Dose Length Product (DLP):        148.9  mGy-cm     DIAGNOSTIC QUALITY:             Satisfactory     LUNG-RADS CLASSIFICATION:            1     FINDINGS:          Jessica/mediastinum:  No adenopathy.  Thoracic aorta normal in caliber with atherosclerotic   calcification.  Coronary artery calcification noted.  No pericardial effusion     Lungs/pleura:  Calcified granuloma in the right lower lobe.  No suspicious noncalcified pulmonary   nodule.  Parenchymal scarring in the right upper and lung bases.  Focal pleural thickening adjacent   to the posterior basilar right lower lobe, with subpleural atelectasis.     Upper abdomen:  Unremarkable     Bones/soft tissues:  No suspicious bone lesion      IMPRESSION:                    1. No suspicious pulmonary nodule     2. Postinflammatory pleuroparenchymal changes     3. Calcific coronary atherosclerosis     Lung rads category 1. Recommend continue annual low-dose CT lung screening in 12 months                MIKI MENDEZ MD         Electronically Signed and Approved By: MIIK MENDEZ MD on 6/22/2022 at 11:13            ECG 12 Lead: Patient Communication     Release   Not seen     Results  ECG 12 Lead (Order [id number])  Order-Level Documents:    [facility]20[facility]         Author: -- Service: -- Author Type: --   Filed:  Date of Service:  Creation Time:    Status: (Other)   HEART RATE= 63  bpm  RR Interval= 952  ms  ID Interval= 192  ms  P Horizontal Axis= 13  deg  P Front Axis= 32  deg  QRSD Interval= 105  ms  QT Interval= 469  ms  QRS Axis= 23  deg  T Wave Axis= 76  deg  - OTHERWISE NORMAL ECG -  Sinus rhythm  Low voltage, precordial leads  Abnormal R-wave progression, early transition  Electronically Signed By: Gonsalo Elizabeth ([facility]) 09-Aug-2022 05:32:55  Date and Time of Study: 2022-08-01 06:03:30        Signed    Electronically signed by Gonsalo Elizabeth MD on 8/9/22 at 0532 EDT           ASSESSMENT & PLAN    Patient Active Problem List   Diagnosis   • Allergic rhinitis   • Pulmonary emphysema (HCC)   • Gastroesophageal reflux disease   • Hypertension   • History of vaccination   • Hyperlipidemia   • Obstructive sleep apnea syndrome   • Smoking greater than 40 pack years   • Lung nodule   • Class 3 severe obesity due to excess calories without serious comorbidity with body mass index (BMI) of 40.0 to 44.9 in adult (HCC)   • Circadian rhythm sleep disorder, delayed sleep phase type   • Anxiety   • Arteriosclerosis of coronary artery   • Arthritis   • Carpal tunnel syndrome   • Chronic pleural effusion   • Depressive disorder   • Heart murmur   • History of tobacco abuse   • Limb swelling   • Myocardial infarction  (HCC)   • Heart attack (HCC)   • Pruritic dermatitis   • Rectal bleeding   • Seasonal allergies   • Personal history of transient ischemic attack (TIA), and cerebral infarction without residual deficits   • Umbilical hernia without obstruction and without gangrene       Diagnoses and all orders for this visit:    1. Pulmonary emphysema, unspecified emphysema type (HCC) (Primary)    2. Lung nodule    3. Smoking greater than 40 pack years  -      CT Chest Low Dose Cancer Screening WO; Future    4. SAMI (obstructive sleep apnea)           Plan:  At this time we will schedule patient for a low-dose lung cancer screening in June 2023.  Patient will follow back up in 6 months or sooner if needed.  Patient to continue with albuterol inhaler and Stiolto.  Patient states she does not need any refills at this time.  Patient to continue with her CPAP at recommended settings which is managed by Dr. Sparks at the sleep center.  Her DME company is [employer].    Smoking status:former  Vaccination status: Up to date  Medications personally reviewed    Follow Up  Return in about 6 months (around 6/29/2023).    Patient was given instructions and counseling regarding her condition or for health maintenance advice. Please see specific information pulled into the AVS if appropriate.

## 2023-01-27 ENCOUNTER — HOSPITAL ENCOUNTER (OUTPATIENT)
Dept: ULTRASOUND IMAGING | Facility: HOSPITAL | Age: 63
Discharge: HOME | End: 2023-01-27
Admitting: STUDENT IN AN ORGANIZED HEALTH CARE EDUCATION/TRAINING PROGRAM
Payer: COMMERCIAL

## 2023-01-27 ENCOUNTER — TRANSCRIBE ORDERS (OUTPATIENT)
Dept: ADMINISTRATIVE | Facility: HOSPITAL | Age: 63
End: 2023-01-27
Payer: COMMERCIAL

## 2023-01-27 DIAGNOSIS — R10.11 RUQ PAIN: ICD-10-CM

## 2023-01-27 DIAGNOSIS — R10.11 RUQ PAIN: Primary | ICD-10-CM

## 2023-01-27 PROCEDURE — 76700 US EXAM ABDOM COMPLETE: CPT

## 2023-02-23 ENCOUNTER — TELEPHONE (OUTPATIENT)
Dept: UROLOGY | Facility: CLINIC | Age: 63
End: 2023-02-23
Payer: COMMERCIAL

## 2023-02-23 NOTE — TELEPHONE ENCOUNTER
"LVM FOR PT TO CALL OFFICE BACK TO R/S APPT WITH DR. HAGER TO \"THIS KACIE PT CAN MOVE BACK TO HER SCHEDULE. THANKS!!\" PER LEANDRO/MS    "

## 2023-02-23 NOTE — TELEPHONE ENCOUNTER
----- Message from Harleen Santos sent at 2/23/2023  4:23 PM EST -----  Regarding: KACIE PT; CAN MOVE BACK  THIS KACIE PT CAN MOVE BACK TO HER SCHEDULE. THANKS!!

## 2023-03-28 ENCOUNTER — APPOINTMENT (OUTPATIENT)
Dept: CT IMAGING | Facility: HOSPITAL | Age: 63
End: 2023-03-28
Payer: COMMERCIAL

## 2023-03-28 ENCOUNTER — HOSPITAL ENCOUNTER (EMERGENCY)
Facility: HOSPITAL | Age: 63
Discharge: HOME | End: 2023-03-28
Attending: EMERGENCY MEDICINE | Admitting: EMERGENCY MEDICINE
Payer: COMMERCIAL

## 2023-03-28 VITALS
RESPIRATION RATE: 20 BRPM | WEIGHT: 292.33 LBS | DIASTOLIC BLOOD PRESSURE: 93 MMHG | TEMPERATURE: 98.3 F | SYSTOLIC BLOOD PRESSURE: 105 MMHG | OXYGEN SATURATION: 95 % | HEART RATE: 70 BPM | HEIGHT: 64 IN | BODY MASS INDEX: 49.91 KG/M2

## 2023-03-28 DIAGNOSIS — S09.90XA INJURY OF HEAD, INITIAL ENCOUNTER: Primary | ICD-10-CM

## 2023-03-28 LAB
HOLD SPECIMEN: NORMAL
HOLD SPECIMEN: NORMAL
WHOLE BLOOD HOLD COAG: NORMAL
WHOLE BLOOD HOLD SPECIMEN: NORMAL

## 2023-03-28 PROCEDURE — 70450 CT HEAD/BRAIN W/O DYE: CPT

## 2023-03-28 PROCEDURE — 36415 COLL VENOUS BLD VENIPUNCTURE: CPT

## 2023-03-28 PROCEDURE — 99283 EMERGENCY DEPT VISIT LOW MDM: CPT

## 2023-03-28 RX ORDER — SODIUM CHLORIDE 0.9 % (FLUSH) 0.9 %
10 SYRINGE (ML) INJECTION AS NEEDED
Status: DISCONTINUED | OUTPATIENT
Start: 2023-03-28 | End: 2023-03-28 | Stop reason: HOSPADM

## 2023-03-28 RX ORDER — BUTALBITAL, ACETAMINOPHEN AND CAFFEINE 300; 40; 50 MG/1; MG/1; MG/1
1 CAPSULE ORAL ONCE
Status: COMPLETED | OUTPATIENT
Start: 2023-03-28 | End: 2023-03-28

## 2023-03-28 RX ORDER — BUTALBITAL, ACETAMINOPHEN AND CAFFEINE 50; 325; 40 MG/1; MG/1; MG/1
1 TABLET ORAL EVERY 6 HOURS PRN
Qty: 12 TABLET | Refills: 0 | Status: SHIPPED | OUTPATIENT
Start: 2023-03-28

## 2023-03-28 RX ADMIN — BUTALBITAL, ACETAMINOPHEN AND CAFFEINE 1 CAPSULE: 300; 40; 50 CAPSULE ORAL at 17:09

## 2023-03-28 NOTE — ED NOTES
"Patient says she fell Sunday and hit her head on concrete.  Tripped over concrete ledge when letting out the dog.  Patient has bruising surrounding left eye and a small lac in the patient's left eyebrow and bruising to the left forehead. Patient states did not lose consciousness on fall but she was \"dazed\". Patient states she felt fine Sunday, and woke up this morning with a headache.  Patient states taking Xarelto.   Alert and oriented.   No s/s of distress at this time.     "

## 2023-03-28 NOTE — ED PROVIDER NOTES
"Time: 2:44 PM EDT  Date of encounter:  3/28/2023  Independent Historian/Clinical History and Information was obtained by:   Patient  Chief Complaint: Fall, Headache    History is limited by: N/A    History of Present Illness:  Patient is a 62 y.o. year old female who presents to the emergency department for evaluation headache post-fall 3 days ago. Patient states she fell  and hit her head on concrete. Patient did not go into the hospital then, stating she \"felt fine\" until today. She reports she developed a \"ferocious headache\" today. Patient has bruising surrounding left eye, left forehead and small laceration in the patient's left eyebrow. Patient denies LOC on fall but she was \"dazed\". Patient states she istaking Xarelto.      Patient Care Team  Primary Care Provider: Jaleesa Piper APRN    Past Medical History:     No Known Allergies  Past Medical History:   Diagnosis Date   • Alpha-1-antitrypsin deficiency (HCC)    • COPD (chronic obstructive pulmonary disease) (HCC)     INHALERS PRN   • Coronary artery disease     FOLLOWS W/ELSHIEKH, NO C/O CP OR  SOA   • Hyperlipidemia    • Hypertension    • Myocardial infarction (HCC)     PT STATES UNKNOWN DATE   • Sleep apnea    • Umbilical hernia without obstruction and without gangrene      Past Surgical History:   Procedure Laterality Date   •  SECTION     • OTHER SURGICAL HISTORY      right wrist fracture, plate and screws   • VENTRAL HERNIA REPAIR N/A 2022    Procedure: Robotic Umbilical Hernia Repair with Mesh Placement;  Surgeon: Epifanio Morales MD;  Location: [facility];  Service: Robotics - [facility];  Laterality: N/A;     Family History   Problem Relation Age of Onset   • Diabetes Sister    • Diabetes Brother    • Malig Hyperthermia Neg Hx        Home Medications:  Prior to Admission medications    Medication Sig Start Date End Date Taking? Authorizing Provider   albuterol sulfate  (90 Base) MCG/ACT inhaler Inhale 2 puffs Every 4 " (Four) Hours As Needed for Wheezing. 12/15/21   Hosea Garcia MD   atorvastatin (LIPITOR) 20 MG tablet Take 20 mg by mouth Every Night. 10/28/21   Sylvia Martinez MD   busPIRone (BUSPAR) 10 MG tablet Take 10 mg by mouth 3 (Three) Times a Day. TAKES FIRST DOSE LATER IN THE DAY 10/28/21   Sylvia Martinez MD   cetirizine (zyrTEC) 10 MG tablet Take 1 tablet by mouth Daily. 12/5/22   Dali Daniels APRN   docusate sodium (Colace) 100 MG capsule Take 1 capsule by mouth 2 (Two) Times a Day As Needed for Constipation. 8/1/22 8/1/23  Epifanio Morales MD   HYDROcodone-acetaminophen (NORCO) 5-325 MG per tablet Take 1-2 tablets by mouth Every 4 (Four) Hours As Needed (Pain). 8/1/22   Epifanio Morales MD   hydrOXYzine (ATARAX) 25 MG tablet Take 25 mg by mouth Every 8 (Eight) Hours As Needed. 9/22/22   Sylvia Martinez MD   meloxicam (MOBIC) 15 MG tablet Take 15 mg by mouth Daily. 11/29/21   Sylvia Martinez MD   metoprolol succinate XL (TOPROL-XL) 25 MG 24 hr tablet Take 25 mg by mouth Every Night.    Sylvia Martinez MD   mometasone (ELOCON) 0.1 % cream Apply 1 application topically to the appropriate area as directed Daily. 7/23/22   Sylvia Martinez MD   montelukast (SINGULAIR) 10 MG tablet  9/29/22   Sylvia Martinez MD   oxybutynin XL (DITROPAN-XL) 5 MG 24 hr tablet Take 1 tablet by mouth Daily. 7/28/22   Cinthya Nunn APRN   pantoprazole (PROTONIX) 40 MG EC tablet Take 40 mg by mouth Every Night. 10/28/21   Sylvia Martinez MD   Probiotic Product (PROBIOTIC PO) Take  by mouth.    Sylvia Martinez MD   sertraline (ZOLOFT) 50 MG tablet Take 50 mg by mouth Daily. 4/28/22   Sylvia Martinez MD   tiotropium bromide-olodaterol (Stiolto Respimat) 2.5-2.5 MCG/ACT aerosol solution inhaler Inhale 2 puffs Daily. 1/11/22   Hosea Garcia MD   Xarelto 2.5 MG tablet Take 2.5 mg by mouth 2 (Two) Times a Day With Meals. LAST DOSE A.M. 07/30/22 AS INSTRUCTED BY DR. COLLINS  "21   Provider, MD Sylvia        Social History:   Social History     Tobacco Use   • Smoking status: Former     Packs/day: 1.00     Years: 45.00     Pack years: 45.00     Types: Cigarettes     Quit date: 2016     Years since quittin.2   • Smokeless tobacco: Never   Vaping Use   • Vaping Use: Never used   Substance Use Topics   • Alcohol use: Never   • Drug use: Never         Review of Systems:  Review of Systems   Constitutional: Negative for chills and fever.   HENT: Negative for congestion, rhinorrhea and sore throat.    Eyes: Negative for pain and visual disturbance.   Respiratory: Negative for apnea, cough, chest tightness and shortness of breath.    Cardiovascular: Negative for chest pain and palpitations.   Gastrointestinal: Negative for abdominal pain, diarrhea, nausea and vomiting.   Genitourinary: Negative for difficulty urinating and dysuria.   Musculoskeletal: Negative for joint swelling and myalgias.   Skin: Positive for color change (bruising around the eyes).   Neurological: Positive for headaches. Negative for seizures.   Psychiatric/Behavioral: Negative.    All other systems reviewed and are negative.       Physical Exam:  /55   Pulse 67   Temp 98.3 °F (36.8 °C) (Oral)   Resp 20   Ht 162.6 cm (64\")   Wt 133 kg (292 lb 5.3 oz)   SpO2 97%   BMI 50.18 kg/m²     Physical Exam  Vitals and nursing note reviewed.   Constitutional:       General: She is not in acute distress.     Appearance: Normal appearance. She is not toxic-appearing.   HENT:      Head: Normocephalic. Raccoon eyes and contusion (Frontal) present. No right periorbital erythema or left periorbital erythema.      Jaw: There is normal jaw occlusion.      Comments: Bilateral periorbital ecchymosis  Eyes:      General: Lids are normal.      Extraocular Movements: Extraocular movements intact.      Conjunctiva/sclera: Conjunctivae normal.      Pupils: Pupils are equal, round, and reactive to light.   Cardiovascular:    "   Rate and Rhythm: Normal rate and regular rhythm.      Pulses: Normal pulses.      Heart sounds: Normal heart sounds.   Pulmonary:      Effort: Pulmonary effort is normal. No respiratory distress.      Breath sounds: Normal breath sounds. No wheezing or rhonchi.   Abdominal:      General: Abdomen is flat.      Palpations: Abdomen is soft.      Tenderness: There is no abdominal tenderness. There is no guarding or rebound.   Musculoskeletal:         General: Normal range of motion.      Cervical back: Normal range of motion and neck supple.      Right lower leg: No edema.      Left lower leg: No edema.   Skin:     General: Skin is warm and dry.   Neurological:      Mental Status: She is alert and oriented to person, place, and time. Mental status is at baseline.   Psychiatric:         Mood and Affect: Mood normal.                  Procedures:  Procedures      Medical Decision Making:      Comorbidities that affect care:    Hyperlipidemia, History of MI, Congestive Heart Failure, COPD, Coronary Artery Disease, Hypertension    External Notes reviewed:    Hospital Discharge Summary: (15:00 ET) - Admission in 2021 for CHF and respiratory failure.      The following orders were placed and all results were independently analyzed by me:  Orders Placed This Encounter   Procedures   • CT Head Without Contrast   • [place] Draw   • Insert peripheral IV   • Green Top (Gel)   • Lavender Top   • Gold Top - SST   • Light Blue Top       Medications Given in the Emergency Department:  Medications   sodium chloride 0.9 % flush 10 mL (has no administration in time range)   butalbital-acetaminophen-caffeine (ORBIVAN) -40 MG capsule 1 capsule (has no administration in time range)        ED Course:         Labs:    Lab Results (last 24 hours)     ** No results found for the last 24 hours. **           Imaging:    CT Head Without Contrast    Result Date: 3/28/2023  PROCEDURE: CT HEAD WO CONTRAST  COMPARISON:  [facility]  [facility], CT, CTA HEAD W/WO CONT POST PROC, 7/02/2019, 15:20. INDICATIONS: headache, fell 4 days ago  PROTOCOL:   Standard imaging protocol performed    RADIATION:   DLP: 1018.2mGy*cm   MA and/or KV was adjusted to minimize radiation dose.     TECHNIQUE: After obtaining the patient's consent, CT images were obtained without non-ionic intravenous contrast material.  FINDINGS:  There is no evidence of acute infarct or hemorrhage.  No abnormal extra-axial fluid collections are identified.  No mass effect or hydrocephalus.  Visualized paranasal sinuses and mastoid air cells are clear.  No acute skull fracture is seen.  Globes and orbits are within normal limits.        1. No acute intracranial abnormality.     MEÑO SEPULVEDA MD       Electronically Signed and Approved By: MEÑO SEPULVEDA MD on 3/28/2023 at 16:53                 Differential Diagnosis and Discussion:    Headache: Differential diagnosis includes but is not limited to migraine, cluster headache, hypertension, tumor, subarachnoid bleeding, pseudotumor cerebri, temporal arteritis, infections, tension headache, and TMJ syndrome.    CT scan radiology interpretation was reviewed by me.    MDM  Number of Diagnoses or Management Options  Injury of head, initial encounter  Diagnosis management comments: The patient presents with an acute closed head injury. Vian Criteria for CT scan was followed. CT of the head is required for patients with minor head injury and any one of the following (including a GCS of 15):     1.                     Headache   2.                     Vomiting   3.                     Older than 60 years   4.                     Drug or Alcohol intoxication   5.                     Persistent anterograde amnesia   6.                     Visible trauma above the clavicle   7.                     Seizure.      The CT scan of the head shows no acute intracranial abnormalities. This includes subarachnoid hemorrhage, subdural hematoma,  epidural hematoma, or calvarial fractures. The patient is neurologically intact, has a normal mental status and is ambulatory in the ED. The patient has had no seizure like activity in the ED. The vital signs have been stable. The patient's condition is stable and appropriate for discharge. The patient made aware of the symptoms of post-concussive syndrome. The patient was counseled to return to the ED for motor or sensory deficit, altered mental status, uncontrollable headache, visual changes, seizures, or for any re-examination of new symptoms. The patient will pursue further outpatient evaluation with the primary care physician or other designated or consulting position as indicated in the discharge instructions as a follow up.          Amount and/or Complexity of Data Reviewed  Tests in the radiology section of CPT®: reviewed  Review and summarize past medical records: yes  Independent visualization of images, tracings, or specimens: yes    Risk of Complications, Morbidity, and/or Mortality  Presenting problems: moderate  Management options: moderate    Patient Progress  Patient progress: stable           Patient Care Considerations:    I considered ordering blood work, however the patient is well-appearing.      Consultants/Shared Management Plan:    None    Social Determinants of Health:    Patient is independent, reliable, and has access to care.       Disposition and Care Coordination:    Discharged: The patient is suitable and stable for discharge with no need for consideration of observation or admission.    I have explained the patient´s condition, diagnoses and treatment plan based on the information available to me at this time. I have answered questions and addressed any concerns. The patient has a good  understanding of the patient´s diagnosis, condition, and treatment plan as can be expected at this point. The vital signs have been stable. The patient´s condition is stable and appropriate for  discharge from the emergency department.      The patient will pursue further outpatient evaluation with the primary care physician or other designated or consulting physician as outlined in the discharge instructions. They are agreeable to this plan of care and follow-up instructions have been explained in detail. The patient has received these instructions in written format and have expressed an understanding of the discharge instructions. The patient is aware that any significant change in condition or worsening of symptoms should prompt an immediate return to this or the closest emergency department or call to 911.  I have explained discharge medications and the need for follow up with the patient/caretakers. This was also printed in the discharge instructions. Patient was discharged with the following medications and follow up:      Medication List      No changes were made to your prescriptions during this visit.      Jaleesa Piper, APRN  [address] [facility] [address]  [facility] [address]  [phone]             Final diagnoses:   Injury of head, initial encounter        ED Disposition     ED Disposition   Discharge    Condition   Stable    Comment   --           Documentation assistance provided by Henry Coubrn acting as scribe for Peg Arguello MD. Information recorded by the scribe was done at my direction and has been verified and validated by me.     This medical record created using voice recognition software.           Henry Coburn  03/28/23 [id number]       Rupa Ott  03/28/23 1627       Peg Arguello MD  03/28/23 [id number]

## 2023-03-30 RX ORDER — CETIRIZINE HYDROCHLORIDE 10 MG/1
TABLET ORAL
Qty: 30 TABLET | Refills: 3 | Status: SHIPPED | OUTPATIENT
Start: 2023-03-30

## 2023-04-13 ENCOUNTER — OFFICE VISIT (OUTPATIENT)
Dept: UROLOGY | Facility: CLINIC | Age: 63
End: 2023-04-13
Payer: COMMERCIAL

## 2023-04-13 VITALS — HEIGHT: 69 IN | BODY MASS INDEX: 43.4 KG/M2 | RESPIRATION RATE: 12 BRPM | WEIGHT: 293 LBS

## 2023-04-13 DIAGNOSIS — N39.41 URGE INCONTINENCE OF URINE: Primary | ICD-10-CM

## 2023-04-13 LAB
BILIRUB BLD-MCNC: NEGATIVE MG/DL
CLARITY, POC: CLEAR
COLOR UR: YELLOW
EXPIRATION DATE: NORMAL
GLUCOSE UR STRIP-MCNC: NEGATIVE MG/DL
KETONES UR QL: NEGATIVE
LEUKOCYTE EST, POC: NEGATIVE
Lab: NORMAL
NITRITE UR-MCNC: NEGATIVE MG/ML
PH UR: 5.5 [PH] (ref 5–8)
PROT UR STRIP-MCNC: NEGATIVE MG/DL
RBC # UR STRIP: NEGATIVE /UL
SP GR UR: 1.03 (ref 1–1.03)
URINE VOLUME: 0
UROBILINOGEN UR QL: NORMAL

## 2023-04-13 PROCEDURE — 87086 URINE CULTURE/COLONY COUNT: CPT | Performed by: NURSE PRACTITIONER

## 2023-04-13 PROCEDURE — 1160F RVW MEDS BY RX/DR IN RCRD: CPT | Performed by: NURSE PRACTITIONER

## 2023-04-13 PROCEDURE — 1159F MED LIST DOCD IN RCRD: CPT | Performed by: NURSE PRACTITIONER

## 2023-04-13 PROCEDURE — 99214 OFFICE O/P EST MOD 30 MIN: CPT | Performed by: NURSE PRACTITIONER

## 2023-04-13 PROCEDURE — 51798 US URINE CAPACITY MEASURE: CPT | Performed by: NURSE PRACTITIONER

## 2023-04-13 NOTE — PROGRESS NOTES
Chief Complaint: Urinary Incontinence (Pt here for follow up.  Pt states medication is not helping.)    Subjective         History of Present Illness  Chiara Zee is a 62 y.o. female presents to [facility] to be seen for f/u Urinary incontinence.    She has been on oxybutynin 5 mg daily for the last 8 months to see if this would help to alleviate her urgency and frequency as well as urge urinary incontinence.    She states her symptoms worsened in the last month.     She states she is still with urge incontinence.    She has had more falls recently.    She feels as if she has had some cognitive decline and skin itching.      The patient has continued to decrease caffeine intake.    Previous:  At last visit we discussed that her Caffeine intake may be contributing to her symptoms.    She has cut out caffeine and is only drinking pink lemonade.    She states that her leakage has improved but not gone entirely.      The patient states that she will not have the urge to go and will stand up then will have full bladder leakage and at times will get the urge to go but cannot make it.    She drinks coffee 20 oz and pepsi zero x2 daily even during the night.     She has nocturia x 1 rarely 2x     She voids every hour during the day.    She does have leakage with cough laugh and sneeze, but not as bothersome.    She has not had  Hysterectomy.    Objective     Past Medical History:   Diagnosis Date   • Alpha-1-antitrypsin deficiency    • COPD (chronic obstructive pulmonary disease)     INHALERS PRN   • Coronary artery disease     FOLLOWS W/ELSHIEKH, NO C/O CP OR  SOA   • Hyperlipidemia    • Hypertension    • Myocardial infarction     PT STATES UNKNOWN DATE   • Sleep apnea    • Umbilical hernia without obstruction and without gangrene        Past Surgical History:   Procedure Laterality Date   •  SECTION     • OTHER SURGICAL HISTORY      right wrist fracture, plate and screws   •  VENTRAL HERNIA REPAIR N/A 8/1/2022    Procedure: Robotic Umbilical Hernia Repair with Mesh Placement;  Surgeon: Epifanio Morales MD;  Location: [facility] MAIN OR;  Service: Robotics - [facility];  Laterality: N/A;         Current Outpatient Medications:   •  albuterol sulfate  (90 Base) MCG/ACT inhaler, Inhale 2 puffs Every 4 (Four) Hours As Needed for Wheezing., Disp: 1 g, Rfl: 11  •  atorvastatin (LIPITOR) 20 MG tablet, Take 1 tablet by mouth Every Night., Disp: , Rfl:   •  busPIRone (BUSPAR) 10 MG tablet, Take 1 tablet by mouth 3 (Three) Times a Day. TAKES FIRST DOSE LATER IN THE DAY, Disp: , Rfl:   •  butalbital-acetaminophen-caffeine (FIORICET, ESGIC) -40 MG per tablet, Take 1 tablet by mouth Every 6 (Six) Hours As Needed for Headache., Disp: 12 tablet, Rfl: 0  •  cetirizine (zyrTEC) 10 MG tablet, TAKE ONE TABLET BY MOUTH EVERY DAY, Disp: 30 tablet, Rfl: 3  •  docusate sodium (Colace) 100 MG capsule, Take 1 capsule by mouth 2 (Two) Times a Day As Needed for Constipation., Disp: 10 capsule, Rfl: 1  •  HYDROcodone-acetaminophen (NORCO) 5-325 MG per tablet, Take 1-2 tablets by mouth Every 4 (Four) Hours As Needed (Pain)., Disp: 20 tablet, Rfl: 0  •  hydrOXYzine (ATARAX) 25 MG tablet, Take 1 tablet by mouth Every 8 (Eight) Hours As Needed., Disp: , Rfl:   •  meloxicam (MOBIC) 15 MG tablet, Take 1 tablet by mouth Daily., Disp: , Rfl:   •  metoprolol succinate XL (TOPROL-XL) 25 MG 24 hr tablet, Take 1 tablet by mouth Every Night., Disp: , Rfl:   •  mometasone (ELOCON) 0.1 % cream, Apply 1 application topically to the appropriate area as directed Daily., Disp: , Rfl:   •  montelukast (SINGULAIR) 10 MG tablet, , Disp: , Rfl:   •  pantoprazole (PROTONIX) 40 MG EC tablet, Take 1 tablet by mouth Every Night., Disp: , Rfl:   •  Probiotic Product (PROBIOTIC PO), Take  by mouth., Disp: , Rfl:   •  sertraline (ZOLOFT) 50 MG tablet, Take 1 tablet by mouth Daily., Disp: , Rfl:   •  tiotropium bromide-olodaterol  "(Stiolto Respimat) 2.5-2.5 MCG/ACT aerosol solution inhaler, Inhale 2 puffs Daily., Disp: 2 each, Rfl: 0  •  Xarelto 2.5 MG tablet, Take 1 tablet by mouth 2 (Two) Times a Day With Meals. LAST DOSE A.M. 22 AS INSTRUCTED BY DR. COLLINS, Disp: , Rfl:   •  Mirabegron ER (Myrbetriq) 25 MG tablet sustained-release 24 hour 24 hr tablet, Take 1 tablet by mouth Daily., Disp: 90 tablet, Rfl: 3    No Known Allergies     Family History   Problem Relation Age of Onset   • Diabetes Sister    • Diabetes Brother    • Malig Hyperthermia Neg Hx        Social History     Socioeconomic History   • Marital status:    Tobacco Use   • Smoking status: Former     Packs/day: 1.00     Years: 45.00     Pack years: 45.00     Types: Cigarettes     Quit date:      Years since quittin.2   • Smokeless tobacco: Never   Vaping Use   • Vaping Use: Never used   Substance and Sexual Activity   • Alcohol use: Never   • Drug use: Never   • Sexual activity: Defer       Vital Signs:   Resp 12   Ht 175.3 cm (69\")   Wt 136 kg (299 lb 3.2 oz)   BMI 44.18 kg/m²      Physical Exam     Result Review :   The following data was reviewed by: DANI Rivas on 2023:  Results for orders placed or performed in visit on 23   Bladder Scan   Result Value Ref Range    Urine Volume 0    POC Urinalysis Dipstick, Automated    Specimen: Urine   Result Value Ref Range    Color Yellow Yellow, Straw, Dark Yellow, Carla    Clarity, UA Clear Clear    Specific Gravity  1.030 1.005 - 1.030    pH, Urine 5.5 5.0 - 8.0    Leukocytes Negative Negative    Nitrite, UA Negative Negative    Protein, POC Negative Negative mg/dL    Glucose, UA Negative Negative mg/dL    Ketones, UA Negative Negative    Urobilinogen, UA Normal Normal, 0.2 E.U./dL    Bilirubin Negative Negative    Blood, UA Negative Negative    Lot Number [id number]     Expiration Date          Bladder Scan interpretation 2023    Estimation of residual urine via BVI 3000 " Sujit Bladder Scan  MA/nurse performing: Janey FOREMAN MA   Residual Urine: 0 ml  Indication: Urge incontinence of urine   Position: Supine  Examination: Incremental scanning of the suprapubic area using 2.0 MHz transducer using copious amounts of acoustic gel.   Findings: An anechoic area was demonstrated which represented the bladder, with measurement of residual urine as noted. I inspected this myself. In that the residual urine was insignificant, refer to plan for treatment and plan necessary at this time.             Procedures        Assessment and Plan    Diagnoses and all orders for this visit:    1. Urge incontinence of urine (Primary)  -     POC Urinalysis Dipstick, Automated  -     Bladder Scan  -     Urine Culture - Urine, Urine, Clean Catch; Future  -     Mirabegron ER (Myrbetriq) 25 MG tablet sustained-release 24 hour 24 hr tablet; Take 1 tablet by mouth Daily.  Dispense: 90 tablet; Refill: 3        Given increase in symptoms we will stop her oxybutynin as I feel the anticholinergic side effects are not safe for her to continue this drug class.    We will send her urine for cx today as well.      I spent  10 minutes caring for Chiara on this date of service. This time includes time spent by me in the following activities:reviewing tests, obtaining and/or reviewing a separately obtained history, performing a medically appropriate examination and/or evaluation , counseling and educating the patient/family/caregiver, ordering medications, tests, or procedures, and documenting information in the medical record  Follow Up   Return in about 3 months (around 7/13/2023) for f/u OAB .  Patient was given instructions and counseling regarding her condition or for health maintenance advice. Please see specific information pulled into the AVS if appropriate.         This document has been electronically signed by DANI Rivas  April 13, 2023 09:14 EDT

## 2023-04-14 LAB — BACTERIA SPEC AEROBE CULT: NORMAL

## 2023-04-26 NOTE — PROGRESS NOTES
Occupational Therapy Daily Treatment Note      Patient: Chiara Zee   : 1960  Referring practitioner: Scott Catalan MD  Date of Initial Visit: Type: THERAPY  Noted: 2021  Today's Date: 8/3/2021  Patient seen for 8 sessions         Chiara Zee reports: no pain today, it is still swollen.        Subjective     Objective   See Exercise, Manual, and Modality Logs for complete treatment.       Assessment/Plan    Visit Diagnoses:    ICD-10-CM ICD-9-CM   1. Closed extra-articular fracture of distal end of right radius with routine healing, subsequent encounter  S52.551D V54.12   2. Closed displaced fracture of styloid process of right ulna with routine healing, subsequent encounter  S52.611D V54.12   3. Right wrist pain  M25.531 719.43       Continue per POC         Timed:  Manual Therapy:    8     mins  57194;  Therapeutic Exercise:    15     mins  52073;       Untimed:  Electrical Stimulation:    0     mins  85177 ( );  Fluidotherapy        10    mins  14353    Timed Treatment:   23   mins   Total Treatment:     33   min    Ana Giraldo OTR/L  Occupational Therapist   Birth Control Pills Pregnancy And Lactation Text: This medication should be avoided if pregnant and for the first 30 days post-partum.

## 2023-05-12 ENCOUNTER — TRANSCRIBE ORDERS (OUTPATIENT)
Dept: ADMINISTRATIVE | Facility: HOSPITAL | Age: 63
End: 2023-05-12
Payer: COMMERCIAL

## 2023-05-12 DIAGNOSIS — Z78.0 MENOPAUSE: Primary | ICD-10-CM

## 2023-05-12 DIAGNOSIS — Z12.31 ENCOUNTER FOR SCREENING MAMMOGRAM FOR MALIGNANT NEOPLASM OF BREAST: ICD-10-CM

## 2023-05-24 ENCOUNTER — OFFICE VISIT (OUTPATIENT)
Dept: SLEEP MEDICINE | Facility: HOSPITAL | Age: 63
End: 2023-05-24
Payer: COMMERCIAL

## 2023-05-24 VITALS
WEIGHT: 293 LBS | BODY MASS INDEX: 50.02 KG/M2 | DIASTOLIC BLOOD PRESSURE: 46 MMHG | HEART RATE: 66 BPM | OXYGEN SATURATION: 94 % | SYSTOLIC BLOOD PRESSURE: 106 MMHG | HEIGHT: 64 IN

## 2023-05-24 DIAGNOSIS — G47.33 OSA ON CPAP: Primary | ICD-10-CM

## 2023-05-24 DIAGNOSIS — G47.21 CIRCADIAN RHYTHM SLEEP DISORDER, DELAYED SLEEP PHASE TYPE: ICD-10-CM

## 2023-05-24 DIAGNOSIS — E66.01 CLASS 3 SEVERE OBESITY DUE TO EXCESS CALORIES WITHOUT SERIOUS COMORBIDITY WITH BODY MASS INDEX (BMI) OF 40.0 TO 44.9 IN ADULT: ICD-10-CM

## 2023-05-24 DIAGNOSIS — Z99.89 OSA ON CPAP: Primary | ICD-10-CM

## 2023-05-24 PROCEDURE — G0463 HOSPITAL OUTPT CLINIC VISIT: HCPCS

## 2023-05-24 RX ORDER — FLUOXETINE HYDROCHLORIDE 20 MG/1
1 CAPSULE ORAL DAILY
COMMUNITY
Start: 2023-04-26

## 2023-05-24 NOTE — PROGRESS NOTES
"  [address] [address]  [place]   [place] [address]  Phone: [phone]  Fax: [phone]      SLEEP CLINIC FOLLOW UP PROGRESS NOTE.    Chiara Zee  [id number]   1960  62 y.o.  female      PCP: Jaleesa Piper APRN      Date of visit: 5/24/2023    Chief Complaint   Patient presents with   • Sleep Apnea   • Obesity       HPI:  This is a 62 y.o. years old patient is here for the management of obstructive sleep apnea.  Sleep apnea is moderate in severity with a AHI of 28/hr. Patient is using positive airway pressure therapy with CPAP 12 and the symptoms of sleep apnea have improved significantly on the therapy. Normally patient goes to bed at 5 AM and wakes up at 11 AM .  The patient wakes up 2 time(s) during the night and has no problem going back to sleep.  Feels refreshed after waking up.     She is retired and has gotten into the habit of staying up all night.  She is using the CPAP and use using on a regular basis.  She wanted to see whether she is a candidate for inspire but her body mass index is too high at 51 she is not a candidate for inspire.    Medications and allergies are reviewed by me and documented in the encounter.     SOCIAL (habits pertaining to sleep medicine)  • History tobacco use:No   • History of alcohol use: 0 per week  • Caffeine use: 3     REVIEW OF SYSTEMS:   Pertaining positive symptoms are:  • Sidell Sleepiness Scale :    • Anxiety  • Depression      PHYSICAL EXAMINATION:  CONSTITUTIONAL:  Vitals:    05/24/23 1300   BP: 106/46   Pulse: 66   SpO2: 94%   Weight: 135 kg (297 lb 6.4 oz)   Height: 162.6 cm (64.02\")    Body mass index is 51.02 kg/m².   NOSE: nasal passages are clear, No deformities noted   RESP SYSTEM: Not in any respiratory distress, no chest deformities noted,   CARDIOVASULAR: No edema noted  NEURO: Oriented x 3, gait normal,  Mood and affect appeared appropriate      Data reviewed:  The Smart card downloaded on 5/24/2023 " has been reviewed independently by me for compliance and discussed the data with the patient.   Compliance; 100%  More than 4 hr use, 100%  Average use of the device 7 hours and 35 minutes per night  Residual AHI: 1.3 /hr (goal < 5.0 /hr)  Mask type: Nasal pillow  Device: ResMed  DME: [facility]      ASSESSMENT AND PLAN:  · Obstructive sleep apnea ( G 47.33).  The symptoms of sleep apnea have improved with the device and the treatment.  Patient's compliance with the device is excellent for treatment of sleep apnea.  I have independently reviewed the smart card down load and discussed with the patient the download data and encouarged the patient to continue to use the device.The residual AHI is acceptable. The device is benefiting the patient and the device is medically necessary.  Without proper control of sleep apnea and good compliance there is a increased risk for hypertension, diabetes mellitus and nonrestorative sleep with hypersomnia which can increase risk for motor vehicle accidents.  Untreated sleep apnea is also a risk factor for development of atrial fibrillation, pulmonary hypertension, insulin resistance and stroke. The patient is also instructed to get the supplies from the [facility] and and change them on a regular basis.  A prescription for supplies has been sent to the [facility].  I have also discussed the good sleep hygiene habits and adequate amount of sleep needed for good health.  · Obesity  3 with BMI is Body mass index is 51.02 kg/m².. I have discuss the relationship between the weight and sleep apnea. The benefit of weight loss in reducing severity of sleep apnea was discussed. Discussed diet and exercise with the patient to achieve ideal BMI.  · Delayed sleep phase syndrome.  Again I have educated the patient about the syndrome and she needs to get back on a regular sleep schedule.  · Return in about 1 year (around 5/24/2024) for with smart card down load. . Patient's questions were  answered.    5/24/2023  Noa Sparks MD  Sleep Medicine.  Medical Director,   [facility], [facility] sleep [facility].

## 2023-07-13 PROBLEM — Z78.0 POST-MENOPAUSE: Status: ACTIVE | Noted: 2023-04-27

## 2023-07-14 PROBLEM — R11.0 NAUSEA: Status: ACTIVE | Noted: 2023-07-14

## 2023-07-14 PROBLEM — R68.81 EARLY SATIETY: Status: ACTIVE | Noted: 2023-07-14

## 2023-07-24 ENCOUNTER — NUTRITION (OUTPATIENT)
Dept: DIABETES SERVICES | Facility: HOSPITAL | Age: 63
End: 2023-07-24
Payer: COMMERCIAL

## 2023-07-24 DIAGNOSIS — K76.0 FATTY LIVER: Primary | ICD-10-CM

## 2023-07-24 PROCEDURE — 97802 MEDICAL NUTRITION INDIV IN: CPT | Performed by: DIETITIAN, REGISTERED

## 2023-08-08 ENCOUNTER — HOSPITAL ENCOUNTER (OUTPATIENT)
Dept: CARDIOLOGY | Facility: HOSPITAL | Age: 63
Discharge: HOME | End: 2023-08-08
Admitting: SPECIALIST
Payer: COMMERCIAL

## 2023-08-08 DIAGNOSIS — R09.89 BRUIT: ICD-10-CM

## 2023-08-08 LAB
BH CV XLRA MEAS CAROTID RIGHT ICA/CCA DIASTOLIC RATIO: 1.15
BH CV XLRA MEAS LEFT DIST CCA EDV: 19.6 CM/SEC
BH CV XLRA MEAS LEFT DIST CCA PSV: 77.1 CM/SEC
BH CV XLRA MEAS LEFT DIST ICA EDV: -18.9 CM/SEC
BH CV XLRA MEAS LEFT DIST ICA PSV: -76.4 CM/SEC
BH CV XLRA MEAS LEFT ICA/CCA DIASTOLIC RATIO: 1.86
BH CV XLRA MEAS LEFT ICA/CCA RATIO: 1.52
BH CV XLRA MEAS LEFT MID ICA EDV: -32.2 CM/SEC
BH CV XLRA MEAS LEFT MID ICA PSV: -121 CM/SEC
BH CV XLRA MEAS LEFT PROX CCA EDV: 17.5 CM/SEC
BH CV XLRA MEAS LEFT PROX CCA PSV: 96.7 CM/SEC
BH CV XLRA MEAS LEFT PROX ECA EDV: -11.2 CM/SEC
BH CV XLRA MEAS LEFT PROX ECA PSV: -89.7 CM/SEC
BH CV XLRA MEAS LEFT PROX ICA EDV: 36.4 CM/SEC
BH CV XLRA MEAS LEFT PROX ICA PSV: 117 CM/SEC
BH CV XLRA MEAS LEFT VERTEBRAL A EDV: -16.8 CM/SEC
BH CV XLRA MEAS LEFT VERTEBRAL A PSV: -63.8 CM/SEC
BH CV XLRA MEAS RIGHT DIST CCA EDV: 15.7 CM/SEC
BH CV XLRA MEAS RIGHT DIST CCA PSV: 112.1 CM/SEC
BH CV XLRA MEAS RIGHT DIST ICA EDV: -16.8 CM/SEC
BH CV XLRA MEAS RIGHT DIST ICA PSV: -88.2 CM/SEC
BH CV XLRA MEAS RIGHT ICA/CCA RATIO: 0.79
BH CV XLRA MEAS RIGHT MID ICA EDV: -22.4 CM/SEC
BH CV XLRA MEAS RIGHT MID ICA PSV: -99.4 CM/SEC
BH CV XLRA MEAS RIGHT PROX CCA EDV: 14.9 CM/SEC
BH CV XLRA MEAS RIGHT PROX CCA PSV: 119.9 CM/SEC
BH CV XLRA MEAS RIGHT PROX ECA EDV: -13.3 CM/SEC
BH CV XLRA MEAS RIGHT PROX ECA PSV: -115.6 CM/SEC
BH CV XLRA MEAS RIGHT PROX ICA EDV: -18 CM/SEC
BH CV XLRA MEAS RIGHT PROX ICA PSV: -88.8 CM/SEC
BH CV XLRA MEAS RIGHT VERTEBRAL A EDV: -14 CM/SEC
BH CV XLRA MEAS RIGHT VERTEBRAL A PSV: -79.9 CM/SEC
LEFT ARM BP: NORMAL MMHG
RIGHT ARM BP: NORMAL MMHG

## 2023-08-08 PROCEDURE — 93880 EXTRACRANIAL BILAT STUDY: CPT | Performed by: SURGERY

## 2023-08-08 PROCEDURE — 93880 EXTRACRANIAL BILAT STUDY: CPT

## 2023-08-17 ENCOUNTER — HOSPITAL ENCOUNTER (OUTPATIENT)
Dept: MAMMOGRAPHY | Facility: HOSPITAL | Age: 63
Discharge: HOME | End: 2023-08-17
Payer: COMMERCIAL

## 2023-08-17 ENCOUNTER — HOSPITAL ENCOUNTER (OUTPATIENT)
Dept: BONE DENSITY | Facility: HOSPITAL | Age: 63
Discharge: HOME | End: 2023-08-17
Payer: COMMERCIAL

## 2023-08-17 VITALS — HEIGHT: 64 IN | BODY MASS INDEX: 50.02 KG/M2 | WEIGHT: 293 LBS

## 2023-08-17 DIAGNOSIS — Z78.0 MENOPAUSE: ICD-10-CM

## 2023-08-17 DIAGNOSIS — Z12.31 ENCOUNTER FOR SCREENING MAMMOGRAM FOR MALIGNANT NEOPLASM OF BREAST: ICD-10-CM

## 2023-08-17 PROCEDURE — 77080 DXA BONE DENSITY AXIAL: CPT

## 2023-08-17 PROCEDURE — 77067 SCR MAMMO BI INCL CAD: CPT

## 2023-08-17 PROCEDURE — 77063 BREAST TOMOSYNTHESIS BI: CPT

## 2023-08-17 NOTE — PROGRESS NOTES
"  Chiara Zee is a 63 y.o. female who presents to [facility] for nutrition consult r/t diagnosis of fatty liver.  Chiara Zee is referred by DANI Palencia.    Past Medical History:   Diagnosis Date    Alpha-1-antitrypsin deficiency     COPD (chronic obstructive pulmonary disease)     INHALERS PRN    Coronary artery disease     FOLLOWS W/ELSHIEKH, NO C/O CP OR  SOA    Hyperlipidemia     Hypertension     Myocardial infarction     PT STATES UNKNOWN DATE    Sleep apnea     Umbilical hernia without obstruction and without gangrene        Anthropometrics    162.6 cm (64\")  134 kg (295 lb 6.7 oz)  50.71 kg/mý    Nutrition Information    Nutrition Information  Enter everything you can remember eating in the last 24 hours (1 day): breakfast- skips meal; midday/evening- ham and cheese sandwich w/ chips/pork chops w/ baked potato and vegetables; snacks- some sweets, fruits; beverages- Pepsi Zero, coffee  What is the biggest challenge you have with your diet?: Knowledge    Medications    Current Outpatient Medications   Medication    albuterol sulfate HFA    atorvastatin    busPIRone    butalbital-acetaminophen-caffeine    cetirizine    FLUoxetine    HYDROcodone-acetaminophen    hydrOXYzine    meloxicam    metoprolol succinate XL    metoprolol tartrate    mometasone    montelukast    ondansetron ODT    pantoprazole    sertraline    tiotropium bromide-olodaterol    Gemtesa    Vibegron    Xarelto     Labs       Lab Results   Component Value Date    TRIG 164 (H) 04/29/2020    HDL 42 (L) 04/29/2020     (H) 04/29/2020     Nutrition counseling provided on adequate nutrient intake to promote weight loss.  Discussed appropriate food choices, foods to limit/avoid, sample menu.  Discussed intake of fats, and carbohydrates from sweets.  Discussed eating out and gave suggestions on controlling calorie intake and making healthier food choices.  Pt states she has " decreased how often she eats away from home.    Daily exercise encouraged (as recommended by healthcare provider). Discussed the benefits of exercise in lowering blood glucose, blood pressure, cholesterol, stress and controlling body weight.     Dietitian contact number provided.  Patient encouraged to call with questions or concerns.     Time spent with patient: 20 minutes    Rin Hare RDN, LD  07/24/2023

## 2023-08-28 ENCOUNTER — TRANSCRIBE ORDERS (OUTPATIENT)
Dept: ADMINISTRATIVE | Facility: HOSPITAL | Age: 63
End: 2023-08-28
Payer: COMMERCIAL

## 2023-08-28 DIAGNOSIS — I73.9 INTERMITTENT CLAUDICATION: Primary | ICD-10-CM

## 2023-09-01 PROCEDURE — 87635 SARS-COV-2 COVID-19 AMP PRB: CPT | Performed by: PHYSICIAN ASSISTANT

## 2023-09-14 ENCOUNTER — TELEPHONE (OUTPATIENT)
Dept: GASTROENTEROLOGY | Facility: CLINIC | Age: 63
End: 2023-09-14
Payer: COMMERCIAL

## 2023-09-14 NOTE — TELEPHONE ENCOUNTER
Left voice message reminding patient of her Fibro Scan appointment on 9/15/23 at 11:00 arrival timer 10:45. Nothing to eat 3 hours before appointment.

## 2023-09-15 ENCOUNTER — PROCEDURE VISIT (OUTPATIENT)
Dept: OTHER | Facility: HOSPITAL | Age: 63
End: 2023-09-15
Payer: COMMERCIAL

## 2023-09-15 DIAGNOSIS — K76.0 FATTY LIVER: ICD-10-CM

## 2023-09-15 PROCEDURE — 91200 LIVER ELASTOGRAPHY: CPT | Performed by: NURSE PRACTITIONER

## 2023-09-21 NOTE — PROGRESS NOTES
Liver Elastography  Performed by: Enriqueta Zhou APRN  Authorized by: Echo Lim APRN   Ordering Provider: Echo Lim APRN    Probe:  XL+  Procedure Details:  Procedure: After providing an oral and written explanation of the Fibroscan vibration controlled transient elastography (VTCE) test procedure to the patient. The patient was placed in supine position with right arm in maximum abduction to allow optimal exposure of right lateral abdomen. Patient was briefly assessed, identifying terminus of the xyphoid process and locating an ideal transient elastography testing site, midline and lateral to this point. Patient was instructed to breathe normally and remain stationary during the test process. Pre-measurement data confirmed the transient elastography probe was centered over the liver parenchyma. A series of ten 50 Hz mechanical pulses were applied with controlled application pressure to induce a mechanical shear wave in the liver tissue. For each measurement, the shear wave propagation speed was detected, displayed and converted to its equivalent liver stiffness value in kilopascals. Skin to liver capsules distance and shear wave characteristics were monitored during the entire examination to assure quality data. Median liver stiffness measurement and interquartile range were calculated and displayed in real time. Acquired measurement data was stored and submitted to the provider for review and interpretation. Patient tolerated the procedure well and was discharged without incident.   Clinical Information:     NPO 3 Hours or More: Yes      Actively Drinking: No    Findings:     Median Liver Stiffness Score:  19.5    Interquartile Range (IQR) to Median Ratio:  23    Nery Stiffness Consistent with:  F4 Cirrhosis    Current Scan Considered Reliab: Yes      Median Controlled Attenuation Parameter (dB/m):  332    IQR:  44    CAP SCORE:  Moderate/severe liver fat

## 2023-09-22 ENCOUNTER — TELEPHONE (OUTPATIENT)
Dept: GASTROENTEROLOGY | Facility: CLINIC | Age: 63
End: 2023-09-22
Payer: COMMERCIAL

## 2023-09-22 NOTE — TELEPHONE ENCOUNTER
----- Message from DANI Villalta sent at 9/21/2023  3:44 PM EDT -----  I have reviewed the patient's most recent FibroScan.  The patient's liver stiffness score is consistent with F4 cirrhosis. Fibroscan also indicates moderate to severe fatty infiltration of the liver. Continue previously discussed recommendations of dietary modifications and weight loss. Plan to continue monitoring the liver with imaging and labs every 6 months. If patient has questions/concerns okay to provide earlier office appointment otherwise maintain follow up as scheduled.     
Called pt, no answer, left message for pt to call back.    Postponing result until Monday 9.25.23  
For information on Fall & Injury Prevention, visit [email or web address]

## 2023-09-22 NOTE — PRE-PROCEDURE INSTRUCTIONS
"Instructed on date and arrival time of 0700. Come to entrance \"C\".  Must have  over age 18 to drive home.  May have two visitors; however, children under 12 must stay in waiting room.  Discussed diet/NPO.  May take medications as usual except for blood thinners, diabetic medications, or weight loss medications.  Bring list of medications to hospital.  Verbalized understanding of instructions given.  Instructed to call for questions or concerns.  Hold Xarelto for two days prior to procedure.  Cardiac clearance noted in chart.  "

## 2023-09-28 ENCOUNTER — HOSPITAL ENCOUNTER (OUTPATIENT)
Dept: CARDIOLOGY | Facility: HOSPITAL | Age: 63
Discharge: HOME | End: 2023-09-28
Payer: COMMERCIAL

## 2023-09-28 DIAGNOSIS — I73.9 INTERMITTENT CLAUDICATION: ICD-10-CM

## 2023-09-28 LAB
BH CV LOWER ARTERIAL LEFT ABI RATIO: 1.12
BH CV LOWER ARTERIAL LEFT DORSALIS PEDIS SYS MAX: 148
BH CV LOWER ARTERIAL LEFT GREAT TOE SYS MAX: 118
BH CV LOWER ARTERIAL LEFT POST TIBIAL SYS MAX: 154
BH CV LOWER ARTERIAL LEFT TBI RATIO: 0.86
BH CV LOWER ARTERIAL RIGHT ABI RATIO: 1.11
BH CV LOWER ARTERIAL RIGHT DORSALIS PEDIS SYS MAX: 149
BH CV LOWER ARTERIAL RIGHT GREAT TOE SYS MAX: 101
BH CV LOWER ARTERIAL RIGHT POST TIBIAL SYS MAX: 152
BH CV LOWER ARTERIAL RIGHT TBI RATIO: 0.74
UPPER ARTERIAL LEFT ARM BRACHIAL SYS MAX: 137
UPPER ARTERIAL RIGHT ARM BRACHIAL SYS MAX: 131

## 2023-09-28 PROCEDURE — 93922 UPR/L XTREMITY ART 2 LEVELS: CPT

## 2023-09-29 ENCOUNTER — ANESTHESIA (OUTPATIENT)
Dept: GASTROENTEROLOGY | Facility: HOSPITAL | Age: 63
End: 2023-09-29
Payer: COMMERCIAL

## 2023-09-29 ENCOUNTER — ANESTHESIA EVENT (OUTPATIENT)
Dept: GASTROENTEROLOGY | Facility: HOSPITAL | Age: 63
End: 2023-09-29
Payer: COMMERCIAL

## 2023-09-29 ENCOUNTER — HOSPITAL ENCOUNTER (OUTPATIENT)
Facility: HOSPITAL | Age: 63
Setting detail: HOSPITAL OUTPATIENT SURGERY
Discharge: HOME | End: 2023-09-29
Attending: INTERNAL MEDICINE | Admitting: INTERNAL MEDICINE
Payer: COMMERCIAL

## 2023-09-29 VITALS
HEART RATE: 71 BPM | BODY MASS INDEX: 50.94 KG/M2 | SYSTOLIC BLOOD PRESSURE: 131 MMHG | OXYGEN SATURATION: 93 % | TEMPERATURE: 98 F | DIASTOLIC BLOOD PRESSURE: 70 MMHG | WEIGHT: 293 LBS | RESPIRATION RATE: 16 BRPM

## 2023-09-29 DIAGNOSIS — R11.0 NAUSEA: ICD-10-CM

## 2023-09-29 DIAGNOSIS — K21.9 GASTROESOPHAGEAL REFLUX DISEASE, UNSPECIFIED WHETHER ESOPHAGITIS PRESENT: ICD-10-CM

## 2023-09-29 DIAGNOSIS — R68.81 EARLY SATIETY: ICD-10-CM

## 2023-09-29 PROCEDURE — 25010000002 PROPOFOL 10 MG/ML EMULSION: Performed by: NURSE ANESTHETIST, CERTIFIED REGISTERED

## 2023-09-29 PROCEDURE — 88305 TISSUE EXAM BY PATHOLOGIST: CPT | Performed by: INTERNAL MEDICINE

## 2023-09-29 PROCEDURE — 43239 EGD BIOPSY SINGLE/MULTIPLE: CPT | Performed by: INTERNAL MEDICINE

## 2023-09-29 PROCEDURE — 88342 IMHCHEM/IMCYTCHM 1ST ANTB: CPT | Performed by: INTERNAL MEDICINE

## 2023-09-29 RX ORDER — SODIUM CHLORIDE, SODIUM LACTATE, POTASSIUM CHLORIDE, CALCIUM CHLORIDE 600; 310; 30; 20 MG/100ML; MG/100ML; MG/100ML; MG/100ML
30 INJECTION, SOLUTION INTRAVENOUS CONTINUOUS
Status: DISCONTINUED | OUTPATIENT
Start: 2023-09-29 | End: 2023-09-29 | Stop reason: HOSPADM

## 2023-09-29 RX ORDER — LIDOCAINE HYDROCHLORIDE 20 MG/ML
INJECTION, SOLUTION EPIDURAL; INFILTRATION; INTRACAUDAL; PERINEURAL AS NEEDED
Status: DISCONTINUED | OUTPATIENT
Start: 2023-09-29 | End: 2023-09-29 | Stop reason: SURG

## 2023-09-29 RX ADMIN — SODIUM CHLORIDE, POTASSIUM CHLORIDE, SODIUM LACTATE AND CALCIUM CHLORIDE 30 ML/HR: 600; 310; 30; 20 INJECTION, SOLUTION INTRAVENOUS at 07:30

## 2023-09-29 RX ADMIN — LIDOCAINE HYDROCHLORIDE 40 MG: 20 INJECTION, SOLUTION EPIDURAL; INFILTRATION; INTRACAUDAL; PERINEURAL at 09:03

## 2023-09-29 RX ADMIN — PROPOFOL 250 MCG/KG/MIN: 10 INJECTION, EMULSION INTRAVENOUS at 09:03

## 2023-09-29 NOTE — ANESTHESIA POSTPROCEDURE EVALUATION
Patient: Chiara Zee    Procedure Summary       Date: 09/29/23 Room / Location: [facility] ENDOSCOPY 4 / [facility] ENDOSCOPY    Anesthesia Start: 0901 Anesthesia Stop: 0917    Procedure: ESOPHAGOGASTRODUODENOSCOPY WITH BIOPSIES Diagnosis:       Gastroesophageal reflux disease, unspecified whether esophagitis present      Nausea      Early satiety      (Gastroesophageal reflux disease, unspecified whether esophagitis present [K21.9])      (Nausea [R11.0])      (Early satiety [R68.81])    Surgeons: Gabriel Rankin MD Provider: Fabi Sheriff MD    Anesthesia Type: general ASA Status: 4            Anesthesia Type: general    Vitals  Vitals Value Taken Time   /70 09/29/23 0930   Temp 36.7 °C (98 °F) 09/29/23 0915   Pulse 75 09/29/23 0931   Resp 16 09/29/23 0930   SpO2 95 % 09/29/23 0931   Vitals shown include unvalidated device data.        Post Anesthesia Care and Evaluation    Post-procedure mental status: acceptable.  Pain management: satisfactory to patient    Airway patency: patent  Anesthetic complications: No anesthetic complications    Cardiovascular status: acceptable  Respiratory status: acceptable    Comments: Per chart review

## 2023-09-29 NOTE — H&P
Pre Procedure History & Physical    Chief Complaint:   Epigastric pain heartburn and early satiety    Subjective     HPI:   Epigastric pain heartburn and early satiety    Past Medical History:   Past Medical History:   Diagnosis Date    Alpha-1-antitrypsin deficiency     Arthritis     COPD (chronic obstructive pulmonary disease)     INHALERS PRN    Coronary artery disease     FOLLOWS W/ELSHIEKH, NO C/O CP OR  SOA    Elevated cholesterol     History of thoracentesis     Hyperlipidemia     Hypertension     Myocardial infarction     PT STATES UNKNOWN DATE    Sleep apnea     Umbilical hernia without obstruction and without gangrene        Past Surgical History:  Past Surgical History:   Procedure Laterality Date    BREAST BIOPSY Left      SECTION      COLONOSCOPY      ENDOSCOPY      OTHER SURGICAL HISTORY      right wrist fracture, plate and screws    VENTRAL HERNIA REPAIR N/A 2022    Procedure: Robotic Umbilical Hernia Repair with Mesh Placement;  Surgeon: Epifanio Morales MD;  Location: [facility] OR;  Service: Robotics - DaVinci;  Laterality: N/A;       Family History:  Family History   Problem Relation Age of Onset    Prostate cancer Father     Diabetes Sister     Diabetes Brother     Malig Hyperthermia Neg Hx     Colon cancer Neg Hx        Social History:   reports that she quit smoking about 7 years ago. Her smoking use included cigarettes. She has a 45.00 pack-year smoking history. She has never been exposed to tobacco smoke. She has never used smokeless tobacco. She reports that she does not drink alcohol and does not use drugs.    Medications:   Medications Prior to Admission   Medication Sig Dispense Refill Last Dose    albuterol sulfate  (90 Base) MCG/ACT inhaler Inhale 2 puffs Every 4 (Four) Hours As Needed for Wheezing. 1 g 11 Past Week    atorvastatin (LIPITOR) 20 MG tablet Take 1 tablet by mouth Every Night.   2023    busPIRone (BUSPAR) 10 MG tablet Take 1 tablet by mouth 3  (Three) Times a Day. TAKES FIRST DOSE LATER IN THE DAY   9/28/2023    butalbital-acetaminophen-caffeine (FIORICET, ESGIC) -40 MG per tablet Take 1 tablet by mouth Every 6 (Six) Hours As Needed for Headache. 12 tablet 0 9/28/2023    cetirizine (zyrTEC) 10 MG tablet TAKE ONE TABLET BY MOUTH EVERY DAY 30 tablet 3 9/28/2023    FLUoxetine (PROzac) 20 MG capsule Take 1 capsule by mouth Daily.   9/28/2023    HYDROcodone-acetaminophen (NORCO) 5-325 MG per tablet Take 1-2 tablets by mouth Every 4 (Four) Hours As Needed (Pain). 20 tablet 0 9/28/2023    hydrOXYzine (ATARAX) 25 MG tablet Take 1 tablet by mouth Every 8 (Eight) Hours As Needed.   9/28/2023    meloxicam (MOBIC) 15 MG tablet Take 1 tablet by mouth Daily.   Past Month    metoprolol tartrate (LOPRESSOR) 25 MG tablet Take 1 tablet by mouth Daily.   9/28/2023    mometasone (ELOCON) 0.1 % cream Apply 1 application  topically to the appropriate area as directed Daily.   9/28/2023    montelukast (SINGULAIR) 10 MG tablet    9/28/2023    pantoprazole (PROTONIX) 40 MG EC tablet Take 1 tablet by mouth Every Night.   9/28/2023    tiotropium bromide-olodaterol (STIOLTO RESPIMAT) 2.5-2.5 MCG/ACT aerosol solution inhaler Inhale 2 puffs Daily. 1 each 11 9/28/2023    Vibegron (Gemtesa) 75 MG tablet Take 1 tablet by mouth Daily. 90 tablet 3 9/28/2023    sertraline (ZOLOFT) 50 MG tablet Take 1 tablet by mouth Daily.       Xarelto 2.5 MG tablet Take 1 tablet by mouth 2 (Two) Times a Day With Meals. LAST DOSE A.M. 07/30/22 AS INSTRUCTED BY DR. COLLINS   9/26/2023       Allergies:  Patient has no known allergies.        Objective     Blood pressure 144/69, pulse 70, temperature 98.4 °F (36.9 °C), temperature source Temporal, resp. rate 18, weight 135 kg (296 lb 11.8 oz), SpO2 93 %, not currently breastfeeding.    Physical Exam   Constitutional: Pt is oriented to person, place, and time and well-developed, well-nourished, and in no distress.   Mouth/Throat: Oropharynx is clear  and moist.   Neck: Normal range of motion.   Cardiovascular: Normal rate, regular rhythm and normal heart sounds.    Pulmonary/Chest: Effort normal and breath sounds normal.   Abdominal: Soft. Nontender  Skin: Skin is warm and dry.   Psychiatric: Mood, memory, affect and judgment normal.     Assessment & Plan     Diagnosis:  Epigastric pain, heartburn and early satiety    Anticipated Surgical Procedure:  EGD    The risks, benefits, and alternatives of this procedure have been discussed with the patient or the responsible party- the patient understands and agrees to proceed.

## 2023-09-29 NOTE — ANESTHESIA PREPROCEDURE EVALUATION
Anesthesia Evaluation     Patient summary reviewed and Nursing notes reviewed   NPO Solid Status: > 8 hours  NPO Liquid Status: > 4 hours           Airway   Mallampati: II  TM distance: >3 FB  Neck ROM: limited  No difficulty expected  Dental - normal exam     Pulmonary    (+) pleural effusion, COPD moderate,sleep apnea, rhonchi  Cardiovascular - normal exam    (+) hypertension well controlled, valvular problems/murmurs, past MI  >12 months, CAD, hyperlipidemia      Neuro/Psych  (+) numbness, psychiatric history  GI/Hepatic/Renal/Endo    (+) obesity, morbid obesity, GERD well controlled, GI bleeding , liver disease    Musculoskeletal     Abdominal    Substance History      OB/GYN          Other   arthritis,                   Anesthesia Plan    ASA 4     general   total IV anesthesia  intravenous induction     Anesthetic plan, risks, benefits, and alternatives have been provided, discussed and informed consent has been obtained with: patient.  Pre-procedure education provided  Plan discussed with CRNA.    CODE STATUS:

## 2023-10-02 LAB
CYTO UR: NORMAL
LAB AP CASE REPORT: NORMAL
LAB AP CLINICAL INFORMATION: NORMAL
LAB AP SPECIAL STAINS: NORMAL
PATH REPORT.FINAL DX SPEC: NORMAL
PATH REPORT.GROSS SPEC: NORMAL

## 2023-10-05 ENCOUNTER — TELEPHONE (OUTPATIENT)
Dept: GASTROENTEROLOGY | Facility: CLINIC | Age: 63
End: 2023-10-05
Payer: COMMERCIAL

## 2023-10-05 NOTE — TELEPHONE ENCOUNTER
----- Message from DANI Villalta sent at 10/4/2023  8:01 AM EDT -----  I have reviewed the patient's most recent EGD and pathology report.  Report shows grade A esophagitis and biopsies consistent with reactive changes.  Stomach biopsies show reactive gastropathy.  Biopsies are negative for H. pylori, intestinal metaplasia, dysplasia, and malignancy.  Recommend increasing Protonix to 40 mg twice daily for 1 month and then decrease back down to maintenance at 40 mg daily.  Encouraged small frequent meals, elevate HOB at nights, and do not eat 4 hours before bed to reduce reflux symptoms. Avoid alcohol and trigger foods such as tomato based products and greasy foods. Discontinue NSAIDs. Maintain follow-up appointment.

## 2023-10-09 ENCOUNTER — TELEPHONE (OUTPATIENT)
Dept: GASTROENTEROLOGY | Facility: CLINIC | Age: 63
End: 2023-10-09
Payer: COMMERCIAL

## 2023-10-09 NOTE — TELEPHONE ENCOUNTER
Called pt to remind of overdue lab, no answer but left message for pt to complete at her earliest convenience.

## 2023-10-13 ENCOUNTER — OFFICE VISIT (OUTPATIENT)
Dept: UROLOGY | Facility: CLINIC | Age: 63
End: 2023-10-13
Payer: COMMERCIAL

## 2023-10-13 VITALS
DIASTOLIC BLOOD PRESSURE: 62 MMHG | BODY MASS INDEX: 50.02 KG/M2 | SYSTOLIC BLOOD PRESSURE: 143 MMHG | WEIGHT: 293 LBS | HEART RATE: 69 BPM | HEIGHT: 64 IN

## 2023-10-13 DIAGNOSIS — N39.41 URGE INCONTINENCE OF URINE: Primary | ICD-10-CM

## 2023-10-13 PROBLEM — Z12.11 COLON CANCER SCREENING: Status: ACTIVE | Noted: 2023-10-11

## 2023-10-13 RX ORDER — NYSTATIN 100000 [USP'U]/G
1 POWDER TOPICAL 3 TIMES DAILY
COMMUNITY
Start: 2023-09-26

## 2023-10-13 RX ORDER — IBANDRONATE SODIUM 150 MG/1
150 TABLET, FILM COATED ORAL
COMMUNITY
Start: 2023-08-23

## 2023-10-13 NOTE — PROGRESS NOTES
Chief Complaint: Urinary Incontinence    Subjective         History of Present Illness  Chiara Zee is a 63 y.o. female presents to [facility] UROLOGY to be seen for f/u Urinary incontinence.    At last visit we started patient on Gemtesa to see if this would help alleviate some of her urinary urgency and frequency.    The patient states that since starting Gemtesa she is doing exceptionally well.    Nocturia x 2 previously was 5     She denies urgency or frequency.     She is pleased with her results to date.        Previous:  She was placed on myrbetriq 50 mg q day at last visit.     She states she has seen no change in symptoms.    She states she has been with itching to her head as well.    She continues to have bothersome leakage and urgency with frequency.    She has been on oxybutynin 5 mg daily for the last 8 months to see if this would help to alleviate her urgency and frequency as well as urge urinary incontinence.    She states her symptoms worsened in the last month.     She states she is still with urge incontinence.    She has had more falls recently.    She feels as if she has had some cognitive decline and skin itching.      The patient has continued to decrease caffeine intake.    At last visit we discussed that her Caffeine intake may be contributing to her symptoms.    She has cut out caffeine and is only drinking pink lemonade.    She states that her leakage has improved but not gone entirely.      The patient states that she will not have the urge to go and will stand up then will have full bladder leakage and at times will get the urge to go but cannot make it.    She drinks coffee 20 oz and pepsi zero x2 daily even during the night.     She has nocturia x 1 rarely 2x     She voids every hour during the day.    She does have leakage with cough laugh and sneeze, but not as bothersome.    She has not had  Hysterectomy.    Objective     Past Medical History:    Diagnosis Date    Alpha-1-antitrypsin deficiency     Arthritis     COPD (chronic obstructive pulmonary disease)     INHALERS PRN    Coronary artery disease     FOLLOWS W/ELSHIEKH, NO C/O CP OR  SOA    Elevated cholesterol     History of thoracentesis     Hyperlipidemia     Hypertension     Myocardial infarction     PT STATES UNKNOWN DATE    Sleep apnea     Umbilical hernia without obstruction and without gangrene        Past Surgical History:   Procedure Laterality Date    BREAST BIOPSY Left      SECTION      COLONOSCOPY      ENDOSCOPY      ENDOSCOPY N/A 2023    Procedure: ESOPHAGOGASTRODUODENOSCOPY WITH BIOPSIES;  Surgeon: Gabriel Rankin MD;  Location: [facility] ENDOSCOPY;  Service: Gastroenterology;  Laterality: N/A;  HIATAL HERNIA, REFLUX ESOPHAGITIS    OTHER SURGICAL HISTORY      right wrist fracture, plate and screws    VENTRAL HERNIA REPAIR N/A 2022    Procedure: Robotic Umbilical Hernia Repair with Mesh Placement;  Surgeon: Epifanio Morales MD;  Location: [facility] MAIN OR;  Service: Robotics - DaVinci;  Laterality: N/A;         Current Outpatient Medications:     albuterol sulfate  (90 Base) MCG/ACT inhaler, Inhale 2 puffs Every 4 (Four) Hours As Needed for Wheezing., Disp: 1 g, Rfl: 11    atorvastatin (LIPITOR) 20 MG tablet, Take 1 tablet by mouth Every Night., Disp: , Rfl:     busPIRone (BUSPAR) 10 MG tablet, Take 1 tablet by mouth 3 (Three) Times a Day. TAKES FIRST DOSE LATER IN THE DAY, Disp: , Rfl:     butalbital-acetaminophen-caffeine (FIORICET, ESGIC) -40 MG per tablet, Take 1 tablet by mouth Every 6 (Six) Hours As Needed for Headache., Disp: 12 tablet, Rfl: 0    cetirizine (zyrTEC) 10 MG tablet, TAKE ONE TABLET BY MOUTH EVERY DAY, Disp: 30 tablet, Rfl: 3    FLUoxetine (PROzac) 20 MG capsule, Take 1 capsule by mouth Daily., Disp: , Rfl:     HYDROcodone-acetaminophen (NORCO) 5-325 MG per tablet, Take 1-2 tablets by mouth Every 4 (Four) Hours As Needed (Pain)., Disp:  20 tablet, Rfl: 0    hydrOXYzine (ATARAX) 25 MG tablet, Take 1 tablet by mouth Every 8 (Eight) Hours As Needed., Disp: , Rfl:     ibandronate (BONIVA) 150 MG tablet, Take 1 tablet by mouth Every 30 (Thirty) Days., Disp: , Rfl:     meloxicam (MOBIC) 15 MG tablet, Take 1 tablet by mouth Daily., Disp: , Rfl:     metoprolol tartrate (LOPRESSOR) 25 MG tablet, Take 1 tablet by mouth Daily., Disp: , Rfl:     mometasone (ELOCON) 0.1 % cream, Apply 1 application  topically to the appropriate area as directed Daily., Disp: , Rfl:     montelukast (SINGULAIR) 10 MG tablet, , Disp: , Rfl:     nystatin (MYCOSTATIN) 469550 UNIT/GM powder, Apply 1 application  topically to the appropriate area as directed 3 (Three) Times a Day., Disp: , Rfl:     pantoprazole (PROTONIX) 40 MG EC tablet, Take 1 tablet by mouth Every Night., Disp: , Rfl:     sertraline (ZOLOFT) 50 MG tablet, Take 1 tablet by mouth Daily., Disp: , Rfl:     tiotropium bromide-olodaterol (STIOLTO RESPIMAT) 2.5-2.5 MCG/ACT aerosol solution inhaler, Inhale 2 puffs Daily., Disp: 1 each, Rfl: 11    Vibegron (Gemtesa) 75 MG tablet, Take 1 tablet by mouth Daily., Disp: 90 tablet, Rfl: 3    Xarelto 2.5 MG tablet, Take 1 tablet by mouth 2 (Two) Times a Day With Meals. LAST DOSE A.M. 22 AS INSTRUCTED BY DR. COLLINS, Disp: , Rfl:     No Known Allergies     Family History   Problem Relation Age of Onset    Prostate cancer Father     Diabetes Sister     Diabetes Brother     Malig Hyperthermia Neg Hx     Colon cancer Neg Hx        Social History     Socioeconomic History    Marital status:    Tobacco Use    Smoking status: Former     Packs/day: 1.00     Years: 45.00     Additional pack years: 0.00     Total pack years: 45.00     Types: Cigarettes     Quit date:      Years since quittin.7     Passive exposure: Never    Smokeless tobacco: Never   Vaping Use    Vaping Use: Never used   Substance and Sexual Activity    Alcohol use: Never    Drug use: Never     "Sexual activity: Defer       Vital Signs:   /62 (BP Location: Left arm, Patient Position: Sitting, Cuff Size: Large Adult)   Pulse 69   Ht 162.6 cm (64\")   Wt 135 kg (297 lb 9.9 oz)   BMI 51.09 kg/mý      Physical Exam     Result Review :   The following data was reviewed by: DANI Rivas on 07/13/2023:  Results for orders placed or performed during the hospital encounter of 09/29/23   Tissue Pathology Exam    Specimen: A: Gastric, Antrum; Tissue    B: Esophagus, Distal; Tissue   Result Value Ref Range    Case Report       Surgical Pathology Report                         Case: [id number]                                  Authorizing Provider:  Gabriel Rankin MD    Collected:           09/29/2023 09:08 AM          Ordering Location:     [facility] Received:            09/29/2023 11:54 AM                                 SUITES                                                                       Pathologist:           Yun Carpio DO                                                       Specimens:   1) - Gastric, Antrum, GASTRIC ANTRUM AND BODY BIOPSIES                                              2) - Esophagus, Distal, DISTAL ESOPHAGUS BIOPSIES                                          Clinical Information       Gastroesophageal reflux disease, unspecified whether esophagitis present  Nausea  Early satiety      Final Diagnosis       1. Stomach, antrum and body, biopsy:   - Gastric antrum and body/fundus mucosa with reactive gastropathy and mild chronic inactive gastritis   - Negative for Helicobacter pylori on immunohistochemical stain   - Negative for intestinal metaplasia, dysplasia and malignancy      2. Distal esophagus, biopsy:   - Squamocolumnar mucosa with mild reactive epithelial changes   - Negative for intestinal metaplasia, dysplasia and malignancy       Gross Description       1. Gastric, Antrum.  Received in formalin and labeled \" gastric antrum and body\" " "is a 0.7 cm aggregate of tan soft tissue fragments. The specimen is entirely submitted in one cassette.    2. Esophagus, Distal.  Received in formalin and labeled \" distal esophagus\" is a 0.4 cm fragment of tan soft tissue. The specimen is entirely submitted in one cassette.   ALVARADO      Special Stains       An immunoperoxidase stain for Helicobacter pylori was performed to aid in its detection since it was not readily identified on H&E stain.  All immunohistochemical/cytochemical stains (IHC) are performed on separate slides per different antibody unless otherwise specified in the documentation that a cocktail (multiple stain) was performed.  Controls are appropriate.        Microscopic Description       Microscopic examination performed.                    Procedures        Assessment and Plan    Diagnoses and all orders for this visit:    1. Urge incontinence of urine (Primary)      Patient is doing well on Gemtesa we will continue her on this and follow-up with her in 6 months or sooner if needed.    She will call the office with any new or worsening symptoms or symptoms of UTI for an outpatient culture      I spent  10 minutes caring for Chiara on this date of service. This time includes time spent by me in the following activities:reviewing tests, obtaining and/or reviewing a separately obtained history, performing a medically appropriate examination and/or evaluation , counseling and educating the patient/family/caregiver, ordering medications, tests, or procedures, and documenting information in the medical record  Follow Up   Return in about 6 months (around 4/13/2024) for f/u OAB .  Patient was given instructions and counseling regarding her condition or for health maintenance advice. Please see specific information pulled into the AVS if appropriate.         This document has been electronically signed by DANI Rivas  October 13, 2023 09:37 EDT       "

## 2023-11-10 RX ORDER — CETIRIZINE HYDROCHLORIDE 10 MG/1
TABLET ORAL
Qty: 30 TABLET | Refills: 3 | Status: SHIPPED | OUTPATIENT
Start: 2023-11-10

## 2023-12-14 ENCOUNTER — TELEPHONE (OUTPATIENT)
Dept: GASTROENTEROLOGY | Facility: CLINIC | Age: 63
End: 2023-12-14
Payer: COMMERCIAL

## 2023-12-14 DIAGNOSIS — K76.0 FATTY LIVER: Primary | ICD-10-CM

## 2023-12-14 NOTE — TELEPHONE ENCOUNTER
Hub staff attempted to follow warm transfer process and was unsuccessful     Caller: Chiara Zee    Relationship to patient: Self    Best call back number: [phone]     Patient is needing: PATIENT STATES SOMEONE FROM THE OFFICE  CALLED REGARDING OVERDUE BLOOD WORK. PLEASE CALL PATIENT.

## 2023-12-14 NOTE — TELEPHONE ENCOUNTER
S/w patient. Patient was called regarding an overdue lab ordered by Echo Lim. Patient did not realize she needed a lab done. She will try to complete tomorrow.     Will call with any questions or concerns

## 2023-12-15 ENCOUNTER — LAB (OUTPATIENT)
Dept: LAB | Facility: HOSPITAL | Age: 63
End: 2023-12-15
Payer: COMMERCIAL

## 2023-12-15 DIAGNOSIS — K76.0 FATTY LIVER: ICD-10-CM

## 2023-12-15 PROCEDURE — 36415 COLL VENOUS BLD VENIPUNCTURE: CPT

## 2023-12-15 PROCEDURE — 86015 ACTIN ANTIBODY EACH: CPT

## 2023-12-18 LAB — SMA IGG SER-ACNC: 23 UNITS (ref 0–19)

## 2023-12-19 DIAGNOSIS — R74.8 ELEVATED LIVER ENZYMES: Primary | ICD-10-CM

## 2023-12-22 ENCOUNTER — TELEPHONE (OUTPATIENT)
Dept: GASTROENTEROLOGY | Facility: CLINIC | Age: 63
End: 2023-12-22
Payer: COMMERCIAL

## 2023-12-22 NOTE — TELEPHONE ENCOUNTER
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 Repeat labs in 3 months.

## 2023-12-22 NOTE — TELEPHONE ENCOUNTER
Left detailed vm (ok per ELIZABETH) of results. Stated I would also send [employer] message too. Encouraged to keep f/u apt. roverto

## 2024-01-19 ENCOUNTER — TELEPHONE (OUTPATIENT)
Dept: GASTROENTEROLOGY | Facility: CLINIC | Age: 64
End: 2024-01-19

## 2024-01-19 NOTE — TELEPHONE ENCOUNTER
I left pt a VM to confirm appt for today. I offered pt to come in this morning OK per Echo Lim. I left the office number as a call back number.

## 2024-01-26 ENCOUNTER — OFFICE VISIT (OUTPATIENT)
Dept: GASTROENTEROLOGY | Facility: CLINIC | Age: 64
End: 2024-01-26
Payer: COMMERCIAL

## 2024-01-26 VITALS
OXYGEN SATURATION: 97 % | SYSTOLIC BLOOD PRESSURE: 103 MMHG | DIASTOLIC BLOOD PRESSURE: 61 MMHG | BODY MASS INDEX: 50.02 KG/M2 | HEIGHT: 64 IN | HEART RATE: 73 BPM | WEIGHT: 293 LBS

## 2024-01-26 DIAGNOSIS — K74.60 CIRRHOSIS OF LIVER WITHOUT ASCITES, UNSPECIFIED HEPATIC CIRRHOSIS TYPE: Primary | ICD-10-CM

## 2024-01-26 DIAGNOSIS — E66.01 CLASS 3 SEVERE OBESITY WITH BODY MASS INDEX (BMI) OF 50.0 TO 59.9 IN ADULT, UNSPECIFIED OBESITY TYPE, UNSPECIFIED WHETHER SERIOUS COMORBIDITY PRESENT: ICD-10-CM

## 2024-01-26 DIAGNOSIS — K21.00 GASTROESOPHAGEAL REFLUX DISEASE WITH ESOPHAGITIS, UNSPECIFIED WHETHER HEMORRHAGE: ICD-10-CM

## 2024-01-26 RX ORDER — HYDROXYZINE HYDROCHLORIDE 25 MG/1
1 TABLET, FILM COATED ORAL EVERY 8 HOURS PRN
COMMUNITY
Start: 2023-10-24

## 2024-01-26 NOTE — PATIENT INSTRUCTIONS
Cirrhosis    Cirrhosis is long-term (chronic) liver injury. The liver is the body's largest internal organ, and it performs many functions. It converts food into energy, removes toxic material from the blood, makes important proteins, and absorbs necessary vitamins from food.  In cirrhosis, healthy liver cells are replaced by scar tissue. This prevents blood from flowing through the liver and makes it difficult for the liver to complete its functions.  What are the causes?  Common causes of this condition are hepatitis C and long-term alcohol abuse. Other causes include:  Nonalcoholic fatty liver disease (NAFLD). This happens when fat is deposited in the liver by causes other than alcohol.  Hepatitis B infection.  Autoimmune hepatitis. In this condition, the body's defense system (immune system) mistakenly attacks the liver cells, causing inflammation.  Diseases that cause blockage of ducts inside the liver.  Inherited liver diseases, such as hemochromatosis. This is one of the most common inherited liver diseases. In this disease, deposits of iron collect in the liver and other organs.  Reactions to certain long-term medicines, such as amiodarone, a heart medicine.  Parasitic infections. These include schistosomiasis, which is caused by a flatworm.  Long-term contact to certain toxins. These toxins include certain organic solvents, such as toluene and chloroform.  What increases the risk?  You are more likely to develop this condition if:  You have certain types of viral hepatitis.  You abuse alcohol, especially if you are female.  You are overweight.  You use IV drugs and share needles.  You have unprotected sex with someone who has viral hepatitis.  What are the signs or symptoms?  You may not have any signs and symptoms at first. Symptoms may not develop until the damage to your liver starts to get worse.  Early symptoms may include:  Weakness and tiredness (fatigue).  Changes in sleep patterns or having trouble  sleeping.  Itchiness.  Tenderness in the right-upper part of your abdomen.  Weight loss and muscle loss.  Nausea.  Loss of appetite.  Later symptoms may include:  Fatigue or weakness that is getting worse.  Yellow skin and eyes (jaundice).  Buildup of fluid in the abdomen (ascites). You may notice that your clothes are tight around your waist.  Weight gain and swelling of the feet and ankles (edema).  Trouble breathing.  Easy bruising and bleeding.  Vomiting blood, or black or bloody stool.  Mental confusion.  How is this diagnosed?  Your health care provider may suspect cirrhosis based on your symptoms and medical history, especially if you have other medical conditions or a history of alcohol abuse. Your health care provider will do a physical exam to feel your liver and to check for signs of cirrhosis. Tests may include:  Blood tests to check:  For hepatitis B or C.  Kidney function.  Liver function.  Imaging tests such as:  MRI or CT scan to look for changes seen in advanced cirrhosis.  Ultrasound to see if normal liver tissue is being replaced by scar tissue.  A procedure in which a long needle is used to take a sample of liver tissue to be checked in a lab (biopsy). Liver biopsy can confirm the diagnosis of cirrhosis.  How is this treated?  Treatment for this condition depends on how damaged your liver is and what caused the damage. It may include treating the symptoms of cirrhosis, or treating the underlying causes to slow the damage. Treatment may include:  Making lifestyle changes, such as:  Eating a healthy diet. You may need to work with your health care provider or a dietitian to develop an eating plan.  Restricting salt intake.  Maintaining a healthy weight.  Not abusing drugs or alcohol.  Taking medicines to:  Treat liver infections or other infections.  Control itching.  Reduce fluid buildup.  Reduce certain blood toxins.  Reduce risk of bleeding from enlarged blood vessels in the stomach or esophagus  (varices).  Liver transplant. In this procedure, a liver from a donor is used to replace your diseased liver. This is done if cirrhosis has caused liver failure.  Other treatments and procedures may be done depending on the problems that you get from cirrhosis. Common problems include liver-related kidney failure (hepatorenal syndrome).  Follow these instructions at home:    Take medicines only as told by your health care provider. Do not use medicines that are toxic to your liver. Ask your health care provider before taking any new medicines, including over-the-counter medicines such as NSAIDs.  Rest as needed.  Eat a well-balanced diet.  Limit your salt or water intake, if your health care provider asks you to do this.  Do not drink alcohol. This is especially important if you routinely take acetaminophen.  Keep all follow-up visits. This is important.  Contact a health care provider if you:  Have fatigue or weakness that is getting worse.  Develop swelling of the hands, feet, or legs, or a buildup of fluid in the abdomen (ascites).  Have a fever or chills.  Develop loss of appetite.  Have nausea or vomiting.  Develop jaundice.  Develop easy bruising or bleeding.  Get help right away if you:  Vomit bright red blood or a material that looks like coffee grounds.  Have blood in your stools.  Notice that your stools appear black and tarry.  Become confused.  Have chest pain or trouble breathing.  These symptoms may represent a serious problem that is an emergency. Do not wait to see if the symptoms will go away. Get medical help right away. Call your local emergency services (911 in the [place] Do not drive yourself to the hospital.  Summary  Cirrhosis is chronic liver injury. Common causes are hepatitis C and long-term alcohol abuse.  Tests used to diagnose cirrhosis include blood tests, imaging tests, and liver biopsy.  Treatment for this condition involves treating the underlying cause. Avoid alcohol, drugs, salt,  and medicines that may damage your liver.  Get help right away if you vomit bright red blood or a material that looks like coffee grounds.  This information is not intended to replace advice given to you by your health care provider. Make sure you discuss any questions you have with your health care provider.  Document Revised: 09/30/2021 Document Reviewed: 09/30/2021  [employer] Patient Education © 2023 [employer]

## 2024-01-26 NOTE — PROGRESS NOTES
Chief Complaint  No chief complaint on file.    Chiara Zee is a 63 y.o. female who presents to [facility] [place] for follow up.     History of present Illness  Establish care 2023 for follow-up.  Denied alcohol use.  Protonix 40 mg daily controls heartburn.  US abdomen 2023 - diffusely increased in echogenicity, normal gallbladder, CBD 5mm   Liver 2023 - unremarkable other than ASMA 29 (repeat 12/15/23 - weak positive )  Fibroscan 9/15/23 - moderate/severe liver fat, F4 cirrhosis.   Liver enzymes 10/24/23 - normal.  EGD 23 by Dr. Rankin - LA grade A esophagitis, small hiatal hernia, normal stomach and duodenum. Esophageal biopsies - mild reactive epithelial changes. Stomach biopsies - reactive gastropathy and chronic inactive gastritis.     Colonoscopy 24 by Dr. Acosta - normal mucosa throughout with diverticula in the sigmoid colon. Repeat 10 years.     Patient presents to the office for follow up. Patient has lost 1 pound since last office visit. Heartburn is well controlled with Protonix 40mg daily. Denies nausea, vomiting, epigastric pain, and dysphagia. Denies lower GI symptoms. Denies RUQ pain, extremity/abdominal swelling, excessive bruising/bleeding, problems sleeping, and forgetfulness.    Past Medical History:   Diagnosis Date    Alpha-1-antitrypsin deficiency     Arthritis     COPD (chronic obstructive pulmonary disease)     INHALERS PRN    Coronary artery disease     FOLLOWS W/ELSHIEKH, NO C/O CP OR  SOA    Elevated cholesterol     History of thoracentesis     Hyperlipidemia     Hypertension     Myocardial infarction     PT STATES UNKNOWN DATE    Sleep apnea     Umbilical hernia without obstruction and without gangrene        Past Surgical History:   Procedure Laterality Date    BREAST BIOPSY Left      SECTION      COLONOSCOPY      ENDOSCOPY      ENDOSCOPY N/A 2023    Procedure: ESOPHAGOGASTRODUODENOSCOPY WITH  BIOPSIES;  Surgeon: Gabriel Rankin MD;  Location: [facility] ENDOSCOPY;  Service: Gastroenterology;  Laterality: N/A;  HIATAL HERNIA, REFLUX ESOPHAGITIS    OTHER SURGICAL HISTORY      right wrist fracture, plate and screws    UPPER GASTROINTESTINAL ENDOSCOPY      VENTRAL HERNIA REPAIR N/A 08/01/2022    Procedure: Robotic Umbilical Hernia Repair with Mesh Placement;  Surgeon: Epifanio Morales MD;  Location: [facility] MAIN OR;  Service: Robotics - DaVinci;  Laterality: N/A;         Current Outpatient Medications:     albuterol sulfate  (90 Base) MCG/ACT inhaler, Inhale 2 puffs Every 4 (Four) Hours As Needed for Wheezing., Disp: 1 g, Rfl: 11    atorvastatin (LIPITOR) 20 MG tablet, Take 1 tablet by mouth Every Night., Disp: , Rfl:     busPIRone (BUSPAR) 10 MG tablet, Take 1 tablet by mouth 3 (Three) Times a Day. TAKES FIRST DOSE LATER IN THE DAY, Disp: , Rfl:     butalbital-acetaminophen-caffeine (FIORICET, ESGIC) -40 MG per tablet, Take 1 tablet by mouth Every 6 (Six) Hours As Needed for Headache., Disp: 12 tablet, Rfl: 0    cetirizine (zyrTEC) 10 MG tablet, TAKE ONE TABLET BY MOUTH EVERY DAY, Disp: 30 tablet, Rfl: 3    FLUoxetine (PROzac) 20 MG capsule, Take 1 capsule by mouth Daily., Disp: , Rfl:     HYDROcodone-acetaminophen (NORCO) 5-325 MG per tablet, Take 1-2 tablets by mouth Every 4 (Four) Hours As Needed (Pain)., Disp: 20 tablet, Rfl: 0    hydrOXYzine (ATARAX) 25 MG tablet, Take 1 tablet by mouth Every 8 (Eight) Hours As Needed., Disp: , Rfl:     ibandronate (BONIVA) 150 MG tablet, Take 1 tablet by mouth Every 30 (Thirty) Days., Disp: , Rfl:     meloxicam (MOBIC) 15 MG tablet, Take 1 tablet by mouth Daily., Disp: , Rfl:     metoprolol tartrate (LOPRESSOR) 25 MG tablet, Take 1 tablet by mouth Daily., Disp: , Rfl:     mometasone (ELOCON) 0.1 % cream, Apply 1 application  topically to the appropriate area as directed Daily., Disp: , Rfl:     montelukast (SINGULAIR) 10 MG tablet, , Disp: , Rfl:      "nystatin (MYCOSTATIN) 801364 UNIT/GM powder, Apply 1 application  topically to the appropriate area as directed 3 (Three) Times a Day., Disp: , Rfl:     pantoprazole (PROTONIX) 40 MG EC tablet, Take 1 tablet by mouth Every Night., Disp: , Rfl:     sertraline (ZOLOFT) 50 MG tablet, Take 1 tablet by mouth Daily., Disp: , Rfl:     tiotropium bromide-olodaterol (STIOLTO RESPIMAT) 2.5-2.5 MCG/ACT aerosol solution inhaler, Inhale 2 puffs Daily., Disp: 1 each, Rfl: 11    Vibegron (Gemtesa) 75 MG tablet, Take 1 tablet by mouth Daily., Disp: 90 tablet, Rfl: 3    Xarelto 2.5 MG tablet, Take 1 tablet by mouth 2 (Two) Times a Day With Meals. LAST DOSE A.M. 07/30/22 AS INSTRUCTED BY DR. COLLINS, Disp: , Rfl:      No Known Allergies    Family History   Problem Relation Age of Onset    Prostate cancer Father     Diabetes Sister     Diabetes Brother     [place] Hyperthermia Neg Hx     Colon cancer Neg Hx         Social History     Social History Narrative    Not on file       Objective       Vital Signs:   /61 (BP Location: Left arm, Patient Position: Sitting, Cuff Size: Adult)   Pulse 73   Ht 162.6 cm (64\")   Wt 133 kg (294 lb)   SpO2 97%   BMI 50.46 kg/m²       Physical Exam  Constitutional:       Appearance: Normal appearance. She is normal weight.   HENT:      Head: Normocephalic and atraumatic.      Nose: Nose normal.   Pulmonary:      Effort: Pulmonary effort is normal.   Skin:     General: Skin is warm and dry.   Neurological:      Mental Status: She is alert and oriented to person, place, and time. Mental status is at baseline.   Psychiatric:         Mood and Affect: Mood normal.         Behavior: Behavior normal.         Thought Content: Thought content normal.         Judgment: Judgment normal.         Result Review :       CBC w/diff          7/14/2023    14:17 10/24/2023    10:27   CBC w/Diff   WBC 3.90  3.96       RBC 4.38  4.20       Hemoglobin 13.6  13.1       Hematocrit 41.1  40.0       MCV 93.8  95.2     "   MCH 31.1  31.2       MCHC 33.1  32.8       RDW 12.5  13.4       Platelets 162  149       Neutrophil Rel % 44.3  37.6       Immature Granulocyte Rel % 0.5  0.8       Lymphocyte Rel % 35.9  38.1       Monocyte Rel % 11.3  12.4       Eosinophil Rel % 6.2  9.6       Basophil Rel % 1.8  1.5          Details          This result is from an external source.             CMP          7/14/2023    14:17   CMP   Total Protein 6.7    Albumin 4.5    Total Bilirubin 0.6    Alkaline Phosphatase 90    AST (SGOT) 29    ALT (SGPT) 32        Liver Workup   ALPHA -1 ANTITRYPSIN   Date Value Ref Range Status   07/14/2023 163 90 - 200 mg/dL Final     Smooth Muscle Ab   Date Value Ref Range Status   12/15/2023 23 (H) 0 - 19 Units Final     Comment:                      Negative                     0 - 19                   Weak positive               20 - 30                   Moderate to strong positive     >30   Actin Antibodies are found in 52-85% of patients with   autoimmune hepatitis or chronic active hepatitis and   in 22% of patients with primary biliary cirrhosis.     Ceruloplasmin   Date Value Ref Range Status   07/14/2023 25 19 - 39 mg/dL Final     Ferritin   Date Value Ref Range Status   07/14/2023 219.00 (H) 13.00 - 150.00 ng/mL Final     Immunofixation Result, Serum   Date Value Ref Range Status   07/14/2023 Comment  Final     Comment:     No monoclonality detected.     IgG   Date Value Ref Range Status   07/14/2023 480 (L) 586 - 1602 mg/dL Final     IgA   Date Value Ref Range Status   07/14/2023 143 87 - 352 mg/dL Final     IgM   Date Value Ref Range Status   07/14/2023 36 26 - 217 mg/dL Final     Iron   Date Value Ref Range Status   07/14/2023 95 37 - 145 mcg/dL Final     TIBC   Date Value Ref Range Status   07/14/2023 380 298 - 536 mcg/dL Final     Iron Saturation (TSAT)   Date Value Ref Range Status   07/14/2023 25 20 - 50 % Final     Transferrin   Date Value Ref Range Status   07/14/2023 255 200 - 360 mg/dL Final      Mitochondrial Ab   Date Value Ref Range Status   07/14/2023 <20.0 0.0 - 20.0 Units Final     Comment:                                     Negative    0.0 - 20.0                                  Equivocal  20.1 - 24.9                                  Positive         >24.9  Mitochondrial (M2) Antibodies are found in 90-96% of  patients with primary biliary cirrhosis.     Protime   Date Value Ref Range Status   07/14/2023 13.6 11.8 - 14.9 Seconds Final     INR   Date Value Ref Range Status   07/14/2023 1.03 0.86 - 1.15 Final     ALPHA-FETOPROTEIN   Date Value Ref Range Status   07/14/2023 4.93 0 - 8.3 ng/mL Final           Assessment and Plan    Diagnoses and all orders for this visit:    1. Cirrhosis of liver without ascites, unspecified hepatic cirrhosis type (Primary)  -     CBC & Differential; Future  -     Comprehensive Metabolic Panel; Future  -     Protime-INR; Future  -     AFP Tumor Marker; Future  -     CT Abdomen Pelvis With Contrast; Future  -     Ammonia; Future    2. Gastroesophageal reflux disease with esophagitis, unspecified whether hemorrhage    3. Class 3 severe obesity with body mass index (BMI) of 50.0 to 59.9 in adult, unspecified obesity type, unspecified whether serious comorbidity present    Continue Protonix 40mg daily.  Thoroughly discussed cirrhosis diagnosis.    General Recommendations:  - Abstain from alcohol and illicit substances which can accelerate progression of liver disease.  - Tobacco cessation  - Hepatitis A and B hepatitis vaccinations   - Pneumococcal, meningococcal, and zoster vaccinations  - Yearly influenza.   - Avoid narcotics, benzodiazepines, sedatives, or sleeping medications (other than diphenhydramine) as these medications have potential to aggravate hepatic encephalopathy.   - Avoid aspirin (including products containing aspirin such as Excedrin) and NSAIDs (Including but not limited to ibuprofen, Advil, Aleve, Motrin, naproxen, Nuprin, and Goody Powders). These  products have the potential to decrease platelet adhesivenness, irritate the stomach, and reduce renal function in patients with liver disease.   - Limit Tylenol < 1,000 mg daily.    Portal hypertension screening:  - Patient up to date for EGD (9/29/23) to screen for esophageal varices.   - Medical treatment (beta blockers) and banding procedures discussed.     Hepatocellular carcinoma (HCC):  -The patient is aware of the increased risk of HCC. Schedule CT scan and check AFP every 6 months for HCC surveillance.     Nutrition:  - Recommended diet: 35-40 kcal/kg/min with 1.2 - 1.5 g/kg/day of lean protein from poultry, fish and legumes, with at least 3 servings protein daily. Eat plenty of vegetables and fruits.  - Diet along with regular exercise as tolerated was recommended to decrease the muscle wasting which is associated with chronic liver disease.   - Encouraged to be conscientious of dietary sodium intake. <2 grams per day.   - Check vitamin A and vitamin D and supplement as necessary.     Follow up:  - Office visit every 6 months with labs and an US.  - Return sooner should the need arise. Pt understands to call any time with questions or concerns.   - Counseled to call if jaundice, ascites, encephalopathy or bleeding develop.    Follow Up   Return in about 6 months (around 7/26/2024) for cirrhosis.  Patient was given instructions and counseling regarding her condition or for health maintenance advice. Please see specific information pulled into the AVS if appropriate.

## 2024-02-13 ENCOUNTER — LAB (OUTPATIENT)
Dept: LAB | Facility: HOSPITAL | Age: 64
End: 2024-02-13
Payer: COMMERCIAL

## 2024-02-13 DIAGNOSIS — R74.8 ELEVATED LIVER ENZYMES: ICD-10-CM

## 2024-02-13 DIAGNOSIS — K74.60 CIRRHOSIS OF LIVER WITHOUT ASCITES, UNSPECIFIED HEPATIC CIRRHOSIS TYPE: ICD-10-CM

## 2024-02-13 LAB
ALBUMIN SERPL-MCNC: 4.7 G/DL (ref 3.5–5.2)
ALBUMIN/GLOB SERPL: 2.8 G/DL
ALP SERPL-CCNC: 85 U/L (ref 39–117)
ALPHA-FETOPROTEIN: 3.86 NG/ML (ref 0–8.3)
ALT SERPL W P-5'-P-CCNC: 28 U/L (ref 1–33)
AMMONIA BLD-SCNC: 14 UMOL/L (ref 11–51)
ANION GAP SERPL CALCULATED.3IONS-SCNC: 11 MMOL/L (ref 5–15)
AST SERPL-CCNC: 29 U/L (ref 1–32)
BASOPHILS # BLD AUTO: 0.04 10*3/MM3 (ref 0–0.2)
BASOPHILS NFR BLD AUTO: 1.2 % (ref 0–1.5)
BILIRUB CONJ SERPL-MCNC: <0.2 MG/DL (ref 0–0.3)
BILIRUB SERPL-MCNC: 0.7 MG/DL (ref 0–1.2)
BUN SERPL-MCNC: 8 MG/DL (ref 8–23)
BUN/CREAT SERPL: 9.5 (ref 7–25)
CALCIUM SPEC-SCNC: 9.7 MG/DL (ref 8.6–10.5)
CHLORIDE SERPL-SCNC: 107 MMOL/L (ref 98–107)
CO2 SERPL-SCNC: 25 MMOL/L (ref 22–29)
CREAT SERPL-MCNC: 0.84 MG/DL (ref 0.57–1)
DEPRECATED RDW RBC AUTO: 44.6 FL (ref 37–54)
EGFRCR SERPLBLD CKD-EPI 2021: 77.7 ML/MIN/1.73
EOSINOPHIL # BLD AUTO: 0.29 10*3/MM3 (ref 0–0.4)
EOSINOPHIL NFR BLD AUTO: 8.8 % (ref 0.3–6.2)
ERYTHROCYTE [DISTWIDTH] IN BLOOD BY AUTOMATED COUNT: 13.2 % (ref 12.3–15.4)
GLOBULIN UR ELPH-MCNC: 1.7 GM/DL
GLUCOSE SERPL-MCNC: 95 MG/DL (ref 65–99)
HCT VFR BLD AUTO: 39.7 % (ref 34–46.6)
HGB BLD-MCNC: 13.2 G/DL (ref 12–15.9)
IMM GRANULOCYTES # BLD AUTO: 0.01 10*3/MM3 (ref 0–0.05)
IMM GRANULOCYTES NFR BLD AUTO: 0.3 % (ref 0–0.5)
INR PPP: 0.96 (ref 0.86–1.15)
LYMPHOCYTES # BLD AUTO: 1.27 10*3/MM3 (ref 0.7–3.1)
LYMPHOCYTES NFR BLD AUTO: 38.4 % (ref 19.6–45.3)
MCH RBC QN AUTO: 30.5 PG (ref 26.6–33)
MCHC RBC AUTO-ENTMCNC: 33.2 G/DL (ref 31.5–35.7)
MCV RBC AUTO: 91.7 FL (ref 79–97)
MONOCYTES # BLD AUTO: 0.3 10*3/MM3 (ref 0.1–0.9)
MONOCYTES NFR BLD AUTO: 9.1 % (ref 5–12)
NEUTROPHILS NFR BLD AUTO: 1.4 10*3/MM3 (ref 1.7–7)
NEUTROPHILS NFR BLD AUTO: 42.2 % (ref 42.7–76)
NRBC BLD AUTO-RTO: 0 /100 WBC (ref 0–0.2)
PLATELET # BLD AUTO: 157 10*3/MM3 (ref 140–450)
PMV BLD AUTO: 12.4 FL (ref 6–12)
POTASSIUM SERPL-SCNC: 3.8 MMOL/L (ref 3.5–5.2)
PROT SERPL-MCNC: 6.4 G/DL (ref 6–8.5)
PROTHROMBIN TIME: 13 SECONDS (ref 11.8–14.9)
RBC # BLD AUTO: 4.33 10*6/MM3 (ref 3.77–5.28)
SODIUM SERPL-SCNC: 143 MMOL/L (ref 136–145)
WBC NRBC COR # BLD AUTO: 3.31 10*3/MM3 (ref 3.4–10.8)

## 2024-02-13 PROCEDURE — 82140 ASSAY OF AMMONIA: CPT

## 2024-02-13 PROCEDURE — 80053 COMPREHEN METABOLIC PANEL: CPT

## 2024-02-13 PROCEDURE — 85610 PROTHROMBIN TIME: CPT

## 2024-02-13 PROCEDURE — 36415 COLL VENOUS BLD VENIPUNCTURE: CPT

## 2024-02-13 PROCEDURE — 82248 BILIRUBIN DIRECT: CPT

## 2024-02-13 PROCEDURE — 82105 ALPHA-FETOPROTEIN SERUM: CPT

## 2024-02-13 PROCEDURE — 85025 COMPLETE CBC W/AUTO DIFF WBC: CPT

## 2024-02-13 PROCEDURE — 86015 ACTIN ANTIBODY EACH: CPT

## 2024-02-14 ENCOUNTER — HOSPITAL ENCOUNTER (OUTPATIENT)
Dept: CT IMAGING | Facility: HOSPITAL | Age: 64
Discharge: HOME | End: 2024-02-14
Payer: COMMERCIAL

## 2024-02-14 DIAGNOSIS — K74.60 CIRRHOSIS OF LIVER WITHOUT ASCITES, UNSPECIFIED HEPATIC CIRRHOSIS TYPE: ICD-10-CM

## 2024-02-14 PROCEDURE — 74177 CT ABD & PELVIS W/CONTRAST: CPT

## 2024-02-14 PROCEDURE — 25510000001 IOPAMIDOL PER 1 ML

## 2024-02-14 RX ADMIN — IOPAMIDOL 100 ML: 755 INJECTION, SOLUTION INTRAVENOUS at 14:32

## 2024-02-15 LAB — SMA IGG SER-ACNC: 32 UNITS (ref 0–19)

## 2024-03-04 RX ORDER — CETIRIZINE HYDROCHLORIDE 10 MG/1
TABLET ORAL
Qty: 30 TABLET | Refills: 3 | Status: SHIPPED | OUTPATIENT
Start: 2024-03-04

## 2024-03-26 ENCOUNTER — OFFICE VISIT (OUTPATIENT)
Dept: PULMONOLOGY | Facility: CLINIC | Age: 64
End: 2024-03-26
Payer: COMMERCIAL

## 2024-03-26 VITALS
OXYGEN SATURATION: 95 % | SYSTOLIC BLOOD PRESSURE: 143 MMHG | RESPIRATION RATE: 16 BRPM | BODY MASS INDEX: 50.02 KG/M2 | WEIGHT: 293 LBS | HEIGHT: 64 IN | DIASTOLIC BLOOD PRESSURE: 62 MMHG | HEART RATE: 62 BPM

## 2024-03-26 DIAGNOSIS — Z87.891 HISTORY OF TOBACCO ABUSE: ICD-10-CM

## 2024-03-26 DIAGNOSIS — F17.210 SMOKING GREATER THAN 40 PACK YEARS: ICD-10-CM

## 2024-03-26 DIAGNOSIS — E66.01 CLASS 3 SEVERE OBESITY DUE TO EXCESS CALORIES WITHOUT SERIOUS COMORBIDITY WITH BODY MASS INDEX (BMI) OF 40.0 TO 44.9 IN ADULT: ICD-10-CM

## 2024-03-26 DIAGNOSIS — J90 CHRONIC PLEURAL EFFUSION: ICD-10-CM

## 2024-03-26 DIAGNOSIS — J30.2 SEASONAL ALLERGIES: ICD-10-CM

## 2024-03-26 DIAGNOSIS — G47.33 OSA ON CPAP: ICD-10-CM

## 2024-03-26 DIAGNOSIS — J30.9 ALLERGIC RHINITIS, UNSPECIFIED SEASONALITY, UNSPECIFIED TRIGGER: Primary | ICD-10-CM

## 2024-03-26 DIAGNOSIS — J43.9 PULMONARY EMPHYSEMA, UNSPECIFIED EMPHYSEMA TYPE: ICD-10-CM

## 2024-03-26 DIAGNOSIS — R91.1 LUNG NODULE: ICD-10-CM

## 2024-03-26 PROCEDURE — 3078F DIAST BP <80 MM HG: CPT | Performed by: INTERNAL MEDICINE

## 2024-03-26 PROCEDURE — 99214 OFFICE O/P EST MOD 30 MIN: CPT | Performed by: INTERNAL MEDICINE

## 2024-03-26 PROCEDURE — 1160F RVW MEDS BY RX/DR IN RCRD: CPT | Performed by: INTERNAL MEDICINE

## 2024-03-26 PROCEDURE — 3077F SYST BP >= 140 MM HG: CPT | Performed by: INTERNAL MEDICINE

## 2024-03-26 PROCEDURE — 1159F MED LIST DOCD IN RCRD: CPT | Performed by: INTERNAL MEDICINE

## 2024-03-26 RX ORDER — BUDESONIDE 0.5 MG/2ML
0.5 INHALANT ORAL
Qty: 60 EACH | Refills: 11 | Status: SHIPPED | OUTPATIENT
Start: 2024-03-26

## 2024-03-26 RX ORDER — ALBUTEROL SULFATE 90 UG/1
2 AEROSOL, METERED RESPIRATORY (INHALATION) EVERY 4 HOURS PRN
Qty: 1 G | Refills: 11 | Status: SHIPPED | OUTPATIENT
Start: 2024-03-26

## 2024-03-26 NOTE — PROGRESS NOTES
Primary Care Provider  Jaleesa Piper APRN     Referring Provider  No ref. provider found     Chief Complaint  Follow-up (6 month fu), Emphysema, Lung Nodule, Shortness of Breath, Wheezing, and Cough    Subjective          Chiara Zee presents to [facility]  History of Present Illness  Chiara Zee is a 63 y.o. female patient with history of recurrent exudative pleural effusion, right lower lobe pulmonary nodule 0.2 cm in size, former tobacco abuse of cigarettes in remission, obesity, obstructive sleep apnea.  She is here for follow-up.  Since her last office visit, patient has continued to gain weight..  She is short of breath with minimal exertion.  Even walking from kitchen to the bedroom, which is less than 25 feet, she is out of breath.  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  She has rescue inhaler at home, uses it 2-3 times a day.  She is not on any inhaled steroids.  Her roommate smokes around her.  She used to smoke heavily but quit smoking 5 years ago.  She also had pleural effusion which has not been tapped for the last 1-1/2 years at the very least.  She has no cough, no wheezing.  She is a compliant with her CPAP machine, uses full facemask.  Her CPAP supplies is from [employer].  She already had flu and pneumonia shot, is due for booster in Covid shot.  She took Covid shots in the spring.  She is due for low-dose lung cancer CT scan of the chest in June 2024.    Review of Systems     General:  Fatigue, No Fever No weight loss or loss of appetite  HEENT: No dysphagia, No Visual Changes, no rhinorrhea  Respiratory:  + Minimal cough,+Dyspnea, no phlegm, No Pleuritic Pain, no wheezing, no hemoptysis.  Cardiovascular: Denies chest pain, denies palpitations,+JHAVERI, Chest Pressure  Gastrointestinal:  No Abdominal Pain, No Nausea, No Vomiting, No Diarrhea  Genitourinary:  No Dysuria, No Frequency, No  Hesitancy  Musculoskeletal: No muscle pain or swelling  Endocrine:  No Heat Intolerance, No Cold Intolerance, No Fatigue  Hematologic:  No Bleeding, No Bruising  Psychiatric:  No Anxiety, No Depression  Neurologic:  No Confusion, no Dysarthria, No Headaches  Skin:  No Rash, No Open Wounds    Family History   Problem Relation Age of Onset    Prostate cancer Father     Diabetes Sister     Diabetes Brother     [place] Hyperthermia Neg Hx     Colon cancer Neg Hx         Social History     Socioeconomic History    Marital status:    Tobacco Use    Smoking status: Former     Current packs/day: 0.00     Average packs/day: 1 pack/day for 45.0 years (45.0 ttl pk-yrs)     Types: Cigarettes     Start date:      Quit date:      Years since quittin.2     Passive exposure: Never    Smokeless tobacco: Never   Vaping Use    Vaping status: Never Used   Substance and Sexual Activity    Alcohol use: Never    Drug use: Never    Sexual activity: Defer        Past Medical History:   Diagnosis Date    Alpha-1-antitrypsin deficiency     Arthritis     COPD (chronic obstructive pulmonary disease)     INHALERS PRN    Coronary artery disease     FOLLOWS W/ELSHIEKH, NO C/O CP OR  SOA    Elevated cholesterol     History of thoracentesis     Hyperlipidemia     Hypertension     Myocardial infarction     PT STATES UNKNOWN DATE    Sleep apnea     Umbilical hernia without obstruction and without gangrene         Immunization History   Administered Date(s) Administered    ABRYSVO (RSV, 60+ or pregnant women 32-36 wks) 2024    COVID-19 (PFIZER) BIVALENT 12+YRS 10/27/2022    COVID-19 (PFIZER) Purple Cap Monovalent 2021, 2021, 2022    COVID-19 F23 (PFIZER) 12YRS+ (COMIRNATY) 2023    Flu Vaccine Quad PF >36MO 10/28/2020, 10/28/2021    Fluzone (or Fluarix & Flulaval for VFC) >6mos 10/28/2020, 10/28/2021, 10/27/2022, 10/24/2023    Fluzone High Dose =>65 Years ([employer] ONLY) 2021    Influenza, Unspecified  10/28/2022    Pneumococcal Conjugate 20-Valent (PCV20) 12/08/2022    Pneumococcal Polysaccharide (PPSV23) 07/16/2020    Shingrix 07/07/2021, 11/02/2021    Tdap 07/16/2020         No Known Allergies       Current Outpatient Medications:     albuterol sulfate  (90 Base) MCG/ACT inhaler, Inhale 2 puffs Every 4 (Four) Hours As Needed for Wheezing., Disp: 1 g, Rfl: 11    atorvastatin (LIPITOR) 20 MG tablet, Take 1 tablet by mouth Every Night., Disp: , Rfl:     busPIRone (BUSPAR) 10 MG tablet, Take 1 tablet by mouth 3 (Three) Times a Day. TAKES FIRST DOSE LATER IN THE DAY, Disp: , Rfl:     cetirizine (zyrTEC) 10 MG tablet, TAKE ONE TABLET BY MOUTH EVERY DAY, Disp: 30 tablet, Rfl: 3    FLUoxetine (PROzac) 20 MG capsule, Take 1 capsule by mouth Daily., Disp: , Rfl:     HYDROcodone-acetaminophen (NORCO) 5-325 MG per tablet, Take 1-2 tablets by mouth Every 4 (Four) Hours As Needed (Pain)., Disp: 20 tablet, Rfl: 0    ibandronate (BONIVA) 150 MG tablet, Take 1 tablet by mouth Every 30 (Thirty) Days., Disp: , Rfl:     meloxicam (MOBIC) 15 MG tablet, Take 1 tablet by mouth Daily., Disp: , Rfl:     metoprolol tartrate (LOPRESSOR) 25 MG tablet, Take 1 tablet by mouth Daily., Disp: , Rfl:     mometasone (ELOCON) 0.1 % cream, Apply 1 Application topically to the appropriate area as directed Daily., Disp: , Rfl:     montelukast (SINGULAIR) 10 MG tablet, , Disp: , Rfl:     nystatin (MYCOSTATIN) 795314 UNIT/GM powder, Apply 1 Application topically to the appropriate area as directed 3 (Three) Times a Day., Disp: , Rfl:     pantoprazole (PROTONIX) 40 MG EC tablet, Take 1 tablet by mouth Every Night., Disp: , Rfl:     sertraline (ZOLOFT) 50 MG tablet, Take 1 tablet by mouth Daily., Disp: , Rfl:     tiotropium bromide-olodaterol (STIOLTO RESPIMAT) 2.5-2.5 MCG/ACT aerosol solution inhaler, Inhale 2 puffs Daily., Disp: 1 each, Rfl: 11    Vibegron (Gemtesa) 75 MG tablet, Take 1 tablet by mouth Daily., Disp: 90 tablet, Rfl: 3    Xarelto  "2.5 MG tablet, Take 1 tablet by mouth 2 (Two) Times a Day With Meals. LAST DOSE A.M. 07/30/22 AS INSTRUCTED BY DR. COLLINS, Disp: , Rfl:     budesonide (Pulmicort) 0.5 MG/2ML nebulizer solution, Take 2 mL by nebulization Daily., Disp: 60 each, Rfl: 11    butalbital-acetaminophen-caffeine (FIORICET, ESGIC) -40 MG per tablet, Take 1 tablet by mouth Every 6 (Six) Hours As Needed for Headache. (Patient not taking: Reported on 3/26/2024), Disp: 12 tablet, Rfl: 0     Objective   Vital Signs:   /62 (BP Location: Left arm, Patient Position: Sitting, Cuff Size: Large Adult)   Pulse 62   Resp 16   Ht 162.6 cm (64\")   Wt 135 kg (298 lb 4.8 oz)   SpO2 95% Comment: room air  BMI 51.20 kg/m²     M[place]atti classification : 1  Physical Exam  Vital Signs Reviewed  Morbidly obese female, in no acute distress, normal conversant  HEENT:  PERRL, EOMI.  OP, nares clear, no sinus tenderness  Neck:  Supple, no JVD, no thyromegaly  Lymph: no axillary, cervical, supraclavicular lymphadenopathy noted bilaterally  Chest:  good aeration, bilateral diminished breath sounds, no wheezing, crackles or rhonchi, resonant to percussion b/l  CV: RRR, no MGR, pulses 2+, equal.  Abd:  Soft, NT, ND, + BS, no HSM  EXT:  no clubbing, no cyanosis, No BLE edema  Neuro:  A&Ox3, CN grossly intact, no focal deficits.  Skin: No rashes or lesions noted     Result Review :   The following data was reviewed by: Hosea Garcia MD on 12/15/2021:  Common labs          7/14/2023    14:17 10/24/2023    10:27 2/13/2024    14:44   Common Labs   Glucose   95    BUN   8    Creatinine   0.84    Sodium   143    Potassium   3.8    Chloride   107    Calcium   9.7    Albumin 4.5   4.7    Total Bilirubin 0.6   0.7    Alkaline Phosphatase 90   85    AST (SGOT) 29   29    ALT (SGPT) 32   28    WBC 3.90  3.96     3.31    Hemoglobin 13.6  13.1     13.2    Hematocrit 41.1  40.0     39.7    Platelets 162  149     157       Details          This result is from an " external source.             CMP          7/14/2023    14:17 2/13/2024    14:44   CMP   Glucose  95    BUN  8    Creatinine  0.84    EGFR  77.7    Sodium  143    Potassium  3.8    Chloride  107    Calcium  9.7    Total Protein 6.7  6.4    Albumin 4.5  4.7    Globulin  1.7    Total Bilirubin 0.6  0.7    Alkaline Phosphatase 90  85    AST (SGOT) 29  29    ALT (SGPT) 32  28    Albumin/Globulin Ratio  2.8    BUN/Creatinine Ratio  9.5    Anion Gap  11.0      CBC          7/14/2023    14:17 10/24/2023    10:27 2/13/2024    14:44   CBC   WBC 3.90  3.96     3.31    RBC 4.38  4.20     4.33    Hemoglobin 13.6  13.1     13.2    Hematocrit 41.1  40.0     39.7    MCV 93.8  95.2     91.7    MCH 31.1  31.2     30.5    MCHC 33.1  32.8     33.2    RDW 12.5  13.4     13.2    Platelets 162  149     157       Details          This result is from an external source.               Data reviewed: Radiologic studies Previous CT scan of the chest from November 2020 was reviewed.  Shows improving pleural effusion, stable lung nodule.             Assessment and Plan    Diagnoses and all orders for this visit:    1. Allergic rhinitis, unspecified seasonality, unspecified trigger (Primary)  -     Home Nebulizer  -     budesonide (Pulmicort) 0.5 MG/2ML nebulizer solution; Take 2 mL by nebulization Daily.  Dispense: 60 each; Refill: 11  -     albuterol sulfate  (90 Base) MCG/ACT inhaler; Inhale 2 puffs Every 4 (Four) Hours As Needed for Wheezing.  Dispense: 1 g; Refill: 11    2. Seasonal allergies  -     Home Nebulizer  -     budesonide (Pulmicort) 0.5 MG/2ML nebulizer solution; Take 2 mL by nebulization Daily.  Dispense: 60 each; Refill: 11    3. Class 3 severe obesity due to excess calories without serious comorbidity with body mass index (BMI) of 40.0 to 44.9 in adult  -     Home Nebulizer  -     budesonide (Pulmicort) 0.5 MG/2ML nebulizer solution; Take 2 mL by nebulization Daily.  Dispense: 60 each; Refill: 11    4. Pulmonary  emphysema, unspecified emphysema type  -     Home Nebulizer  -     budesonide (Pulmicort) 0.5 MG/2ML nebulizer solution; Take 2 mL by nebulization Daily.  Dispense: 60 each; Refill: 11  -     albuterol sulfate  (90 Base) MCG/ACT inhaler; Inhale 2 puffs Every 4 (Four) Hours As Needed for Wheezing.  Dispense: 1 g; Refill: 11    5. Lung nodule  -     Home Nebulizer  -     budesonide (Pulmicort) 0.5 MG/2ML nebulizer solution; Take 2 mL by nebulization Daily.  Dispense: 60 each; Refill: 11    6. Chronic pleural effusion  -     Home Nebulizer  -     budesonide (Pulmicort) 0.5 MG/2ML nebulizer solution; Take 2 mL by nebulization Daily.  Dispense: 60 each; Refill: 11    7. SAMI on CPAP  -     Home Nebulizer  -     budesonide (Pulmicort) 0.5 MG/2ML nebulizer solution; Take 2 mL by nebulization Daily.  Dispense: 60 each; Refill: 11    8. History of tobacco abuse  -     Home Nebulizer  -     budesonide (Pulmicort) 0.5 MG/2ML nebulizer solution; Take 2 mL by nebulization Daily.  Dispense: 60 each; Refill: 11    9. Smoking greater than 40 pack years  -     Home Nebulizer  -     budesonide (Pulmicort) 0.5 MG/2ML nebulizer solution; Take 2 mL by nebulization Daily.  Dispense: 60 each; Refill: 11        Lung nodule with stable eccentric calcification.  It was benign.  She is due for low-dose lung cancer screening CT scan of the chest in June 2024.  Previously, pleural effusion was stable.  She has repeat CT scan of the chest scheduled for June 2024.    COPD: Continue with his Stiolto inhaler once daily..  Pulmonary function test showed some reversibility.  I will give her home nebulizer machine and start Pulmicort nebulizer twice daily.    Obstructive sleep apnea: Continue with CPAP.  She is compliant and and and with it.  Weight loss counseling was done.  She has gained 15 pounds since her last office visit.    She is due for Covid booster shot.  She is up-to-date on flu and pneumonia vaccine.    Continue with Singulair and  Zyrtec.    May need to repeat PFT after next office visit.      Follow Up   Return in about 4 months (around 7/26/2024).  Patient was given instructions and counseling regarding her condition or for health maintenance advice. Please see specific information pulled into the AVS if appropriate.       Electronically signed by Hosea Garcia MD, 3/26/2024, 17:03 EDT.

## 2024-04-12 ENCOUNTER — OFFICE VISIT (OUTPATIENT)
Dept: UROLOGY | Facility: CLINIC | Age: 64
End: 2024-04-12
Payer: COMMERCIAL

## 2024-04-12 VITALS
BODY MASS INDEX: 49.82 KG/M2 | HEIGHT: 64 IN | SYSTOLIC BLOOD PRESSURE: 136 MMHG | WEIGHT: 291.8 LBS | DIASTOLIC BLOOD PRESSURE: 62 MMHG | HEART RATE: 69 BPM

## 2024-04-12 DIAGNOSIS — N39.41 URGE INCONTINENCE OF URINE: ICD-10-CM

## 2024-04-12 DIAGNOSIS — N39.41 URGE INCONTINENCE: Primary | ICD-10-CM

## 2024-04-12 LAB
BILIRUB BLD-MCNC: NEGATIVE MG/DL
CLARITY, POC: CLEAR
COLOR UR: YELLOW
EXPIRATION DATE: ABNORMAL
GLUCOSE UR STRIP-MCNC: NEGATIVE MG/DL
KETONES UR QL: NEGATIVE
LEUKOCYTE EST, POC: NEGATIVE
Lab: ABNORMAL
NITRITE UR-MCNC: NEGATIVE MG/ML
PH UR: 5.5 [PH] (ref 5–8)
PROT UR STRIP-MCNC: ABNORMAL MG/DL
RBC # UR STRIP: ABNORMAL /UL
SP GR UR: 1.03 (ref 1–1.03)
URINE VOLUME: 0 ML
UROBILINOGEN UR QL: ABNORMAL

## 2024-04-12 RX ORDER — VIBEGRON 75 MG/1
75 TABLET, FILM COATED ORAL DAILY
Qty: 90 TABLET | Refills: 3 | Status: SHIPPED | OUTPATIENT
Start: 2024-04-12

## 2024-04-12 NOTE — PROGRESS NOTES
Chief Complaint: Urge incontinence of urine    Subjective         History of Present Illness  Chiara Zee is a 63 y.o. female presents to [facility] UROLOG[facility] to be seen for f/u Urinary incontinence.    She has remained on gemtesa.    Nocturia x 0    She denies significant urgency or frequency.     Only will have leakage if she delays the urge.     No longer wearing pads.      Previous:  She was placed on myrbetriq 50 mg q day at last visit.     She states she has seen no change in symptoms.    She states she has been with itching to her head as well.    She continues to have bothersome leakage and urgency with frequency.    She has been on oxybutynin 5 mg daily for the last 8 months to see if this would help to alleviate her urgency and frequency as well as urge urinary incontinence.    She states her symptoms worsened in the last month.     She states she is still with urge incontinence.    She has had more falls recently.    She feels as if she has had some cognitive decline and skin itching.      The patient has continued to decrease caffeine intake.    At last visit we discussed that her Caffeine intake may be contributing to her symptoms.    She has cut out caffeine and is only drinking pink lemonade.    She states that her leakage has improved but not gone entirely.      The patient states that she will not have the urge to go and will stand up then will have full bladder leakage and at times will get the urge to go but cannot make it.    She drinks coffee 20 oz and pepsi zero x2 daily even during the night.     She has nocturia x 1 rarely 2x     She voids every hour during the day.    She does have leakage with cough laugh and sneeze, but not as bothersome.    She has not had  Hysterectomy.    Objective     Past Medical History:   Diagnosis Date    Alpha-1-antitrypsin deficiency     Arthritis     COPD (chronic obstructive pulmonary disease)     INHALERS PRN    Coronary artery  disease     FOLLOWS W/ELSHIEKH, NO C/O CP OR  SOA    Elevated cholesterol     History of thoracentesis     Hyperlipidemia     Hypertension     Myocardial infarction     PT STATES UNKNOWN DATE    Sleep apnea     Umbilical hernia without obstruction and without gangrene        Past Surgical History:   Procedure Laterality Date    BREAST BIOPSY Left      SECTION      COLONOSCOPY      ENDOSCOPY      ENDOSCOPY N/A 2023    Procedure: ESOPHAGOGASTRODUODENOSCOPY WITH BIOPSIES;  Surgeon: Gabriel Rankin MD;  Location: [facility] ENDOSCOPY;  Service: Gastroenterology;  Laterality: N/A;  HIATAL HERNIA, REFLUX ESOPHAGITIS    OTHER SURGICAL HISTORY      right wrist fracture, plate and screws    UPPER GASTROINTESTINAL ENDOSCOPY      VENTRAL HERNIA REPAIR N/A 2022    Procedure: Robotic Umbilical Hernia Repair with Mesh Placement;  Surgeon: Epifanio Morales MD;  Location: [facility] MAIN OR;  Service: Robotics - DaVinci;  Laterality: N/A;         Current Outpatient Medications:     albuterol sulfate  (90 Base) MCG/ACT inhaler, Inhale 2 puffs Every 4 (Four) Hours As Needed for Wheezing., Disp: 1 g, Rfl: 11    atorvastatin (LIPITOR) 20 MG tablet, Take 1 tablet by mouth Every Night., Disp: , Rfl:     budesonide (Pulmicort) 0.5 MG/2ML nebulizer solution, Take 2 mL by nebulization Daily., Disp: 60 each, Rfl: 11    busPIRone (BUSPAR) 10 MG tablet, Take 1 tablet by mouth 3 (Three) Times a Day. TAKES FIRST DOSE LATER IN THE DAY, Disp: , Rfl:     cetirizine (zyrTEC) 10 MG tablet, TAKE ONE TABLET BY MOUTH EVERY DAY, Disp: 30 tablet, Rfl: 3    FLUoxetine (PROzac) 20 MG capsule, Take 1 capsule by mouth Daily., Disp: , Rfl:     HYDROcodone-acetaminophen (NORCO) 5-325 MG per tablet, Take 1-2 tablets by mouth Every 4 (Four) Hours As Needed (Pain)., Disp: 20 tablet, Rfl: 0    ibandronate (BONIVA) 150 MG tablet, Take 1 tablet by mouth Every 30 (Thirty) Days., Disp: , Rfl:     meloxicam (MOBIC) 15 MG tablet, Take 1 tablet  "by mouth Daily., Disp: , Rfl:     metoprolol tartrate (LOPRESSOR) 25 MG tablet, Take 1 tablet by mouth Daily., Disp: , Rfl:     mometasone (ELOCON) 0.1 % cream, Apply 1 Application topically to the appropriate area as directed Daily., Disp: , Rfl:     montelukast (SINGULAIR) 10 MG tablet, , Disp: , Rfl:     nystatin (MYCOSTATIN) 498853 UNIT/GM powder, Apply 1 Application topically to the appropriate area as directed 3 (Three) Times a Day., Disp: , Rfl:     pantoprazole (PROTONIX) 40 MG EC tablet, Take 1 tablet by mouth Every Night., Disp: , Rfl:     sertraline (ZOLOFT) 50 MG tablet, Take 1 tablet by mouth Daily., Disp: , Rfl:     tiotropium bromide-olodaterol (STIOLTO RESPIMAT) 2.5-2.5 MCG/ACT aerosol solution inhaler, Inhale 2 puffs Daily., Disp: 1 each, Rfl: 11    Vibegron (Gemtesa) 75 MG tablet, Take 1 tablet by mouth Daily., Disp: 90 tablet, Rfl: 3    Xarelto 2.5 MG tablet, Take 1 tablet by mouth 2 (Two) Times a Day With Meals. LAST DOSE A.M. 22 AS INSTRUCTED BY DR. COLLINS, Disp: , Rfl:     No Known Allergies     Family History   Problem Relation Age of Onset    Prostate cancer Father     Diabetes Sister     Diabetes Brother     Malig Hyperthermia Neg Hx     Colon cancer Neg Hx        Social History     Socioeconomic History    Marital status:    Tobacco Use    Smoking status: Former     Current packs/day: 0.00     Average packs/day: 1 pack/day for 45.0 years (45.0 ttl pk-yrs)     Types: Cigarettes     Start date:      Quit date: 2016     Years since quittin.2     Passive exposure: Never    Smokeless tobacco: Never   Vaping Use    Vaping status: Never Used   Substance and Sexual Activity    Alcohol use: Never    Drug use: Never    Sexual activity: Defer       Vital Signs:   /62   Pulse 69   Ht 162.6 cm (64\")   Wt 132 kg (291 lb 12.8 oz)   BMI 50.09 kg/m²      Physical Exam     Result Review :   The following data was reviewed by: DANI Rivas on 2024:  Results " for orders placed or performed in visit on 04/12/24   Bladder Scan   Result Value Ref Range    Urine Volume 0 ml   POC Urinalysis Dipstick, Automated    Specimen: Urine   Result Value Ref Range    Color Yellow Yellow, Straw, Dark Yellow, Carla    Clarity, UA Clear Clear    Specific Gravity  1.030 (A) 1.005 - 1.030    pH, Urine 5.5 5.0 - 8.0    Leukocytes Negative Negative    Nitrite, UA Negative Negative    Protein, POC Trace (A) Negative mg/dL    Glucose, UA Negative Negative mg/dL    Ketones, UA Negative Negative    Urobilinogen, UA 0.2 E.U./dL Normal, 0.2 E.U./dL    Bilirubin Negative Negative    Blood, UA Trace (A) Negative    Lot Number [id number]     Expiration Date 2,282,025           Bladder Scan interpretation 04/12/2024    Estimation of residual urine via [employer] 3000 Verathon Bladder Scan  MA/nurse performing: prakash NETTLES   Residual Urine: 0 ml  Indication: Urge incontinence    Urge incontinence of urine   Position: Supine  Examination: Incremental scanning of the suprapubic area using 2.0 MHz transducer using copious amounts of acoustic gel.   Findings: An anechoic area was demonstrated which represented the bladder, with measurement of residual urine as noted. I inspected this myself. In that the residual urine was stable or insignificant, refer to plan for treatment and plan necessary at this time.              Procedures        Assessment and Plan    Diagnoses and all orders for this visit:    1. Urge incontinence (Primary)  -     Bladder Scan  -     POC Urinalysis Dipstick, Automated    2. Urge incontinence of urine  -     Vibegron (Gemtesa) 75 MG tablet; Take 1 tablet by mouth Daily.  Dispense: 90 tablet; Refill: 3        Patient is doing well on Gemtesa we will continue her on this and follow-up with her in 1 year or sooner if needed.    She will call with any new or worsening symptoms.    She will call with s/s pf UTI for outpatient cx       I spent  10 minutes caring for Chiara on this date of service. This  time includes time spent by me in the following activities:reviewing tests, obtaining and/or reviewing a separately obtained history, performing a medically appropriate examination and/or evaluation , counseling and educating the patient/family/caregiver, ordering medications, tests, or procedures, and documenting information in the medical record  Follow Up   Return in about 1 year (around 4/12/2025) for annual f/u urinary incontiencne with PVR.  Patient was given instructions and counseling regarding her condition or for health maintenance advice. Please see specific information pulled into the AVS if appropriate.         This document has been electronically signed by DANI Rivas  April 12, 2024 10:27 EDT

## 2024-06-03 ENCOUNTER — OFFICE VISIT (OUTPATIENT)
Dept: SLEEP MEDICINE | Facility: HOSPITAL | Age: 64
End: 2024-06-03
Payer: COMMERCIAL

## 2024-06-03 VITALS
WEIGHT: 286.7 LBS | HEIGHT: 64 IN | HEART RATE: 59 BPM | OXYGEN SATURATION: 95 % | BODY MASS INDEX: 48.94 KG/M2 | DIASTOLIC BLOOD PRESSURE: 55 MMHG | SYSTOLIC BLOOD PRESSURE: 137 MMHG

## 2024-06-03 DIAGNOSIS — G47.33 OSA ON CPAP: Primary | ICD-10-CM

## 2024-06-03 DIAGNOSIS — G47.21 CIRCADIAN RHYTHM SLEEP DISORDER, DELAYED SLEEP PHASE TYPE: ICD-10-CM

## 2024-06-03 PROCEDURE — 99213 OFFICE O/P EST LOW 20 MIN: CPT | Performed by: INTERNAL MEDICINE

## 2024-06-03 PROCEDURE — 1160F RVW MEDS BY RX/DR IN RCRD: CPT | Performed by: INTERNAL MEDICINE

## 2024-06-03 PROCEDURE — 1159F MED LIST DOCD IN RCRD: CPT | Performed by: INTERNAL MEDICINE

## 2024-06-03 PROCEDURE — 3078F DIAST BP <80 MM HG: CPT | Performed by: INTERNAL MEDICINE

## 2024-06-03 PROCEDURE — 3075F SYST BP GE 130 - 139MM HG: CPT | Performed by: INTERNAL MEDICINE

## 2024-06-03 PROCEDURE — G0463 HOSPITAL OUTPT CLINIC VISIT: HCPCS

## 2024-06-03 RX ORDER — HYDROXYZINE HYDROCHLORIDE 25 MG/1
25 TABLET, FILM COATED ORAL EVERY 8 HOURS PRN
COMMUNITY

## 2024-06-03 NOTE — PROGRESS NOTES
"  [address] [address]  [place]   [place] [address]  Phone: [phone]  Fax: [phone]      SLEEP CLINIC FOLLOW UP PROGRESS NOTE.    Chiara Zee  [id number]   1960  63 y.o.  female      PCP: Jaleesa Piper APRN      Date of visit: 6/3/2024    Chief Complaint   Patient presents with    Sleep Apnea    Obesity       HPI:  This is a 63 y.o. years old patient is here for the management of obstructive sleep apnea.  Sleep apnea is moderate in severity with a AHI of 28/hr. Patient is using positive airway pressure therapy with CPAP 12 and the symptoms of sleep apnea have improved significantly on the therapy. Normally patient goes to bed at 5 AM and wakes up at 11 AM .  The patient wakes up 2 time(s) during the night and has no problem going back to sleep.  Feels refreshed after waking up.       Medications and allergies are reviewed by me and documented in the encounter.     SOCIAL (habits pertaining to sleep medicine)  History tobacco use:No   History of alcohol use: 0 per week  Caffeine use: 3     REVIEW OF SYSTEMS:   Pertaining positive symptoms are:  Hazard Sleepiness Scale :Total score: 1   Anxiety  Depression      PHYSICAL EXAMINATION:  CONSTITUTIONAL:  Vitals:    06/03/24 0900   BP: 137/55   Pulse: 59   SpO2: 95%   Weight: 130 kg (286 lb 11.2 oz)   Height: 162.6 cm (64.02\")    Body mass index is 49.19 kg/m².   NOSE: nasal passages are clear, No deformities noted   RESP SYSTEM: Not in any respiratory distress, no chest deformities noted,   CARDIOVASULAR: No edema noted  NEURO: Oriented x 3, gait normal,  Mood and affect appeared appropriate      Data reviewed:  The Smart card downloaded on 6/3/2024 has been reviewed independently by me for compliance and discussed the data with the patient.   Compliance; 100%  More than 4 hr use, 100%  Average use of the device 7 hours and 56 minutes per night  Residual AHI: 1.4 /hr (goal < 5.0 /hr)  Mask type: Nasal " pillow  Device: ResMed  DME: [employer]      ASSESSMENT AND PLAN:  Obstructive sleep apnea ( G 47.33).  The symptoms of sleep apnea have improved with the device and the treatment.  Patient's compliance with the device is excellent for treatment of sleep apnea.  I have independently reviewed the smart card down load and discussed with the patient the download data and encouarged the patient to continue to use the device.The residual AHI is acceptable. The device is benefiting the patient and the device is medically necessary.  Without proper control of sleep apnea and good compliance there is a increased risk for hypertension, diabetes mellitus and nonrestorative sleep with hypersomnia which can increase risk for motor vehicle accidents.  Untreated sleep apnea is also a risk factor for development of atrial fibrillation, pulmonary hypertension, insulin resistance and stroke. The patient is also instructed to get the supplies from the DME company and and change them on a regular basis.  A prescription for supplies has been sent to the DME company.  I have also discussed the good sleep hygiene habits and adequate amount of sleep needed for good health.  Obesity  3 with BMI is Body mass index is 49.19 kg/m².. I have discuss the relationship between the weight and sleep apnea. The benefit of weight loss in reducing severity of sleep apnea was discussed. Discussed diet and exercise with the patient to achieve ideal BMI.  Delayed sleep phase syndrome.  Again I have educated the patient about the syndrome and she needs to get back on a regular sleep schedule.  Return in about 1 year (around 6/3/2025) for with smart card down load. . Patient's questions were answered.    6/3/2024  Noa Sparks MD  Sleep Medicine.  Medical Director,   [facility], [facility] sleep [facility].

## 2024-06-17 DIAGNOSIS — F17.210 SMOKING GREATER THAN 40 PACK YEARS: ICD-10-CM

## 2024-06-17 DIAGNOSIS — J43.9 PULMONARY EMPHYSEMA, UNSPECIFIED EMPHYSEMA TYPE: ICD-10-CM

## 2024-06-17 RX ORDER — TIOTROPIUM BROMIDE AND OLODATEROL 3.124; 2.736 UG/1; UG/1
2 SPRAY, METERED RESPIRATORY (INHALATION) DAILY
Qty: 4 G | Refills: 11 | Status: SHIPPED | OUTPATIENT
Start: 2024-06-17

## 2024-07-02 RX ORDER — CETIRIZINE HYDROCHLORIDE 10 MG/1
TABLET ORAL
Qty: 90 TABLET | Refills: 3 | Status: SHIPPED | OUTPATIENT
Start: 2024-07-02

## 2024-07-29 DIAGNOSIS — F17.210 SMOKING GREATER THAN 40 PACK YEARS: Primary | ICD-10-CM

## 2024-09-03 ENCOUNTER — HOSPITAL ENCOUNTER (OUTPATIENT)
Dept: CT IMAGING | Facility: HOSPITAL | Age: 64
Discharge: HOME | End: 2024-09-03
Admitting: NURSE PRACTITIONER
Payer: MEDICARE

## 2024-09-03 DIAGNOSIS — F17.210 SMOKING GREATER THAN 40 PACK YEARS: ICD-10-CM

## 2024-09-03 PROCEDURE — 71271 CT THORAX LUNG CANCER SCR C-: CPT

## 2024-09-05 DIAGNOSIS — F17.211 CIGARETTE NICOTINE DEPENDENCE IN REMISSION: Primary | ICD-10-CM

## 2024-10-17 ENCOUNTER — HOSPITAL ENCOUNTER (OUTPATIENT)
Dept: OTHER | Facility: HOSPITAL | Age: 64
Discharge: HOME | End: 2024-10-17

## 2024-10-31 NOTE — PROGRESS NOTES
Primary Care Provider  Jaleesa Piper APRN   Referring Provider  No ref. provider found    Patient Complaint  Sleep Apnea, Emphysema, Lung Nodule, Pulmonary Aneurysm, and Shortness of Breath      Subjective       History of Presenting Illness  Chiara Zee is a pleasant 64 y.o. female who presents to [facility] for follow-up appointment.  Patient last saw Dr. Garcia 3/26/2024.  Patient has history of recurrent exudative pleural effusion, right lower lobe pulmonary nodule 0.2 cm in size, former tobacco abuse of cigarettes in remission, obesity, obstructive sleep apnea. She is a compliant with her CPAP machine, uses full facemask, and benefits from its use. Her CPAP supplies is from [employer].  She continues on Pulmicort, Stiolto, and albuterol.  Her low-dose lung cancer screening from 9/3/2024 revealed no suspicious pulmonary nodules with recommendation for annual low-dose CT in 1 year.  Order has been placed.  Patient states she also had a CT scan low-dose lung cancer screening by her PCP at [facility] on 10/17/2024 which showed a 4 cm ascending aortic aneurysm.  Patient states she is under the care of Dr. Kei Dennis though she has not seen him in a while.  At present time she states she is doing very well her breathing is at baseline.  She is on Stiolto budesonide and albuterol.  She states that Stiolto is very expensive for her.  She does have some samples at home she is using.  She states she has had no hospitalizations or urgent care visits for her lungs since her last visit.  She does have shortness of air with exertional activities of moderate severity but she improves with rest. At present time patient denies dyspnea, coughing, wheezing, headaches, chest pain, weight loss or hemoptysis. Patient denies fevers, chills and night sweats. Chiara Zee is able to perform ADLs without difficulties.    I have personally reviewed the  review of systems, past family, social, medical and surgical histories; and agree with their findings.      Review of Systems   Constitutional: Negative.    HENT: Negative.     Respiratory:  Positive for shortness of breath.    Cardiovascular: Negative.    Musculoskeletal: Negative.    Neurological: Negative.    Psychiatric/Behavioral: Negative.           Family History   Problem Relation Age of Onset    Prostate cancer Father     Diabetes Sister     Diabetes Brother     [place] Hyperthermia Neg Hx     Colon cancer Neg Hx         Social History     Socioeconomic History    Marital status:    Tobacco Use    Smoking status: Former     Current packs/day: 0.00     Average packs/day: 1 pack/day for 45.0 years (45.0 ttl pk-yrs)     Types: Cigarettes     Start date:      Quit date:      Years since quittin.8     Passive exposure: Never    Smokeless tobacco: Never   Vaping Use    Vaping status: Never Used   Substance and Sexual Activity    Alcohol use: Never    Drug use: Never    Sexual activity: Defer        Past Medical History:   Diagnosis Date    Alpha-1-antitrypsin deficiency     Arthritis     COPD (chronic obstructive pulmonary disease)     INHALERS PRN    Coronary artery disease     FOLLOWS W/ELSHIEKH, NO C/O CP OR  SOA    Elevated cholesterol     History of thoracentesis     Hyperlipidemia     Hypertension     Myocardial infarction     PT STATES UNKNOWN DATE    Sleep apnea     Umbilical hernia without obstruction and without gangrene         Immunization History   Administered Date(s) Administered    ABRYSVO (RSV, 60+ or pregnant women 32-36 wks) 2024    COVID-19 (PFIZER) 12YRS+ (COMIRNATY) 2023    COVID-19 (PFIZER) BIVALENT 12+YRS 10/27/2022    COVID-19 (PFIZER) Purple Cap Monovalent 2021, 2021, 2022    Flu Vaccine Quad PF >36MO 10/28/2020, 10/28/2021    Fluzone (or Fluarix & Flulaval for VFC) >6mos 10/28/2020, 10/28/2021, 10/27/2022, 10/24/2023    Fluzone High-Dose  65+YRS 09/28/2021, 10/10/2024    Influenza, Unspecified 10/28/2022    Pneumococcal Conjugate 20-Valent (PCV20) 12/08/2022    Pneumococcal Polysaccharide (PPSV23) 07/16/2020    Shingrix 07/07/2021, 11/02/2021    Tdap 07/16/2020       No Known Allergies       Current Outpatient Medications:     albuterol sulfate  (90 Base) MCG/ACT inhaler, Inhale 2 puffs Every 4 (Four) Hours As Needed for Wheezing., Disp: 1 g, Rfl: 11    atorvastatin (LIPITOR) 20 MG tablet, Take 1 tablet by mouth Every Night., Disp: , Rfl:     budesonide (Pulmicort) 0.5 MG/2ML nebulizer solution, Take 2 mL by nebulization Daily., Disp: 60 each, Rfl: 11    busPIRone (BUSPAR) 10 MG tablet, Take 1 tablet by mouth 3 (Three) Times a Day. TAKES FIRST DOSE LATER IN THE DAY, Disp: , Rfl:     cetirizine (zyrTEC) 10 MG tablet, TAKE ONE TABLET BY MOUTH EVERY DAY, Disp: 90 tablet, Rfl: 3    FLUoxetine (PROzac) 20 MG capsule, Take 1 capsule by mouth Daily., Disp: , Rfl:     HYDROcodone-acetaminophen (NORCO) 5-325 MG per tablet, Take 1-2 tablets by mouth Every 4 (Four) Hours As Needed (Pain)., Disp: 20 tablet, Rfl: 0    hydrOXYzine (ATARAX) 25 MG tablet, Take 1 tablet by mouth Every 8 (Eight) Hours As Needed for Itching., Disp: , Rfl:     ibandronate (BONIVA) 150 MG tablet, Take 1 tablet by mouth Every 30 (Thirty) Days., Disp: , Rfl:     meloxicam (MOBIC) 15 MG tablet, Take 1 tablet by mouth Daily., Disp: , Rfl:     metoprolol tartrate (LOPRESSOR) 25 MG tablet, Take 1 tablet by mouth Daily., Disp: , Rfl:     mometasone (ELOCON) 0.1 % cream, Apply 1 Application topically to the appropriate area as directed Daily., Disp: , Rfl:     montelukast (SINGULAIR) 10 MG tablet, , Disp: , Rfl:     nystatin (MYCOSTATIN) 591801 UNIT/GM powder, Apply 1 Application topically to the appropriate area as directed 3 (Three) Times a Day., Disp: , Rfl:     pantoprazole (PROTONIX) 40 MG EC tablet, Take 1 tablet by mouth Every Night., Disp: , Rfl:     sertraline (ZOLOFT) 50 MG  "tablet, Take 1 tablet by mouth Daily., Disp: , Rfl:     Vibegron (Gemtesa) 75 MG tablet, Take 1 tablet by mouth Daily., Disp: 90 tablet, Rfl: 3    Xarelto 2.5 MG tablet, Take 1 tablet by mouth 2 (Two) Times a Day With Meals. LAST DOSE A.M. 07/30/22 AS INSTRUCTED BY DR. COLLINS, Disp: , Rfl:     Stiolto Respimat 2.5-2.5 MCG/ACT aerosol solution inhaler, INHALE 2 puffs DAILY (Patient not taking: Reported on 11/1/2024), Disp: 4 g, Rfl: 11         Vital Signs   /66 (BP Location: Left arm, Patient Position: Sitting, Cuff Size: Adult)   Pulse 65   Temp 97.7 °F (36.5 °C) (Temporal)   Resp 14   Ht 64 cm (25.2\")   Wt 127 kg (280 lb)   SpO2 94% Comment: room air  .07 kg/m²       Objective     Physical Exam  Vitals reviewed.   Constitutional:       General: She is not in acute distress.     Appearance: Normal appearance. She is obese. She is not ill-appearing.   HENT:      Head: Normocephalic and atraumatic.      Nose: Nose normal.      Mouth/Throat:      Mouth: Mucous membranes are moist.      Pharynx: Oropharynx is clear.   Eyes:      Extraocular Movements: Extraocular movements intact.      Conjunctiva/sclera: Conjunctivae normal.      Pupils: Pupils are equal, round, and reactive to light.   Cardiovascular:      Rate and Rhythm: Normal rate and regular rhythm.      Pulses: Normal pulses.      Heart sounds: Normal heart sounds.   Pulmonary:      Effort: Pulmonary effort is normal. No respiratory distress.      Breath sounds: Normal breath sounds. No stridor. No wheezing, rhonchi or rales.   Abdominal:      General: Bowel sounds are normal.   Musculoskeletal:         General: Normal range of motion.      Cervical back: Normal range of motion and neck supple.   Skin:     General: Skin is warm and dry.   Neurological:      Mental Status: She is alert and oriented to person, place, and time.   Psychiatric:         Behavior: Behavior normal.         Results Review  I have personally reviewed the prior " office notes, hospital records, labs, and diagnostics.  CT Chest Low Dose Cancer Screening WO [[id number]] (Order [id number])  Order  Status: Final result     Study Notes     Vicenta Alanis on 9/3/2024  2:50 PM EDT   FORMER SMOKER 2 PPD SINCE AGE 15 QUIT OCTOBER 2016, SHORTNESS OF BREATH     Appointment Information    PACS Images     Radiology Images  Study Result    Narrative & Impression   CT CHEST LOW DOSE CANCER SCREENING WO     Date of Exam: 9/3/2024 2:30 PM EDT     Indication: Prior smoker with 45-pack-year smoking history..     Comparison: CT Lung cancer screening dated 6/27/2023, 6/22/2022, 12/22/2021     Technique: Low dose CT imaging of the chest was performed without intravenous contrast enhancement.  Automated exposure control and iterative reconstruction methods were used.     Findings:  The visualized soft tissue structures at the base of the neck including the thyroid appear within normal limits. There is no lower cervical or axillary adenopathy.     The heart size is normal. There is no pericardial effusion. There is coronary and aortic atherosclerotic calcification. The aorta is normal in caliber. The main pulmonary artery appears normal in caliber. There is no mediastinal or hilar lymphadenopathy   by size criteria.     The tracheobronchial tree is patent. There is no abnormal bronchial wall thickening or bronchiectasis. There is mild peripheral reticulation and interstitial thickening within the anterior right upper lobe likely representing chronic scarring. This is   similar to the prior examination. There is also anterior scarring within the left upper lobe. There is chronic atelectasis within the basilar aspect of the right lower lobe. There is a calcified nodule within the right lower lobe compatible with prior   granulomatous disease. There are no suspicious noncalcified pulmonary nodules.     The esophagus is normal in course and caliber. Visualized portions of the upper abdomen demonstrate no  acute findings. There are multilevel degenerative changes of the cervical and thoracic spine. No suspicious lytic or sclerotic osseous lesion within   the thorax.     IMPRESSION:  Impression:  1. No suspicious pulmonary nodule. Recommend low-dose chest CT in 1 year.  2. Chronic scarring within the anterior aspect of the right upper lobe and left upper lobe.  3. Chronic peripheral atelectasis within the inferior right lower lobe.     Recommendation:  Continue annual screening with LDCT     Lung Rads Assessment:  Lung-RADS L1 - Negative, <1% chance of malignancy.            Electronically Signed: Maximilian Calle    9/4/2024 4:45 PM EDT    Workstation ID: [id number]     Assessment         Patient Active Problem List   Diagnosis    Allergic rhinitis    Pulmonary emphysema    Gastroesophageal reflux disease    Hypertension    History of vaccination    Hyperlipidemia    SAMI on CPAP    Smoking greater than 40 pack years    Lung nodule    Class 3 severe obesity due to excess calories without serious comorbidity with body mass index (BMI) of 40.0 to 44.9 in adult    Circadian rhythm sleep disorder, delayed sleep phase type    Anxiety    Arteriosclerosis of coronary artery    Arthritis    Carpal tunnel syndrome    Chronic pleural effusion    Depressive disorder    Heart murmur    History of tobacco abuse    Limb swelling    Myocardial infarction    Heart attack    Pruritic dermatitis    Rectal bleeding    Seasonal allergies    Personal history of transient ischemic attack (TIA), and cerebral infarction without residual deficits    Umbilical hernia without obstruction and without gangrene    Post-menopause    Nausea    Early satiety    Colon cancer screening        Plan     Diagnoses and all orders for this visit:    1. Pulmonary emphysema, unspecified emphysema type (Primary)    2. Class 3 severe obesity due to excess calories without serious comorbidity with body mass index (BMI) of 40.0 to 44.9 in adult    3. Cigarette  nicotine dependence in remission    4. Allergic rhinitis, unspecified seasonality, unspecified trigger    5. Aneurysm of ascending aorta without rupture  -     Ambulatory Referral to Cardiology             Smoking status:  reports that she quit smoking about 8 years ago. Her smoking use included cigarettes. She started smoking about 53 years ago. She has a 45 pack-year smoking history. She has never been exposed to tobacco smoke. She has never used smokeless tobacco.    Vaccination status: Reviewed  Immunization History   Administered Date(s) Administered    ABRYSVO (RSV, 60+ or pregnant women 32-36 wks) 01/04/2024    COVID-19 (PFIZER) 12YRS+ (COMIRNATY) 12/29/2023    COVID-19 (PFIZER) BIVALENT 12+YRS 10/27/2022    COVID-19 (PFIZER) Purple Cap Monovalent 03/20/2021, 04/17/2021, 02/08/2022    Flu Vaccine Quad PF >36MO 10/28/2020, 10/28/2021    Fluzone (or Fluarix & Flulaval for VFC) >6mos 10/28/2020, 10/28/2021, 10/27/2022, 10/24/2023    Fluzone High-Dose 65+YRS 09/28/2021, 10/10/2024    Influenza, Unspecified 10/28/2022    Pneumococcal Conjugate 20-Valent (PCV20) 12/08/2022    Pneumococcal Polysaccharide (PPSV23) 07/16/2020    Shingrix 07/07/2021, 11/02/2021    Tdap 07/16/2020        Medications personally reviewed    Follow Up  Return in about 6 months (around 5/1/2025) for with Dr. Garcia.    Patient was given instructions and counseling regarding her condition or for health maintenance advice. Please see specific information pulled into the AVS if appropriate.     I spent 22 minutes caring for Chiara Zee on this date of service. This time includes time spent by me in the following activities:preparing for the visit, reviewing tests, obtaining and/or reviewing a separately obtained history, performing a medically appropriate examination and/or evaluation, counseling and educating the patient/family/caregiver, ordering medications, tests, or procedures, documenting information in the medical record,  independently interpreting results and communicating that information with the patient/family/caregiver and answered questions family members, discuss medications.

## 2024-11-01 ENCOUNTER — OFFICE VISIT (OUTPATIENT)
Dept: PULMONOLOGY | Facility: CLINIC | Age: 64
End: 2024-11-01
Payer: MEDICARE

## 2024-11-01 VITALS
WEIGHT: 280 LBS | BODY MASS INDEX: 64.8 KG/M2 | HEART RATE: 65 BPM | HEIGHT: 55 IN | RESPIRATION RATE: 14 BRPM | DIASTOLIC BLOOD PRESSURE: 66 MMHG | OXYGEN SATURATION: 94 % | SYSTOLIC BLOOD PRESSURE: 128 MMHG | TEMPERATURE: 97.7 F

## 2024-11-01 DIAGNOSIS — E66.813 CLASS 3 SEVERE OBESITY DUE TO EXCESS CALORIES WITHOUT SERIOUS COMORBIDITY WITH BODY MASS INDEX (BMI) OF 40.0 TO 44.9 IN ADULT: ICD-10-CM

## 2024-11-01 DIAGNOSIS — J30.9 ALLERGIC RHINITIS, UNSPECIFIED SEASONALITY, UNSPECIFIED TRIGGER: ICD-10-CM

## 2024-11-01 DIAGNOSIS — J43.9 PULMONARY EMPHYSEMA, UNSPECIFIED EMPHYSEMA TYPE: Primary | ICD-10-CM

## 2024-11-01 DIAGNOSIS — E66.01 CLASS 3 SEVERE OBESITY DUE TO EXCESS CALORIES WITHOUT SERIOUS COMORBIDITY WITH BODY MASS INDEX (BMI) OF 40.0 TO 44.9 IN ADULT: ICD-10-CM

## 2024-11-01 DIAGNOSIS — F17.211 CIGARETTE NICOTINE DEPENDENCE IN REMISSION: ICD-10-CM

## 2024-11-01 DIAGNOSIS — I71.21 ANEURYSM OF ASCENDING AORTA WITHOUT RUPTURE: ICD-10-CM

## 2024-11-01 PROCEDURE — 3078F DIAST BP <80 MM HG: CPT | Performed by: NURSE PRACTITIONER

## 2024-11-01 PROCEDURE — 1159F MED LIST DOCD IN RCRD: CPT | Performed by: NURSE PRACTITIONER

## 2024-11-01 PROCEDURE — 3074F SYST BP LT 130 MM HG: CPT | Performed by: NURSE PRACTITIONER

## 2024-11-01 PROCEDURE — 1160F RVW MEDS BY RX/DR IN RCRD: CPT | Performed by: NURSE PRACTITIONER

## 2024-11-01 PROCEDURE — 99214 OFFICE O/P EST MOD 30 MIN: CPT | Performed by: NURSE PRACTITIONER

## 2025-04-15 ENCOUNTER — OFFICE VISIT (OUTPATIENT)
Dept: UROLOGY | Age: 65
End: 2025-04-15
Payer: MEDICARE

## 2025-04-15 VITALS — HEIGHT: 64 IN | BODY MASS INDEX: 48.83 KG/M2 | WEIGHT: 286 LBS

## 2025-04-15 DIAGNOSIS — N39.41 URGE INCONTINENCE OF URINE: ICD-10-CM

## 2025-04-15 DIAGNOSIS — N39.41 URGE INCONTINENCE: Primary | ICD-10-CM

## 2025-04-15 PROBLEM — Z12.11 COLON CANCER SCREENING: Status: RESOLVED | Noted: 2023-10-11 | Resolved: 2025-04-15

## 2025-04-15 PROBLEM — J30.2 SEASONAL ALLERGIES: Status: RESOLVED | Noted: 2022-06-16 | Resolved: 2025-04-15

## 2025-04-15 PROBLEM — F17.210 SMOKING GREATER THAN 40 PACK YEARS: Status: RESOLVED | Noted: 2021-12-15 | Resolved: 2025-04-15

## 2025-04-15 PROBLEM — K42.9 UMBILICAL HERNIA WITHOUT OBSTRUCTION AND WITHOUT GANGRENE: Status: RESOLVED | Noted: 2022-07-06 | Resolved: 2025-04-15

## 2025-04-15 PROBLEM — I21.9 MYOCARDIAL INFARCTION: Status: RESOLVED | Noted: 2017-01-01 | Resolved: 2025-04-15

## 2025-04-15 PROBLEM — Z87.891 HISTORY OF TOBACCO ABUSE: Status: RESOLVED | Noted: 2022-06-16 | Resolved: 2025-04-15

## 2025-04-15 PROBLEM — R11.0 NAUSEA: Status: RESOLVED | Noted: 2023-07-14 | Resolved: 2025-04-15

## 2025-04-15 PROBLEM — I21.9 HEART ATTACK: Status: RESOLVED | Noted: 2022-06-16 | Resolved: 2025-04-15

## 2025-04-15 PROBLEM — G47.21 CIRCADIAN RHYTHM SLEEP DISORDER, DELAYED SLEEP PHASE TYPE: Status: RESOLVED | Noted: 2022-05-23 | Resolved: 2025-04-15

## 2025-04-15 PROBLEM — Z78.0 POST-MENOPAUSE: Status: RESOLVED | Noted: 2023-04-27 | Resolved: 2025-04-15

## 2025-04-15 PROBLEM — K62.5 RECTAL BLEEDING: Status: RESOLVED | Noted: 2022-06-16 | Resolved: 2025-04-15

## 2025-04-15 PROBLEM — R68.81 EARLY SATIETY: Status: RESOLVED | Noted: 2023-07-14 | Resolved: 2025-04-15

## 2025-04-15 PROBLEM — G56.00 CARPAL TUNNEL SYNDROME: Status: RESOLVED | Noted: 2022-06-16 | Resolved: 2025-04-15

## 2025-04-15 PROBLEM — Z92.29 HISTORY OF VACCINATION: Status: RESOLVED | Noted: 2021-12-15 | Resolved: 2025-04-15

## 2025-04-15 PROBLEM — Z86.73 PERSONAL HISTORY OF TRANSIENT ISCHEMIC ATTACK (TIA), AND CEREBRAL INFARCTION WITHOUT RESIDUAL DEFICITS: Status: RESOLVED | Noted: 2017-01-01 | Resolved: 2025-04-15

## 2025-04-15 PROBLEM — M79.89 LIMB SWELLING: Status: RESOLVED | Noted: 2022-06-16 | Resolved: 2025-04-15

## 2025-04-15 LAB
BILIRUB BLD-MCNC: NEGATIVE MG/DL
CLARITY, POC: CLEAR
COLOR UR: YELLOW
EXPIRATION DATE: ABNORMAL
GLUCOSE UR STRIP-MCNC: NEGATIVE MG/DL
KETONES UR QL: NEGATIVE
LEUKOCYTE EST, POC: ABNORMAL
Lab: ABNORMAL
NITRITE UR-MCNC: POSITIVE MG/ML
PH UR: 6 [PH] (ref 5–8)
PROT UR STRIP-MCNC: NEGATIVE MG/DL
RBC # UR STRIP: ABNORMAL /UL
SP GR UR: 1.02 (ref 1–1.03)
URINE VOLUME: 17
UROBILINOGEN UR QL: ABNORMAL

## 2025-04-15 PROCEDURE — 87077 CULTURE AEROBIC IDENTIFY: CPT | Performed by: NURSE PRACTITIONER

## 2025-04-15 PROCEDURE — 87086 URINE CULTURE/COLONY COUNT: CPT | Performed by: NURSE PRACTITIONER

## 2025-04-15 PROCEDURE — 87186 SC STD MICRODIL/AGAR DIL: CPT | Performed by: NURSE PRACTITIONER

## 2025-04-15 RX ORDER — VIBEGRON 75 MG/1
75 TABLET, FILM COATED ORAL DAILY
Qty: 90 TABLET | Refills: 3 | Status: SHIPPED | OUTPATIENT
Start: 2025-04-15

## 2025-04-15 NOTE — PROGRESS NOTES
Chief Complaint: Urinary Incontinence (Patient is here today for her annual she states that she is still dripping and sometimes when she states up she has an accident )    Subjective         History of Present Illness  Chiara Zee is a 64 y.o. female presents to [facility] to be seen for f/u Urinary incontinence.    She has continued on gemtesa.     Nocturia x 1-2     She will have some feeling of incomplete emptying at times and has to go back within a few minutes to finish but not every day.    She denies significant urgency or frequency.     Only will have leakage if she delays the urge.       Previous:   She has remained on gemtesa.    Nocturia x 0    She denies significant urgency or frequency.     Only will have leakage if she delays the urge.     No longer wearing pads.      Previous:  She was placed on myrbetriq 50 mg q day at last visit.     She states she has seen no change in symptoms.    She states she has been with itching to her head as well.    She continues to have bothersome leakage and urgency with frequency.    She has been on oxybutynin 5 mg daily for the last 8 months to see if this would help to alleviate her urgency and frequency as well as urge urinary incontinence.    She states her symptoms worsened in the last month.     She states she is still with urge incontinence.    She has had more falls recently.    She feels as if she has had some cognitive decline and skin itching.      The patient has continued to decrease caffeine intake.    At last visit we discussed that her Caffeine intake may be contributing to her symptoms.    She has cut out caffeine and is only drinking pink lemonade.    She states that her leakage has improved but not gone entirely.      The patient states that she will not have the urge to go and will stand up then will have full bladder leakage and at times will get the urge to go but cannot make it.    She drinks coffee 20 oz and  pepsi zero x2 daily even during the night.     She has nocturia x 1 rarely 2x     She voids every hour during the day.    She does have leakage with cough laugh and sneeze, but not as bothersome.    She has not had  Hysterectomy.    Objective     Past Medical History:   Diagnosis Date    Alpha-1-antitrypsin deficiency     Arthritis     COPD (chronic obstructive pulmonary disease)     INHALERS PRN    Coronary artery disease     FOLLOWS W/ELSHIEKH, NO C/O CP OR  SOA    Elevated cholesterol     History of thoracentesis     Hyperlipidemia     Hypertension     Myocardial infarction     PT STATES UNKNOWN DATE    Sleep apnea     Umbilical hernia without obstruction and without gangrene        Past Surgical History:   Procedure Laterality Date    BREAST BIOPSY Left      SECTION      COLONOSCOPY      ENDOSCOPY      ENDOSCOPY N/A 2023    Procedure: ESOPHAGOGASTRODUODENOSCOPY WITH BIOPSIES;  Surgeon: Gabriel Rankin MD;  Location: [facility] ENDOSCOPY;  Service: Gastroenterology;  Laterality: N/A;  HIATAL HERNIA, REFLUX ESOPHAGITIS    OTHER SURGICAL HISTORY      right wrist fracture, plate and screws    UPPER GASTROINTESTINAL ENDOSCOPY      VENTRAL HERNIA REPAIR N/A 2022    Procedure: Robotic Umbilical Hernia Repair with Mesh Placement;  Surgeon: Epifanio Morales MD;  Location: [facility] MAIN OR;  Service: Robotics - DaVinci;  Laterality: N/A;         Current Outpatient Medications:     albuterol sulfate  (90 Base) MCG/ACT inhaler, Inhale 2 puffs Every 4 (Four) Hours As Needed for Wheezing., Disp: 1 g, Rfl: 11    atorvastatin (LIPITOR) 20 MG tablet, Take 1 tablet by mouth Every Night., Disp: , Rfl:     budesonide (Pulmicort) 0.5 MG/2ML nebulizer solution, Take 2 mL by nebulization Daily., Disp: 60 each, Rfl: 11    busPIRone (BUSPAR) 10 MG tablet, Take 1 tablet by mouth 3 (Three) Times a Day. TAKES FIRST DOSE LATER IN THE DAY, Disp: , Rfl:     cetirizine (zyrTEC) 10 MG tablet, TAKE ONE TABLET BY MOUTH  EVERY DAY, Disp: 90 tablet, Rfl: 3    FLUoxetine (PROzac) 20 MG capsule, Take 1 capsule by mouth Daily., Disp: , Rfl:     hydrOXYzine (ATARAX) 25 MG tablet, Take 1 tablet by mouth Every 8 (Eight) Hours As Needed for Itching., Disp: , Rfl:     ibandronate (BONIVA) 150 MG tablet, Take 1 tablet by mouth Every 30 (Thirty) Days., Disp: , Rfl:     meloxicam (MOBIC) 15 MG tablet, Take 1 tablet by mouth Daily., Disp: , Rfl:     metoprolol tartrate (LOPRESSOR) 25 MG tablet, Take 1 tablet by mouth Daily., Disp: , Rfl:     mometasone (ELOCON) 0.1 % cream, Apply 1 Application topically to the appropriate area as directed Daily., Disp: , Rfl:     montelukast (SINGULAIR) 10 MG tablet, , Disp: , Rfl:     nystatin (MYCOSTATIN) 322414 UNIT/GM powder, Apply 1 Application topically to the appropriate area as directed 3 (Three) Times a Day., Disp: , Rfl:     pantoprazole (PROTONIX) 40 MG EC tablet, Take 1 tablet by mouth Every Night., Disp: , Rfl:     sertraline (ZOLOFT) 50 MG tablet, Take 1 tablet by mouth Daily., Disp: , Rfl:     Stiolto Respimat 2.5-2.5 MCG/ACT aerosol solution inhaler, INHALE 2 puffs DAILY, Disp: 4 g, Rfl: 11    Vibegron (Gemtesa) 75 MG tablet, Take 1 tablet by mouth Daily., Disp: 90 tablet, Rfl: 3    Xarelto 2.5 MG tablet, Take 1 tablet by mouth 2 (Two) Times a Day With Meals. LAST DOSE A.M. 22 AS INSTRUCTED BY DR. COLLINS, Disp: , Rfl:     No Known Allergies     Family History   Problem Relation Age of Onset    Prostate cancer Father     Diabetes Sister     Diabetes Brother     [place] Hyperthermia Neg Hx     Colon cancer Neg Hx        Social History     Socioeconomic History    Marital status:    Tobacco Use    Smoking status: Former     Current packs/day: 0.00     Average packs/day: 1 pack/day for 45.0 years (45.0 ttl pk-yrs)     Types: Cigarettes     Start date:      Quit date: 2016     Years since quittin.2     Passive exposure: Never    Smokeless tobacco: Never   Vaping Use    Vaping  "status: Never Used   Substance and Sexual Activity    Alcohol use: Never    Drug use: Never    Sexual activity: Defer       Vital Signs:   Ht 162.6 cm (64\")   Wt 130 kg (286 lb)   BMI 49.09 kg/m²      Physical Exam     Result Review :   The following data was reviewed by: DANI Rivas on 04/15/2025:  Results for orders placed or performed in visit on 04/15/25   Bladder Scan    Collection Time: 04/15/25  1:11 PM   Result Value Ref Range    Urine Volume 17    POC Urinalysis Dipstick, Automated    Collection Time: 04/15/25  1:29 PM    Specimen: Urine   Result Value Ref Range    Color Yellow Yellow, Straw, Dark Yellow, Carla    Clarity, UA Clear Clear    Specific Gravity  1.020 1.005 - 1.030    pH, Urine 6.0 5.0 - 8.0    Leukocytes Trace (A) Negative    Nitrite, UA Positive (A) Negative    Protein, POC Negative Negative mg/dL    Glucose, UA Negative Negative mg/dL    Ketones, UA Negative Negative    Urobilinogen, UA 0.2 E.U./dL Normal, 0.2 E.U./dL    Bilirubin Negative Negative    Blood, UA Trace (A) Negative    Lot Number [id number]     Expiration Date 3/2,026           Bladder Scan interpretation 04/15/2025    Estimation of residual urine via [employer] 3000 Verathon Bladder Scan  MA/nurse performing:  soina david Ma   Residual Urine: 17 ml  Indication: Urge incontinence   Position: Supine  Examination: Incremental scanning of the suprapubic area using 2.0 MHz transducer using copious amounts of acoustic gel.   Findings: An anechoic area was demonstrated which represented the bladder, with measurement of residual urine as noted. I inspected this myself. In that the residual urine was stable or insignificant, refer to plan for treatment and plan necessary at this time.              Procedures        Assessment and Plan    Diagnoses and all orders for this visit:    1. Urge incontinence (Primary)  -     POC Urinalysis Dipstick, Automated  -     Bladder Scan        Patient is doing well on Gemtesa we will continue her " on this and follow-up with her in 1 year or sooner if needed.    We will order a cx today for possible UTI that may be contributing to symptoms.    She will call with any new or worsening symptoms.    She will call with s/s pf UTI for outpatient cx       I spent  10 minutes caring for Chiara on this date of service. This time includes time spent by me in the following activities:reviewing tests, obtaining and/or reviewing a separately obtained history, performing a medically appropriate examination and/or evaluation , counseling and educating the patient/family/caregiver, ordering medications, tests, or procedures, and documenting information in the medical record  Follow Up   Return in about 1 year (around 4/15/2026) for annual f/u with pvr .  Patient was given instructions and counseling regarding her condition or for health maintenance advice. Please see specific information pulled into the AVS if appropriate.         This document has been electronically signed by DANI Rivas  April 15, 2025 13:38 EDT

## 2025-04-17 LAB — BACTERIA SPEC AEROBE CULT: ABNORMAL

## 2025-05-01 ENCOUNTER — OFFICE VISIT (OUTPATIENT)
Dept: PULMONOLOGY | Facility: CLINIC | Age: 65
End: 2025-05-01
Payer: MEDICARE

## 2025-05-01 VITALS
WEIGHT: 283.4 LBS | OXYGEN SATURATION: 97 % | HEART RATE: 62 BPM | HEIGHT: 64 IN | DIASTOLIC BLOOD PRESSURE: 80 MMHG | BODY MASS INDEX: 48.38 KG/M2 | RESPIRATION RATE: 16 BRPM | TEMPERATURE: 97.4 F | SYSTOLIC BLOOD PRESSURE: 159 MMHG

## 2025-05-01 DIAGNOSIS — Z87.891 PERSONAL HISTORY OF NICOTINE DEPENDENCE: ICD-10-CM

## 2025-05-01 DIAGNOSIS — R91.1 LUNG NODULE: ICD-10-CM

## 2025-05-01 DIAGNOSIS — J90 CHRONIC PLEURAL EFFUSION: ICD-10-CM

## 2025-05-01 DIAGNOSIS — K21.00 GASTROESOPHAGEAL REFLUX DISEASE WITH ESOPHAGITIS WITHOUT HEMORRHAGE: ICD-10-CM

## 2025-05-01 DIAGNOSIS — E66.01 CLASS 3 SEVERE OBESITY DUE TO EXCESS CALORIES WITHOUT SERIOUS COMORBIDITY WITH BODY MASS INDEX (BMI) OF 40.0 TO 44.9 IN ADULT: ICD-10-CM

## 2025-05-01 DIAGNOSIS — E66.813 CLASS 3 SEVERE OBESITY DUE TO EXCESS CALORIES WITHOUT SERIOUS COMORBIDITY WITH BODY MASS INDEX (BMI) OF 40.0 TO 44.9 IN ADULT: ICD-10-CM

## 2025-05-01 DIAGNOSIS — J30.9 ALLERGIC RHINITIS, UNSPECIFIED SEASONALITY, UNSPECIFIED TRIGGER: Primary | ICD-10-CM

## 2025-05-01 DIAGNOSIS — G47.33 OSA ON CPAP: ICD-10-CM

## 2025-05-01 DIAGNOSIS — J43.9 PULMONARY EMPHYSEMA, UNSPECIFIED EMPHYSEMA TYPE: ICD-10-CM

## 2025-05-01 PROCEDURE — 99214 OFFICE O/P EST MOD 30 MIN: CPT | Performed by: INTERNAL MEDICINE

## 2025-05-01 PROCEDURE — 1160F RVW MEDS BY RX/DR IN RCRD: CPT | Performed by: INTERNAL MEDICINE

## 2025-05-01 PROCEDURE — 3077F SYST BP >= 140 MM HG: CPT | Performed by: INTERNAL MEDICINE

## 2025-05-01 PROCEDURE — 1159F MED LIST DOCD IN RCRD: CPT | Performed by: INTERNAL MEDICINE

## 2025-05-01 PROCEDURE — 3079F DIAST BP 80-89 MM HG: CPT | Performed by: INTERNAL MEDICINE

## 2025-05-01 RX ORDER — ALENDRONATE SODIUM 70 MG/1
1 TABLET ORAL WEEKLY
COMMUNITY
Start: 2025-04-10

## 2025-05-01 NOTE — PROGRESS NOTES
Primary Care Provider  Jaleesa Piper APRN     Referring Provider  No ref. provider found     Chief Complaint  Follow-up (6 month), Sleep Apnea, Emphysema, and Shortness of Breath    Subjective          Chiara Zee presents to [facility] PULMONARY [facility] MED[facility]INE  History of Present Illness  Chiara Zee is a 64 y.o. female patient   History of Present Illness  The patient is a 64-year-old female with a history of recurrent exudative pleural effusion, a right lower lobe pulmonary nodule 0.2 cm in size, former tobacco abuse of cigarettes in remission, obesity, and obstructive sleep apnea. She is here for a follow-up visit.    She reports overall good health, utilizing the Stiolto inhaler and Pulmicort nebulizer intermittently, particularly during periods of high pollen. She has not required the use of a rescue inhaler. She has not required antibiotics or steroids for her lungs during the winter season. She has not experienced any episodes of oral thrush and does not use nystatin swish and swallow. She does not require any medication refills at this time. She is on albuterol as needed, Pulmicort nebulizer twice daily, Singulair once daily, and Stiolto Respimat.    She has been receiving vitamin B12 injections but reports a lack of motivation to engage in activities.    She consistently uses her CPAP machine nightly without any issues and finds it essential for her sleep. [facility] usage.    She occasionally experiences flare-ups of reflux, which are less frequent than previously reported. She is currently on Protonix once daily, which appears to be effective.    She does not report any leg swelling. She was previously unaware of having a heart murmur.    Supplemental Information  She was prescribed cefdinir for a urinary tract infection.    SOCIAL HISTORY  The patient has a history of former tobacco abuse of cigarettes, now in  remission.    MEDICATIONS  Current: Albuterol, Pulmicort nebulizer, Singulair, Stiolto Respimat, Protonix.  Past: Cefdinir.    Review of Systems     General:  No Fatigue, No Fever No weight loss or loss of appetite  HEENT: No dysphagia, No Visual Changes, no rhinorrhea  Respiratory:  + cough,+Dyspnea, intermittent phlegm, No Pleuritic Pain, no wheezing, no hemoptysis.  Cardiovascular: Denies chest pain, denies palpitations,+JHAVERI, No Chest Pressure  Gastrointestinal:  No Abdominal Pain, No Nausea, No Vomiting, No Diarrhea  Genitourinary:  No Dysuria, No Frequency, No Hesitancy  Musculoskeletal: No muscle pain or swelling  Endocrine:  No Heat Intolerance, No Cold Intolerance  Hematologic:  No Bleeding, No Bruising  Psychiatric:  No Anxiety, No Depression  Neurologic:  No Confusion, no Dysarthria, No Headaches  Skin:  No Rash, No Open Wounds    Family History   Problem Relation Age of Onset    Prostate cancer Father     Diabetes Sister     Diabetes Brother     Malig Hyperthermia Neg Hx     Colon cancer Neg Hx         Social History     Socioeconomic History    Marital status:    Tobacco Use    Smoking status: Former     Current packs/day: 0.00     Average packs/day: 1 pack/day for 45.0 years (45.0 ttl pk-yrs)     Types: Cigarettes     Start date:      Quit date: 2016     Years since quittin.3     Passive exposure: Never    Smokeless tobacco: Never   Vaping Use    Vaping status: Never Used   Substance and Sexual Activity    Alcohol use: Never    Drug use: Never    Sexual activity: Defer        Past Medical History:   Diagnosis Date    Alpha-1-antitrypsin deficiency     Arthritis     COPD (chronic obstructive pulmonary disease)     INHALERS PRN    Coronary artery disease     FOLLOWS W/ELSHIEKH, NO C/O CP OR  SOA    Elevated cholesterol     History of thoracentesis     Hyperlipidemia     Hypertension     Myocardial infarction     PT STATES UNKNOWN DATE    Sleep apnea     Umbilical hernia without obstruction  and without gangrene         Immunization History   Administered Date(s) Administered    ABRYSVO (RSV, 60+ or pregnant women 32-36 wks) 01/04/2024    COVID-19 (PFIZER) 12YRS+ (COMIRNATY) 12/29/2023, 04/10/2025    COVID-19 (PFIZER) BIVALENT 12+YRS 10/27/2022    COVID-19 (PFIZER) Purple Cap Monovalent 03/20/2021, 04/17/2021, 02/08/2022    Flu Vaccine Quad PF >36MO 10/28/2020, 10/28/2021    Fluzone (or Fluarix & Flulaval for VFC) >6mos 10/28/2020, 10/28/2021, 10/27/2022, 10/24/2023    Fluzone High-Dose 65+YRS 09/28/2021, 10/10/2024    Influenza, Unspecified 10/28/2022    Pneumococcal Conjugate 20-Valent (PCV20) 12/08/2022    Pneumococcal Polysaccharide (PPSV23) 07/16/2020    Shingrix 07/07/2021, 11/02/2021    Tdap 07/16/2020         No Known Allergies       Current Outpatient Medications:     albuterol sulfate  (90 Base) MCG/ACT inhaler, Inhale 2 puffs Every 4 (Four) Hours As Needed for Wheezing., Disp: 1 g, Rfl: 11    alendronate (FOSAMAX) 70 MG tablet, Take 1 tablet by mouth 1 (One) Time Per Week., Disp: , Rfl:     atorvastatin (LIPITOR) 20 MG tablet, Take 1 tablet by mouth Every Night., Disp: , Rfl:     budesonide (Pulmicort) 0.5 MG/2ML nebulizer solution, Take 2 mL by nebulization Daily., Disp: 60 each, Rfl: 11    busPIRone (BUSPAR) 10 MG tablet, Take 1 tablet by mouth 3 (Three) Times a Day. TAKES FIRST DOSE LATER IN THE DAY, Disp: , Rfl:     cefdinir (OMNICEF) 300 MG capsule, Take 1 capsule by mouth 2 (Two) Times a Day., Disp: 20 capsule, Rfl: 0    cetirizine (zyrTEC) 10 MG tablet, TAKE ONE TABLET BY MOUTH EVERY DAY, Disp: 90 tablet, Rfl: 3    FLUoxetine (PROzac) 20 MG capsule, Take 1 capsule by mouth Daily., Disp: , Rfl:     hydrOXYzine (ATARAX) 25 MG tablet, Take 1 tablet by mouth Every 8 (Eight) Hours As Needed for Itching., Disp: , Rfl:     ibandronate (BONIVA) 150 MG tablet, Take 1 tablet by mouth Every 30 (Thirty) Days., Disp: , Rfl:     meloxicam (MOBIC) 15 MG tablet, Take 1 tablet by mouth Daily.,  "Disp: , Rfl:     metoprolol tartrate (LOPRESSOR) 25 MG tablet, Take 1 tablet by mouth Daily., Disp: , Rfl:     mometasone (ELOCON) 0.1 % cream, Apply 1 Application topically to the appropriate area as directed Daily., Disp: , Rfl:     montelukast (SINGULAIR) 10 MG tablet, , Disp: , Rfl:     nystatin (MYCOSTATIN) 718149 UNIT/GM powder, Apply 1 Application topically to the appropriate area as directed 3 (Three) Times a Day., Disp: , Rfl:     pantoprazole (PROTONIX) 40 MG EC tablet, Take 1 tablet by mouth Every Night., Disp: , Rfl:     Stiolto Respimat 2.5-2.5 MCG/ACT aerosol solution inhaler, INHALE 2 puffs DAILY, Disp: 4 g, Rfl: 11    Vibegron (Gemtesa) 75 MG tablet, Take 1 tablet by mouth Daily., Disp: 90 tablet, Rfl: 3    Xarelto 2.5 MG tablet, Take 1 tablet by mouth 2 (Two) Times a Day With Meals. LAST DOSE A.M. 07/30/22 AS INSTRUCTED BY DR. COLLINS, Disp: , Rfl:     sertraline (ZOLOFT) 50 MG tablet, Take 1 tablet by mouth Daily. (Patient not taking: Reported on 5/1/2025), Disp: , Rfl:      Objective   Physical Exam  Physical Exam  Throat was examined.  A murmur is present in the heart.    Vital Signs:   /80 (BP Location: Left arm, Patient Position: Sitting, Cuff Size: Adult)   Pulse 62   Temp 97.4 °F (36.3 °C) (Tympanic)   Resp 16   Ht 162.6 cm (64\")   Wt 129 kg (283 lb 6.4 oz)   SpO2 97% Comment: room air  BMI 48.65 kg/m²       Vital Signs Reviewed  Morbidly obese, pleasant female, in no acute distress, has mild conversational dyspnea  HEENT:  PERRL, EOMI.  OP, nares clear, no sinus tenderness  Mallampatti classification : 1  Neck:  Supple, no JVD, no thyromegaly  Lymph: no axillary, cervical, supraclavicular lymphadenopathy noted bilaterally  Chest:  good aeration, bilateral diminished breath sounds, no wheezing, crackles or rhonchi, resonant to percussion b/l  CV: RRR, no MGR, pulses 2+, equal.  Abd:  Soft, NT, ND, + BS, no HSM  EXT:  no clubbing, no cyanosis, No BLE edema  Neuro:  A&Ox3, CN " grossly intact, no focal deficits.  Skin: No rashes or lesions noted     Result Review :   The following data was reviewed by: Hosea Garcia MD on 05/01/2025:        Data reviewed : Radiologic studies Previous imaging reviewed.     Results  Imaging  CT scan of the chest with contrast in November 2024 showed ascending aortic aneurysm. CT chest on October 17, 2024 showed no pleural effusion.    Testing  Echocardiogram showed mild tricuspid regurgitation.        Narrative & Impression   CT CHEST LOW DOSE CANCER SCREENING WO     Date of Exam: 9/3/2024 2:30 PM EDT     Indication: Prior smoker with 45-pack-year smoking history..     Comparison: CT Lung cancer screening dated 6/27/2023, 6/22/2022, 12/22/2021     Technique: Low dose CT imaging of the chest was performed without intravenous contrast enhancement.  Automated exposure control and iterative reconstruction methods were used.     Findings:  The visualized soft tissue structures at the base of the neck including the thyroid appear within normal limits. There is no lower cervical or axillary adenopathy.     The heart size is normal. There is no pericardial effusion. There is coronary and aortic atherosclerotic calcification. The aorta is normal in caliber. The main pulmonary artery appears normal in caliber. There is no mediastinal or hilar lymphadenopathy   by size criteria.     The tracheobronchial tree is patent. There is no abnormal bronchial wall thickening or bronchiectasis. There is mild peripheral reticulation and interstitial thickening within the anterior right upper lobe likely representing chronic scarring. This is   similar to the prior examination. There is also anterior scarring within the left upper lobe. There is chronic atelectasis within the basilar aspect of the right lower lobe. There is a calcified nodule within the right lower lobe compatible with prior   granulomatous disease. There are no suspicious noncalcified pulmonary nodules.     The  esophagus is normal in course and caliber. Visualized portions of the upper abdomen demonstrate no acute findings. There are multilevel degenerative changes of the cervical and thoracic spine. No suspicious lytic or sclerotic osseous lesion within   the thorax.     IMPRESSION:  Impression:  1. No suspicious pulmonary nodule. Recommend low-dose chest CT in 1 year.  2. Chronic scarring within the anterior aspect of the right upper lobe and left upper lobe.  3. Chronic peripheral atelectasis within the inferior right lower lobe.     Recommendation:  Continue annual screening with LDCT     Lung Rads Assessment:  Lung-RADS L1 - Negative, <1% chance of malignancy.            Electronically Signed: Maximilian Luc    9/4/2024 4:45 PM EDT    Workstation ID: [id number]        Assessment and Plan    Diagnoses and all orders for this visit:    1. Allergic rhinitis, unspecified seasonality, unspecified trigger (Primary)  -      CT Chest Low Dose Cancer Screening WO; Future  -     Adult Transthoracic Echo Complete W/ Cont if Necessary Per Protocol; Future    2. Class 3 severe obesity due to excess calories without serious comorbidity with body mass index (BMI) of 40.0 to 44.9 in adult  -      CT Chest Low Dose Cancer Screening WO; Future  -     Adult Transthoracic Echo Complete W/ Cont if Necessary Per Protocol; Future    3. Gastroesophageal reflux disease with esophagitis without hemorrhage  -      CT Chest Low Dose Cancer Screening WO; Future  -     Adult Transthoracic Echo Complete W/ Cont if Necessary Per Protocol; Future    4. Chronic pleural effusion  -      CT Chest Low Dose Cancer Screening WO; Future  -     Adult Transthoracic Echo Complete W/ Cont if Necessary Per Protocol; Future    5. Lung nodule  -      CT Chest Low Dose Cancer Screening WO; Future  -     Adult Transthoracic Echo Complete W/ Cont if Necessary Per Protocol; Future    6. Pulmonary emphysema, unspecified emphysema type  -      CT Chest Low Dose Cancer  Screening WO; Future  -     Adult Transthoracic Echo Complete W/ Cont if Necessary Per Protocol; Future    7. SAMI on CPAP  -      CT Chest Low Dose Cancer Screening WO; Future  -     Adult Transthoracic Echo Complete W/ Cont if Necessary Per Protocol; Future    8. Personal history of nicotine dependence  -      CT Chest Low Dose Cancer Screening WO; Future      Assessment & Plan  1. Recurrent exudative pleural effusion.  Her condition remains stable with no new symptoms reported. She is currently using Stiolto Respimat and Pulmicort nebulizer as needed, especially during high pollen seasons. A low-dose CT scan of the chest will be ordered for October 2025 to monitor her condition.    2. Right lower lobe pulmonary nodule.  The nodule remains stable as per the last CT scan. Continued monitoring with a low-dose CT scan of the chest in October 2025 is planned.    3. Obstructive sleep apnea.  She is using her CPAP machine every night without any issues and reports that she can not sleep without it. [facility] is [facility].    4. Gastroesophageal reflux disease.  She reports occasional flare-ups of reflux but notes that it is not as frequent as before. She is currently taking Protonix once a day, which is helping manage her symptoms.    5. Mild tricuspid regurgitation.  A heart murmur was noted during the physical examination. The last echocardiogram was more than 5 years ago. A repeat echocardiogram will be ordered to reassess her condition.    Follow-up  The patient will follow up in 6 months.    Follow Up   Return in about 6 months (around 11/1/2025).  Patient was given instructions and counseling regarding her condition or for health maintenance advice. Please see specific information pulled into the AVS if appropriate.     Patient or patient representative verbalized consent for the use of Ambient Listening during the visit with  Hosea Garcia MD for chart documentation. 5/1/2025  10:14  EDT    Electronically signed by Hosea Garcia MD, 5/1/2025, 10:14 EDT.

## 2025-08-14 ENCOUNTER — OFFICE VISIT (OUTPATIENT)
Dept: GASTROENTEROLOGY | Facility: CLINIC | Age: 65
End: 2025-08-14
Payer: MEDICARE

## 2025-08-14 ENCOUNTER — LAB (OUTPATIENT)
Dept: LAB | Facility: HOSPITAL | Age: 65
End: 2025-08-14
Payer: MEDICARE

## 2025-08-14 VITALS
HEIGHT: 64 IN | WEIGHT: 287 LBS | BODY MASS INDEX: 49 KG/M2 | SYSTOLIC BLOOD PRESSURE: 122 MMHG | OXYGEN SATURATION: 100 % | DIASTOLIC BLOOD PRESSURE: 72 MMHG | HEART RATE: 70 BPM

## 2025-08-14 DIAGNOSIS — K74.60 CIRRHOSIS OF LIVER WITHOUT ASCITES, UNSPECIFIED HEPATIC CIRRHOSIS TYPE: Primary | ICD-10-CM

## 2025-08-14 DIAGNOSIS — R10.11 RUQ PAIN: ICD-10-CM

## 2025-08-14 DIAGNOSIS — K21.9 GASTROESOPHAGEAL REFLUX DISEASE WITHOUT ESOPHAGITIS: ICD-10-CM

## 2025-08-14 DIAGNOSIS — R11.0 NAUSEA: ICD-10-CM

## 2025-08-14 DIAGNOSIS — K74.60 CIRRHOSIS OF LIVER WITHOUT ASCITES, UNSPECIFIED HEPATIC CIRRHOSIS TYPE: ICD-10-CM

## 2025-08-14 LAB
ALBUMIN SERPL-MCNC: 4.4 G/DL (ref 3.5–5.2)
ALBUMIN/GLOB SERPL: 1.8 G/DL
ALP SERPL-CCNC: 70 U/L (ref 39–117)
ALPHA-FETOPROTEIN: 4.61 NG/ML (ref 0–8.3)
ALT SERPL W P-5'-P-CCNC: 26 U/L (ref 1–33)
ANION GAP SERPL CALCULATED.3IONS-SCNC: 14 MMOL/L (ref 5–15)
AST SERPL-CCNC: 29 U/L (ref 1–32)
BASOPHILS # BLD AUTO: 0.05 10*3/MM3 (ref 0–0.2)
BASOPHILS NFR BLD AUTO: 0.9 % (ref 0–1.5)
BILIRUB SERPL-MCNC: 0.8 MG/DL (ref 0–1.2)
BUN SERPL-MCNC: 10 MG/DL (ref 8–23)
BUN/CREAT SERPL: 12.3 (ref 7–25)
CALCIUM SPEC-SCNC: 9.5 MG/DL (ref 8.6–10.5)
CHLORIDE SERPL-SCNC: 100 MMOL/L (ref 98–107)
CO2 SERPL-SCNC: 22 MMOL/L (ref 22–29)
CREAT SERPL-MCNC: 0.81 MG/DL (ref 0.57–1)
DEPRECATED RDW RBC AUTO: 45.9 FL (ref 37–54)
EGFRCR SERPLBLD CKD-EPI 2021: 80.2 ML/MIN/1.73
EOSINOPHIL # BLD AUTO: 0.14 10*3/MM3 (ref 0–0.4)
EOSINOPHIL NFR BLD AUTO: 2.6 % (ref 0.3–6.2)
ERYTHROCYTE [DISTWIDTH] IN BLOOD BY AUTOMATED COUNT: 13 % (ref 12.3–15.4)
GLOBULIN UR ELPH-MCNC: 2.5 GM/DL
GLUCOSE SERPL-MCNC: 94 MG/DL (ref 65–99)
HCT VFR BLD AUTO: 39 % (ref 34–46.6)
HGB BLD-MCNC: 13.2 G/DL (ref 12–15.9)
IMM GRANULOCYTES # BLD AUTO: 0.03 10*3/MM3 (ref 0–0.05)
IMM GRANULOCYTES NFR BLD AUTO: 0.6 % (ref 0–0.5)
INR PPP: 1.11 (ref 0.86–1.15)
LYMPHOCYTES # BLD AUTO: 1.09 10*3/MM3 (ref 0.7–3.1)
LYMPHOCYTES NFR BLD AUTO: 20.5 % (ref 19.6–45.3)
MCH RBC QN AUTO: 32.3 PG (ref 26.6–33)
MCHC RBC AUTO-ENTMCNC: 33.8 G/DL (ref 31.5–35.7)
MCV RBC AUTO: 95.4 FL (ref 79–97)
MONOCYTES # BLD AUTO: 0.55 10*3/MM3 (ref 0.1–0.9)
MONOCYTES NFR BLD AUTO: 10.3 % (ref 5–12)
NEUTROPHILS NFR BLD AUTO: 3.47 10*3/MM3 (ref 1.7–7)
NEUTROPHILS NFR BLD AUTO: 65.1 % (ref 42.7–76)
NRBC BLD AUTO-RTO: 0 /100 WBC (ref 0–0.2)
PLATELET # BLD AUTO: 172 10*3/MM3 (ref 140–450)
PMV BLD AUTO: 11.7 FL (ref 6–12)
POTASSIUM SERPL-SCNC: 4.1 MMOL/L (ref 3.5–5.2)
PROT SERPL-MCNC: 6.9 G/DL (ref 6–8.5)
PROTHROMBIN TIME: 14.8 SECONDS (ref 11.8–14.9)
RBC # BLD AUTO: 4.09 10*6/MM3 (ref 3.77–5.28)
SODIUM SERPL-SCNC: 136 MMOL/L (ref 136–145)
WBC NRBC COR # BLD AUTO: 5.33 10*3/MM3 (ref 3.4–10.8)

## 2025-08-14 PROCEDURE — 85610 PROTHROMBIN TIME: CPT

## 2025-08-14 PROCEDURE — 80053 COMPREHEN METABOLIC PANEL: CPT

## 2025-08-14 PROCEDURE — 82105 ALPHA-FETOPROTEIN SERUM: CPT

## 2025-08-14 PROCEDURE — 36415 COLL VENOUS BLD VENIPUNCTURE: CPT

## 2025-08-14 PROCEDURE — 86015 ACTIN ANTIBODY EACH: CPT

## 2025-08-14 PROCEDURE — 85025 COMPLETE CBC W/AUTO DIFF WBC: CPT

## 2025-08-14 RX ORDER — OMEPRAZOLE 40 MG/1
40 CAPSULE, DELAYED RELEASE ORAL DAILY
Qty: 90 CAPSULE | Refills: 1 | Status: SHIPPED | OUTPATIENT
Start: 2025-08-14 | End: 2025-11-12

## 2025-08-15 ENCOUNTER — RESULTS FOLLOW-UP (OUTPATIENT)
Dept: GASTROENTEROLOGY | Facility: CLINIC | Age: 65
End: 2025-08-15
Payer: MEDICARE

## 2025-08-15 LAB — SMA IGG SER-ACNC: 26 UNITS (ref 0–19)

## 2025-08-28 ENCOUNTER — HOSPITAL ENCOUNTER (OUTPATIENT)
Dept: ULTRASOUND IMAGING | Facility: HOSPITAL | Age: 65
Discharge: HOME | End: 2025-08-28
Payer: MEDICARE

## 2025-08-28 DIAGNOSIS — K74.60 CIRRHOSIS OF LIVER WITHOUT ASCITES, UNSPECIFIED HEPATIC CIRRHOSIS TYPE: ICD-10-CM

## 2025-08-28 PROCEDURE — 76705 ECHO EXAM OF ABDOMEN: CPT

## (undated) DEVICE — DRSNG WND GZ CURAD OIL EMULSION 3X3IN STRL

## (undated) DEVICE — 30977 SEE SHARP - ENHANCED INTRAOPERATIVE LAPAROSCOPE CLEANING & DEFOGGING: Brand: 30977 SEE SHARP - ENHANCED INTRAOPERATIVE LAPAROSCOPE CLEANING & DEFOGGING

## (undated) DEVICE — STERILE POLYISOPRENE POWDER-FREE SURGICAL GLOVES WITH EMOLLIENT COATING: Brand: PROTEXIS

## (undated) DEVICE — STERILE COTTON BALLS LARGE 5/P: Brand: MEDLINE

## (undated) DEVICE — DRSNG SURESITE WNDW 4X4.5

## (undated) DEVICE — Device

## (undated) DEVICE — 3 RING SUTURE PASSER - 16 CM: Brand: 3 RING SUTURE PASSER - 16 CM

## (undated) DEVICE — SOL IRR NACL 0.9PCT BT 1000ML

## (undated) DEVICE — CONN JET HYDRA H20 AUXILIARY DISP

## (undated) DEVICE — Device: Brand: DEFENDO AIR/WATER/SUCTION AND BIOPSY VALVE

## (undated) DEVICE — ABDOMINAL BINDER, ILIO BYPASS: Brand: DEROYAL

## (undated) DEVICE — LINER SURG CANSTR SXN S/RIGD 1500CC

## (undated) DEVICE — SLV SCD KN/LEN ADJ EXPRSS BLENDED MD 1P/U

## (undated) DEVICE — SUT VIC PLS CTD BR 0 TIE 18IN VIL

## (undated) DEVICE — INTENDED FOR TISSUE SEPARATION, AND OTHER PROCEDURES THAT REQUIRE A SHARP SURGICAL BLADE TO PUNCTURE OR CUT.: Brand: BARD-PARKER ® CARBON RIB-BACK BLADES

## (undated) DEVICE — SUT PROLN 0 CT1 30IN 8424H

## (undated) DEVICE — ARM DRAPE

## (undated) DEVICE — CANNULA SEAL

## (undated) DEVICE — GLV SURG SENSICARE SLT PF LF 7.5 STRL

## (undated) DEVICE — LIGHT SLEEVE: Brand: DEVON

## (undated) DEVICE — DAVINCI-LF: Brand: MEDLINE INDUSTRIES, INC.

## (undated) DEVICE — PENCL E/S SMOKEEVAC W/TELESCP CANN

## (undated) DEVICE — SOL IRRG H2O PL/BG 1000ML STRL

## (undated) DEVICE — BLCK/BITE BLOX WO/DENTL/RIM W/STRAP 54F

## (undated) DEVICE — ANTIBACTERIAL UNDYED BRAIDED (POLYGLACTIN 910), SYNTHETIC ABSORBABLE SUTURE: Brand: COATED VICRYL

## (undated) DEVICE — APPL CHLORAPREP HI/LITE 26ML ORNG

## (undated) DEVICE — ENDOPATH PNEUMONEEDLE INSUFFLATION NEEDLES WITH LUER LOCK CONNECTORS 120MM: Brand: ENDOPATH

## (undated) DEVICE — TROCAR: Brand: KII FIOS FIRST ENTRY

## (undated) DEVICE — SINGLE-USE BIOPSY FORCEPS: Brand: RADIAL JAW 4

## (undated) DEVICE — TIP COVER ACCESSORY

## (undated) DEVICE — GLV SURG SENSICARE SLT PF LF 6.5 STRL

## (undated) DEVICE — SOLIDIFIER LIQLOC PLS 1500CC BT